# Patient Record
Sex: FEMALE | Race: WHITE | Employment: OTHER | URBAN - NONMETROPOLITAN AREA
[De-identification: names, ages, dates, MRNs, and addresses within clinical notes are randomized per-mention and may not be internally consistent; named-entity substitution may affect disease eponyms.]

---

## 2018-04-10 ENCOUNTER — TELEPHONE (OUTPATIENT)
Dept: NEUROLOGY | Age: 43
End: 2018-04-10

## 2018-04-10 ENCOUNTER — HOSPITAL ENCOUNTER (OUTPATIENT)
Dept: PAIN MANAGEMENT | Age: 43
Discharge: HOME | End: 2018-04-10
Payer: MEDICARE

## 2018-04-10 VITALS
HEART RATE: 94 BPM | WEIGHT: 158 LBS | RESPIRATION RATE: 18 BRPM | HEIGHT: 64 IN | SYSTOLIC BLOOD PRESSURE: 151 MMHG | DIASTOLIC BLOOD PRESSURE: 82 MMHG | OXYGEN SATURATION: 100 % | BODY MASS INDEX: 26.98 KG/M2 | TEMPERATURE: 97.7 F

## 2018-04-10 DIAGNOSIS — M54.6 CHRONIC MIDLINE THORACIC BACK PAIN: Primary | ICD-10-CM

## 2018-04-10 DIAGNOSIS — G89.29 CHRONIC MIDLINE THORACIC BACK PAIN: Primary | ICD-10-CM

## 2018-04-10 DIAGNOSIS — G43.019 INTRACTABLE MIGRAINE WITHOUT AURA AND WITHOUT STATUS MIGRAINOSUS: ICD-10-CM

## 2018-04-10 DIAGNOSIS — M54.40 CHRONIC MIDLINE LOW BACK PAIN WITH SCIATICA, SCIATICA LATERALITY UNSPECIFIED: ICD-10-CM

## 2018-04-10 DIAGNOSIS — G89.29 CHRONIC MIDLINE LOW BACK PAIN WITH SCIATICA, SCIATICA LATERALITY UNSPECIFIED: ICD-10-CM

## 2018-04-10 DIAGNOSIS — G93.5 CHIARI MALFORMATION TYPE I (HCC): ICD-10-CM

## 2018-04-10 PROCEDURE — 99205 OFFICE O/P NEW HI 60 MIN: CPT

## 2018-04-10 RX ORDER — IBUPROFEN 200 MG
800 TABLET ORAL EVERY 8 HOURS PRN
COMMUNITY
End: 2018-05-09 | Stop reason: CLARIF

## 2018-04-10 RX ORDER — BUTALBITAL, ACETAMINOPHEN AND CAFFEINE 50; 325; 40 MG/1; MG/1; MG/1
1 TABLET ORAL EVERY 6 HOURS PRN
Qty: 15 TABLET | Refills: 2 | Status: SHIPPED | OUTPATIENT
Start: 2018-04-10 | End: 2018-09-20 | Stop reason: SDUPTHER

## 2018-04-10 RX ORDER — CELECOXIB 100 MG/1
100 CAPSULE ORAL 2 TIMES DAILY
Qty: 60 CAPSULE | Refills: 2 | Status: SHIPPED | OUTPATIENT
Start: 2018-04-10 | End: 2018-04-11 | Stop reason: CLARIF

## 2018-04-10 RX ORDER — DEXTROAMPHETAMINE SACCHARATE, AMPHETAMINE ASPARTATE, DEXTROAMPHETAMINE SULFATE AND AMPHETAMINE SULFATE 5; 5; 5; 5 MG/1; MG/1; MG/1; MG/1
1 TABLET ORAL 2 TIMES DAILY
Refills: 0 | COMMUNITY
Start: 2018-03-11 | End: 2019-10-28

## 2018-04-10 RX ORDER — BUPROPION HYDROCHLORIDE 300 MG/1
1 TABLET ORAL DAILY
Refills: 2 | COMMUNITY
Start: 2018-03-08 | End: 2021-07-14

## 2018-04-10 RX ORDER — BREXPIPRAZOLE 0.5 MG/1
1 TABLET ORAL DAILY
Refills: 2 | COMMUNITY
Start: 2018-03-08 | End: 2018-08-23

## 2018-04-10 RX ORDER — NORETHINDRONE ACETATE AND ETHINYL ESTRADIOL AND FERROUS FUMARATE 1MG-20(21)
1 KIT ORAL DAILY
Refills: 11 | COMMUNITY
Start: 2018-01-10 | End: 2018-08-23

## 2018-04-10 RX ORDER — LAMOTRIGINE 100 MG/1
200 TABLET ORAL NIGHTLY
Refills: 2 | COMMUNITY
Start: 2018-03-08 | End: 2022-04-28

## 2018-04-10 RX ORDER — TIZANIDINE 4 MG/1
4 TABLET ORAL 2 TIMES DAILY PRN
Qty: 45 TABLET | Refills: 2 | Status: SHIPPED | OUTPATIENT
Start: 2018-04-10 | End: 2018-08-23

## 2018-04-10 ASSESSMENT — ACTIVITIES OF DAILY LIVING (ADL): EFFECT OF PAIN ON DAILY ACTIVITIES: LIMITS ACTIVITIES

## 2018-04-10 ASSESSMENT — PAIN DESCRIPTION - ONSET: ONSET: ON-GOING

## 2018-04-10 ASSESSMENT — PAIN DESCRIPTION - PAIN TYPE: TYPE: CHRONIC PAIN

## 2018-04-10 ASSESSMENT — PAIN DESCRIPTION - DESCRIPTORS: DESCRIPTORS: ACHING;CONSTANT

## 2018-04-10 ASSESSMENT — PAIN SCALES - GENERAL: PAINLEVEL_OUTOF10: 6

## 2018-04-10 ASSESSMENT — PAIN DESCRIPTION - LOCATION: LOCATION: BACK;NECK;HEAD

## 2018-04-10 ASSESSMENT — PAIN DESCRIPTION - FREQUENCY: FREQUENCY: CONTINUOUS

## 2018-04-10 ASSESSMENT — PAIN DESCRIPTION - ORIENTATION: ORIENTATION: LOWER;MID;UPPER

## 2018-04-10 ASSESSMENT — PAIN DESCRIPTION - PROGRESSION: CLINICAL_PROGRESSION: NOT CHANGED

## 2018-04-11 RX ORDER — MELOXICAM 7.5 MG/1
7.5 TABLET ORAL 2 TIMES DAILY
Qty: 60 TABLET | Refills: 2 | Status: SHIPPED | OUTPATIENT
Start: 2018-04-11 | End: 2018-05-09 | Stop reason: CLARIF

## 2018-04-16 ENCOUNTER — OFFICE VISIT (OUTPATIENT)
Dept: NEUROSURGERY | Age: 43
End: 2018-04-16
Payer: MEDICARE

## 2018-04-16 VITALS
WEIGHT: 164 LBS | SYSTOLIC BLOOD PRESSURE: 129 MMHG | BODY MASS INDEX: 28 KG/M2 | DIASTOLIC BLOOD PRESSURE: 88 MMHG | HEART RATE: 88 BPM | HEIGHT: 64 IN

## 2018-04-16 DIAGNOSIS — G43.019 INTRACTABLE MIGRAINE WITHOUT AURA AND WITHOUT STATUS MIGRAINOSUS: Primary | ICD-10-CM

## 2018-04-16 DIAGNOSIS — Z86.69 HISTORY OF CHIARI MALFORMATION: ICD-10-CM

## 2018-04-16 PROCEDURE — 99214 OFFICE O/P EST MOD 30 MIN: CPT | Performed by: NURSE PRACTITIONER

## 2018-04-16 RX ORDER — ZOLPIDEM TARTRATE 10 MG/1
1 TABLET ORAL NIGHTLY
Refills: 2 | COMMUNITY
Start: 2018-04-10

## 2018-04-16 RX ORDER — CLONAZEPAM 0.5 MG/1
1 TABLET ORAL 3 TIMES DAILY PRN
Refills: 1 | COMMUNITY
Start: 2018-04-10 | End: 2018-06-18 | Stop reason: ALTCHOICE

## 2018-05-03 ENCOUNTER — HOSPITAL ENCOUNTER (OUTPATIENT)
Dept: GENERAL RADIOLOGY | Age: 43
Discharge: HOME | End: 2018-05-03
Payer: MEDICARE

## 2018-05-03 ENCOUNTER — HOSPITAL ENCOUNTER (OUTPATIENT)
Dept: MRI IMAGING | Age: 43
Discharge: HOME | End: 2018-05-03
Payer: MEDICARE

## 2018-05-03 DIAGNOSIS — M54.40 CHRONIC MIDLINE LOW BACK PAIN WITH SCIATICA, SCIATICA LATERALITY UNSPECIFIED: ICD-10-CM

## 2018-05-03 DIAGNOSIS — G89.29 CHRONIC MIDLINE LOW BACK PAIN WITH SCIATICA, SCIATICA LATERALITY UNSPECIFIED: ICD-10-CM

## 2018-05-03 DIAGNOSIS — G89.29 CHRONIC MIDLINE THORACIC BACK PAIN: ICD-10-CM

## 2018-05-03 DIAGNOSIS — G43.019 INTRACTABLE MIGRAINE WITHOUT AURA AND WITHOUT STATUS MIGRAINOSUS: ICD-10-CM

## 2018-05-03 DIAGNOSIS — M54.6 CHRONIC MIDLINE THORACIC BACK PAIN: ICD-10-CM

## 2018-05-03 DIAGNOSIS — G93.5 CHIARI MALFORMATION TYPE I (HCC): ICD-10-CM

## 2018-05-03 PROCEDURE — 72040 X-RAY EXAM NECK SPINE 2-3 VW: CPT

## 2018-05-03 PROCEDURE — A9577 INJ MULTIHANCE: HCPCS | Performed by: NURSE PRACTITIONER

## 2018-05-03 PROCEDURE — 70553 MRI BRAIN STEM W/O & W/DYE: CPT

## 2018-05-03 PROCEDURE — 72072 X-RAY EXAM THORAC SPINE 3VWS: CPT

## 2018-05-03 PROCEDURE — 72120 X-RAY BEND ONLY L-S SPINE: CPT

## 2018-05-03 PROCEDURE — 6360000004 HC RX CONTRAST MEDICATION: Performed by: NURSE PRACTITIONER

## 2018-05-03 RX ADMIN — GADOBENATE DIMEGLUMINE 15 ML: 529 INJECTION, SOLUTION INTRAVENOUS at 08:54

## 2018-05-09 ENCOUNTER — HOSPITAL ENCOUNTER (OUTPATIENT)
Dept: PAIN MANAGEMENT | Age: 43
Discharge: HOME | End: 2018-05-09
Payer: MEDICARE

## 2018-05-09 VITALS
RESPIRATION RATE: 18 BRPM | TEMPERATURE: 98.5 F | OXYGEN SATURATION: 100 % | BODY MASS INDEX: 28 KG/M2 | WEIGHT: 164 LBS | DIASTOLIC BLOOD PRESSURE: 82 MMHG | SYSTOLIC BLOOD PRESSURE: 116 MMHG | HEART RATE: 85 BPM | HEIGHT: 64 IN

## 2018-05-09 DIAGNOSIS — M79.18 MYOFASCIAL PAIN: Chronic | ICD-10-CM

## 2018-05-09 PROCEDURE — 20553 NJX 1/MLT TRIGGER POINTS 3/>: CPT

## 2018-05-09 PROCEDURE — 80307 DRUG TEST PRSMV CHEM ANLYZR: CPT

## 2018-05-09 PROCEDURE — 6360000002 HC RX W HCPCS

## 2018-05-09 PROCEDURE — 2500000003 HC RX 250 WO HCPCS

## 2018-05-09 RX ORDER — TRIAMCINOLONE ACETONIDE 40 MG/ML
INJECTION, SUSPENSION INTRA-ARTICULAR; INTRAMUSCULAR
Status: COMPLETED | OUTPATIENT
Start: 2018-05-09 | End: 2018-05-09

## 2018-05-09 RX ORDER — DICLOFENAC SODIUM 75 MG/1
75 TABLET, DELAYED RELEASE ORAL 2 TIMES DAILY
Qty: 60 TABLET | Refills: 3 | Status: SHIPPED | OUTPATIENT
Start: 2018-05-09 | End: 2018-07-26 | Stop reason: ALTCHOICE

## 2018-05-09 RX ORDER — BUPIVACAINE HYDROCHLORIDE 5 MG/ML
INJECTION, SOLUTION EPIDURAL; INTRACAUDAL
Status: COMPLETED | OUTPATIENT
Start: 2018-05-09 | End: 2018-05-09

## 2018-05-09 RX ADMIN — TRIAMCINOLONE ACETONIDE 20 MG: 40 INJECTION, SUSPENSION INTRA-ARTICULAR; INTRAMUSCULAR at 16:26

## 2018-05-09 RX ADMIN — BUPIVACAINE HYDROCHLORIDE 19 ML: 5 INJECTION, SOLUTION EPIDURAL; INTRACAUDAL at 16:25

## 2018-05-09 ASSESSMENT — PAIN DESCRIPTION - LOCATION: LOCATION: BACK;HEAD;NECK

## 2018-05-09 ASSESSMENT — PAIN DESCRIPTION - ORIENTATION: ORIENTATION: LOWER;MID;UPPER

## 2018-05-09 ASSESSMENT — ACTIVITIES OF DAILY LIVING (ADL): EFFECT OF PAIN ON DAILY ACTIVITIES: DAILY CHORES AND ACTIVITIES

## 2018-05-09 ASSESSMENT — PAIN DESCRIPTION - ONSET: ONSET: ON-GOING

## 2018-05-09 ASSESSMENT — PAIN DESCRIPTION - DESCRIPTORS: DESCRIPTORS: ACHING;CONSTANT

## 2018-05-09 ASSESSMENT — PAIN DESCRIPTION - PROGRESSION: CLINICAL_PROGRESSION: NOT CHANGED

## 2018-05-09 ASSESSMENT — PAIN DESCRIPTION - PAIN TYPE: TYPE: CHRONIC PAIN

## 2018-05-09 ASSESSMENT — PAIN DESCRIPTION - FREQUENCY: FREQUENCY: CONTINUOUS

## 2018-05-14 LAB
AMOBARBITAL, URINE: NEGATIVE
AMPHETAMINES, UR GC/MS CONF: >4000 NG/ML
AMPHETAMINES, URINE: ABNORMAL NG/ML
AMPHETAMINES, URINE: POSITIVE
AMPHETAMINES, URINE: POSITIVE
BARBITURATES, URINE: ABNORMAL NG/ML
BARBITURATES, URINE: POSITIVE
BENZODIAZEPINES, URINE: NEGATIVE NG/ML
BUTALBITAL, GC/MS URINE: 705 NG/ML
BUTALBITAL, URINE: POSITIVE
CANNABINOIDS, URINE: NEGATIVE NG/ML
COCAINE METABOLITE, URINE: NEGATIVE NG/ML
CREATININE, URINE: 238.9 MG/DL (ref 20–300)
ETHANOL U, QUAN: NEGATIVE %
FENTANYL URINE: NEGATIVE PG/ML
MEPERIDINE, UR: NEGATIVE NG/ML
METHADONE SCREEN, URINE: NEGATIVE NG/ML
METHAMPHETAMINE, URINE: NEGATIVE
OPIATES, URINE: NEGATIVE NG/ML
OXYCODONE/OXYMORPHONE, UR: NEGATIVE NG/ML
PENTOBARBITAL, UR: NEGATIVE
PH, URINE: 5.3 (ref 4.5–8.9)
PHENCYCLIDINE, URINE: NEGATIVE NG/ML
PHENOBARBITAL, UR: NEGATIVE
PROPOXYPHENE, URINE: NEGATIVE NG/ML
SECOBARBITAL, UR: NEGATIVE

## 2018-06-18 ENCOUNTER — OFFICE VISIT (OUTPATIENT)
Dept: NEUROSURGERY | Age: 43
End: 2018-06-18
Payer: MEDICARE

## 2018-06-18 VITALS
SYSTOLIC BLOOD PRESSURE: 124 MMHG | DIASTOLIC BLOOD PRESSURE: 83 MMHG | HEIGHT: 64 IN | BODY MASS INDEX: 27.49 KG/M2 | HEART RATE: 88 BPM | WEIGHT: 161 LBS

## 2018-06-18 DIAGNOSIS — R51.9 NONINTRACTABLE HEADACHE, UNSPECIFIED CHRONICITY PATTERN, UNSPECIFIED HEADACHE TYPE: Primary | ICD-10-CM

## 2018-06-18 PROCEDURE — 99213 OFFICE O/P EST LOW 20 MIN: CPT | Performed by: NURSE PRACTITIONER

## 2018-06-18 RX ORDER — ALPRAZOLAM 0.5 MG/1
1 TABLET ORAL 2 TIMES DAILY PRN
Refills: 2 | COMMUNITY
Start: 2018-06-09 | End: 2021-03-01

## 2018-07-16 ENCOUNTER — HOSPITAL ENCOUNTER (OUTPATIENT)
Dept: PAIN MANAGEMENT | Age: 43
Discharge: HOME | End: 2018-07-16
Payer: MEDICARE

## 2018-07-16 VITALS
WEIGHT: 161 LBS | HEIGHT: 64 IN | SYSTOLIC BLOOD PRESSURE: 138 MMHG | OXYGEN SATURATION: 100 % | BODY MASS INDEX: 27.49 KG/M2 | HEART RATE: 90 BPM | TEMPERATURE: 97.1 F | RESPIRATION RATE: 18 BRPM | DIASTOLIC BLOOD PRESSURE: 85 MMHG

## 2018-07-16 DIAGNOSIS — G43.019 INTRACTABLE MIGRAINE WITHOUT AURA AND WITHOUT STATUS MIGRAINOSUS: Primary | ICD-10-CM

## 2018-07-16 DIAGNOSIS — M79.18 MYOFASCIAL PAIN: ICD-10-CM

## 2018-07-16 PROCEDURE — 99213 OFFICE O/P EST LOW 20 MIN: CPT | Performed by: NURSE PRACTITIONER

## 2018-07-16 PROCEDURE — 99213 OFFICE O/P EST LOW 20 MIN: CPT

## 2018-07-16 RX ORDER — BUTALBITAL, ACETAMINOPHEN AND CAFFEINE 50; 325; 40 MG/1; MG/1; MG/1
1 TABLET ORAL 2 TIMES DAILY PRN
Qty: 30 TABLET | Refills: 0 | Status: SHIPPED | OUTPATIENT
Start: 2018-07-16 | End: 2018-08-28 | Stop reason: SDUPTHER

## 2018-07-16 RX ORDER — IBUPROFEN AND FAMOTIDINE 26.6; 8 MG/1; MG/1
1 TABLET, FILM COATED ORAL 3 TIMES DAILY
Qty: 90 TABLET | Refills: 1 | Status: SHIPPED | OUTPATIENT
Start: 2018-07-16 | End: 2018-07-26 | Stop reason: ALTCHOICE

## 2018-07-16 ASSESSMENT — PAIN DESCRIPTION - LOCATION: LOCATION: BACK;NECK;HEAD

## 2018-07-16 ASSESSMENT — PAIN DESCRIPTION - DESCRIPTORS: DESCRIPTORS: CONSTANT;PRESSURE

## 2018-07-16 ASSESSMENT — PAIN SCALES - GENERAL: PAINLEVEL_OUTOF10: 5

## 2018-07-16 ASSESSMENT — PAIN DESCRIPTION - ONSET: ONSET: ON-GOING

## 2018-07-16 ASSESSMENT — PAIN DESCRIPTION - PAIN TYPE: TYPE: CHRONIC PAIN

## 2018-07-16 ASSESSMENT — PAIN DESCRIPTION - PROGRESSION: CLINICAL_PROGRESSION: NOT CHANGED

## 2018-07-16 ASSESSMENT — PAIN DESCRIPTION - FREQUENCY: FREQUENCY: CONTINUOUS

## 2018-07-16 ASSESSMENT — ACTIVITIES OF DAILY LIVING (ADL): EFFECT OF PAIN ON DAILY ACTIVITIES: LIMITS ACTIVITIES

## 2018-07-16 ASSESSMENT — PAIN DESCRIPTION - ORIENTATION: ORIENTATION: LOWER;MID;UPPER

## 2018-07-16 ASSESSMENT — ENCOUNTER SYMPTOMS
BACK PAIN: 1
CONSTIPATION: 0

## 2018-07-26 ENCOUNTER — HOSPITAL ENCOUNTER (OUTPATIENT)
Dept: PAIN MANAGEMENT | Age: 43
Discharge: HOME | End: 2018-07-26
Payer: MEDICARE

## 2018-07-26 DIAGNOSIS — M79.18 MYOFACIAL MUSCLE PAIN: Primary | ICD-10-CM

## 2018-07-26 DIAGNOSIS — G43.709 CHRONIC MIGRAINE WITHOUT AURA WITHOUT STATUS MIGRAINOSUS, NOT INTRACTABLE: ICD-10-CM

## 2018-07-26 PROCEDURE — 20553 NJX 1/MLT TRIGGER POINTS 3/>: CPT | Performed by: NURSE PRACTITIONER

## 2018-07-26 PROCEDURE — 6360000002 HC RX W HCPCS

## 2018-07-26 PROCEDURE — 2500000003 HC RX 250 WO HCPCS

## 2018-07-26 PROCEDURE — 20553 NJX 1/MLT TRIGGER POINTS 3/>: CPT

## 2018-07-26 RX ORDER — KETOROLAC TROMETHAMINE 30 MG/ML
22.5 INJECTION, SOLUTION INTRAMUSCULAR; INTRAVENOUS ONCE
Status: COMPLETED | OUTPATIENT
Start: 2018-07-26 | End: 2018-07-26

## 2018-07-26 RX ORDER — BUPIVACAINE HYDROCHLORIDE 5 MG/ML
INJECTION, SOLUTION EPIDURAL; INTRACAUDAL
Status: COMPLETED | OUTPATIENT
Start: 2018-07-26 | End: 2018-07-26

## 2018-07-26 RX ORDER — LIDOCAINE HYDROCHLORIDE 10 MG/ML
INJECTION, SOLUTION EPIDURAL; INFILTRATION; INTRACAUDAL; PERINEURAL
Status: COMPLETED | OUTPATIENT
Start: 2018-07-26 | End: 2018-07-26

## 2018-07-26 RX ADMIN — BUPIVACAINE HYDROCHLORIDE 15 ML: 5 INJECTION, SOLUTION EPIDURAL; INTRACAUDAL at 16:15

## 2018-07-26 RX ADMIN — KETOROLAC TROMETHAMINE 22.5 MG: 30 INJECTION, SOLUTION INTRAMUSCULAR; INTRAVENOUS at 16:44

## 2018-07-26 RX ADMIN — LIDOCAINE HYDROCHLORIDE 15 ML: 10 INJECTION, SOLUTION EPIDURAL; INFILTRATION; INTRACAUDAL; PERINEURAL at 16:15

## 2018-07-26 ASSESSMENT — PAIN SCALES - GENERAL: PAINLEVEL_OUTOF10: 5

## 2018-07-26 NOTE — PROGRESS NOTES
Nursing Admission Record    Current Issues / Falls / ER Visits:  No    Percentage of Pain Relief after Last Procedure:  75 %    How long lasted: still helping    Radiology exams received during the last 12 months: No    MRI exams received in the past 2 years:  No  Physical therapy during the last 6 months: no    Labs during the last 12 months: No    Education Provided:  [] Review of [place]  [] Agreement Review  [] Compliance Issues Discussed    [] Cognitive Behavior Needs [x] Exercise [] Review of Test [] Financial Issues  [] Tobacco/Alcohol Use [x] Teaching [] New Patient [] Picture Obtained    Physician Plan:  [] Outgoing Referral  [] Pharmacy Consult  [] Test Ordered   [] Obtained Test Results / Consult Notes  [] UDS due at next visit, verified per EPIC      [] Suspected Physical Abuse or Suicide Risk assessed - IF YES COMPLETE QUESTIONS BELOW    If any of the following questions are answered yes - contact attending physician for referral:    Has been considering harming self to escape stress, pain problems? [] YES  [x] NO  Has a suicide plan? [] YES  [x] NO  Has attempted suicide in the past?   [] YES  [x] NO  Has a close friend or family member who committed suicide? [] YES  [x] NO    Patient Referred To :      Additional Notes:    Assessment Completed by:  Electronically signed by Sveta Gabriel RN on 6/43/9264 at 3:56 PM

## 2018-07-26 NOTE — PROCEDURES
[facility] Physical & Pain Medicine    Patient Name: Theo Burris    : 1975                    Age: 39 y.o. Sex: female    Date: 2018    Pre-op Diagnosis: Myofascial Pain/ Muscle Spasms/ Cervicalgia    Post-op Diagnosis: Myofascial Pain/ Muscle Spasms/ Cervicalgia    Procedure: Cervical Trigger Point Injections    Performing Procedure: SANDRO Nick - BC, VA-BC    Previously Had Procedure:  [x] Yes   [] No  What Percentage Did It Help: > 80%  How Long Was Relief Obtained: several weeks Weeks    No data found. Description of Procedure:    After a brief physical assessment and failure to improve with conservative measures the patient presented for Cervical Trigger Point Injections The indications, limitations and possible complications were discussed with the patient and the patient elected to proceed with the procedure. After voluntary, informed and signed consent obtained the patient was placed in a seated position. Appropriate time out was obtained per policy. The area of maximal tenderness was palpated over the  [] Right  [] Left  [x] Bilateral Cervical  [x] Splenius  [x] Trapezius  [x] Rhomboid. The skin overlying these areas was marked with a skin marker. The skin overlying the proposed injection sites were then sprayed with Gebauer's Solution while protecting patient eyes. The areas were then prepped in a sterile fashion with Chloro-Prep. Each trigger point of the  [] Right   [] Left  [x] Bilateral Cervical  [x] Splenius  [] Trapezius  [] Rhomboid was injected with approximately 1 - 2 ml of a solution of 5 ml of 1% Lidocaine Plain and 5 ml of 0.5% Marcaine Plain    [] with Decadron 0.5 ml (10mg/ml)   [x] with Toradol 0.5 ml (15mg/ml)  (approximately 20 ml total) using a 25 gauge 1  inch needle. Sterile dressings applied. Discharge: The patient tolerated the procedure well.  There were no complications during the procedure and the

## 2018-08-23 ENCOUNTER — HOSPITAL ENCOUNTER (OUTPATIENT)
Dept: PAIN MANAGEMENT | Age: 43
Discharge: HOME | End: 2018-08-23
Payer: MEDICARE

## 2018-08-23 VITALS
OXYGEN SATURATION: 98 % | RESPIRATION RATE: 18 BRPM | HEIGHT: 63 IN | TEMPERATURE: 98.9 F | SYSTOLIC BLOOD PRESSURE: 127 MMHG | WEIGHT: 165 LBS | BODY MASS INDEX: 29.23 KG/M2 | HEART RATE: 86 BPM | DIASTOLIC BLOOD PRESSURE: 87 MMHG

## 2018-08-23 DIAGNOSIS — M79.18 MYOFASCIAL PAIN: ICD-10-CM

## 2018-08-23 PROCEDURE — 64405 NJX AA&/STRD GR OCPL NRV: CPT

## 2018-08-23 PROCEDURE — 64405 NJX AA&/STRD GR OCPL NRV: CPT | Performed by: NURSE PRACTITIONER

## 2018-08-23 PROCEDURE — 2500000003 HC RX 250 WO HCPCS

## 2018-08-23 PROCEDURE — 20553 NJX 1/MLT TRIGGER POINTS 3/>: CPT

## 2018-08-23 PROCEDURE — 20553 NJX 1/MLT TRIGGER POINTS 3/>: CPT | Performed by: NURSE PRACTITIONER

## 2018-08-23 PROCEDURE — 6360000002 HC RX W HCPCS

## 2018-08-23 RX ORDER — LIDOCAINE HYDROCHLORIDE 10 MG/ML
INJECTION, SOLUTION EPIDURAL; INFILTRATION; INTRACAUDAL; PERINEURAL
Status: COMPLETED | OUTPATIENT
Start: 2018-08-23 | End: 2018-08-23

## 2018-08-23 RX ORDER — IBUPROFEN AND FAMOTIDINE 26.6; 8 MG/1; MG/1
1 TABLET, FILM COATED ORAL 3 TIMES DAILY PRN
Qty: 90 TABLET | Refills: 5 | Status: SHIPPED | OUTPATIENT
Start: 2018-08-23 | End: 2018-08-28 | Stop reason: SDUPTHER

## 2018-08-23 RX ORDER — TIZANIDINE 4 MG/1
TABLET ORAL
Qty: 60 TABLET | Refills: 2 | Status: SHIPPED | OUTPATIENT
Start: 2018-08-23 | End: 2018-12-13 | Stop reason: SDUPTHER

## 2018-08-23 RX ORDER — DEXAMETHASONE SODIUM PHOSPHATE 10 MG/ML
10 INJECTION INTRAMUSCULAR; INTRAVENOUS ONCE
Status: COMPLETED | OUTPATIENT
Start: 2018-08-23 | End: 2018-08-23

## 2018-08-23 RX ORDER — BUPIVACAINE HYDROCHLORIDE 5 MG/ML
INJECTION, SOLUTION EPIDURAL; INTRACAUDAL
Status: COMPLETED | OUTPATIENT
Start: 2018-08-23 | End: 2018-08-23

## 2018-08-23 RX ORDER — KETOROLAC TROMETHAMINE 30 MG/ML
30 INJECTION, SOLUTION INTRAMUSCULAR; INTRAVENOUS ONCE
Status: COMPLETED | OUTPATIENT
Start: 2018-08-23 | End: 2018-08-23

## 2018-08-23 RX ADMIN — LIDOCAINE HYDROCHLORIDE 16 ML: 10 INJECTION, SOLUTION EPIDURAL; INFILTRATION; INTRACAUDAL; PERINEURAL at 15:44

## 2018-08-23 RX ADMIN — BUPIVACAINE HYDROCHLORIDE 10 ML: 5 INJECTION, SOLUTION EPIDURAL; INTRACAUDAL at 15:44

## 2018-08-23 RX ADMIN — KETOROLAC TROMETHAMINE 30 MG: 30 INJECTION, SOLUTION INTRAMUSCULAR; INTRAVENOUS at 15:49

## 2018-08-23 RX ADMIN — DEXAMETHASONE SODIUM PHOSPHATE 10 MG: 10 INJECTION INTRAMUSCULAR; INTRAVENOUS at 15:48

## 2018-08-23 ASSESSMENT — PAIN - FUNCTIONAL ASSESSMENT: PAIN_FUNCTIONAL_ASSESSMENT: 0-10

## 2018-08-23 ASSESSMENT — PAIN SCALES - GENERAL: PAINLEVEL_OUTOF10: 6

## 2018-08-23 NOTE — PROGRESS NOTES
Procedure:  Level of Consciousness: [x]Alert [x]Oriented []Disoriented []Lethargic  Anxiety Level: [x]Calm []Anxious []Depressed []Other  Skin: [x]Warm [x]Dry []Cool []Moist []Intact []Other  Cardiovascular: []Palpitations: [x]Never []Occasionally []Frequently  Chest Pain: [x]No []Yes  Respiratory:  [x]Unlabored []Labored []Cough ([] Productive []Unproductive)  HCG Required: [x]No []Yes   Results: []Negative []Positive  Knowledge Level:        [x]Patient/Other verbalized understanding of pre-procedure instructions. [x]Assessment of post-op care needs (transportation, responsible caregiver)        [x]Able to discuss health care problems and how to deal with it.   Factors that Affect Teaching:        Language Barrier: [x]No []Yes - why:        Hearing Loss:        [x]No []Yes            Corrective Device:  []Yes []No        Vision Loss:           [x]No []Yes            Corrective Device:  []Yes []No        Memory Loss:       [x]No []Yes            []Short Term []Long Term  Motivational Level:  [x]Asks Questions                  []Extremely Anxious       [x]Seems Interested               []Seems Uninterested                  []Denies need for Education  Risk for Injury:  [x]Patient oriented to person, place and time  []History of frequent falls/loss of balance  Nutritional:  []Change in appetite   []Weight Gain   []Weight Loss  Functional:  []Requires assistance with ADL's

## 2018-08-23 NOTE — PROCEDURES
procedure well. There were no complications during the procedure and the patient was discharged home with discharge instructions. The patient has been instructed to contact the office should there be any complications or questions to arise between today and their next appointment. AMNA Jane CNP, 2018 at 4:04 PM      [facility] Physical & Pain Medicine    Patient Name: Lashawn Mcelroy    : 1975                    Age: 39 y.o. Sex: female    Date: 2018    Pre-op Diagnosis: Myofascial Pain/ Muscle Spasms    Post-op Diagnosis: Myofascial Pain/ Muscle Spasms    Procedure: Lumbar Trigger Point Injections    Performing Procedure: SANDRO Sow; KOTA    Previously Had Procedure:  [x] Yes   [] No      Patient Vitals for the past 24 hrs:   BP Temp Temp src Pulse Resp SpO2 Height Weight   18 1524 127/87 98.9 °F (37.2 °C) Oral 86 18 98 % 5' 3\" (1.6 m) 165 lb (74.8 kg)       Description of Procedure:    After a brief physical assessment and failure to improve with conservative measures the patient presented for Lumbar Trigger Point Injections The indications, limitations and possible complications were discussed with the patient and the patient elected to proceed with the procedure. After voluntary, informed and signed consent obtained the patient was placed in a  [] Sitting  [x] Prone position     Appropriate time out was obtained per policy. The area of maximal tenderness was palpated over the  [] Right  [] Left  [x] Bilateral Lower  [x] Latissimus  [x] Erector Spinae  [x] Lumbar Paraspinous. The skin overlying these areas was marked with a skin marker. The skin overlying the proposed injection sites were then sprayed with Gebauer's Solution and prepped in a sterile fashion with Chloro-Prep.  Each trigger point of the  [] Right  [] Left  [x] Bilateral Lower  [x] Latissimus   [x] Erector Spinae  [x] Lumbar Paraspinous was then injected after negative aspiration was injected with approximately 2 ml of a solution of 5 ml of 1% Lidocaine Plain and 5 ml of 0.5% Marcaine Plain    [] with Decadron 0.5 ml (10mg/ml)  [] with Toradol 0.5 ml (15mg/ml)  (approximately 20 ml total) using a 25 gauge 1 1/2 inch needle. Sterile dressings applied. Discharge: The patient tolerated the procedure well. There were no complications during the procedure and the patient was discharged home with discharge instructions. The patient has been instructed to contact the office should there be any complications or questions to arise between today and their next appointment. Patient was given samples of Duexis and this medication helped with myofacial pain and pain caused by chiari malformation. Patient tried to take plan Ibuprofen and she started having GI upset and she is not getting the same effect from [facility]. Duexis has pepcid and ibuprofen. The composition of the medication decrease GI side effects and increasing the uptake of the Ibuprofen. Clinical studies have shown that taking pepcid separate from the Ibuprofen is not effective and same results do not occur.      Plan:  [x] Will return to the office in   [] 1 month  [x] 4 - 6 weeks   [] 2 months   [] 3 months for:  [] Planned Procedure  [x] Procedure Follow-up   [] Office Visit      AMNA Salcedo CNP, 8/23/2018 at 4:05 PM

## 2018-08-24 ENCOUNTER — TELEPHONE (OUTPATIENT)
Dept: PAIN MANAGEMENT | Age: 43
End: 2018-08-24

## 2018-08-28 DIAGNOSIS — G43.019 INTRACTABLE MIGRAINE WITHOUT AURA AND WITHOUT STATUS MIGRAINOSUS: ICD-10-CM

## 2018-08-28 RX ORDER — IBUPROFEN AND FAMOTIDINE 26.6; 8 MG/1; MG/1
1 TABLET, FILM COATED ORAL 3 TIMES DAILY PRN
Qty: 90 TABLET | Refills: 5 | Status: SHIPPED | OUTPATIENT
Start: 2018-08-28 | End: 2018-08-30 | Stop reason: CLARIF

## 2018-08-28 RX ORDER — BUTALBITAL, ACETAMINOPHEN AND CAFFEINE 50; 325; 40 MG/1; MG/1; MG/1
1 TABLET ORAL 2 TIMES DAILY PRN
Qty: 30 TABLET | Refills: 0 | Status: SHIPPED | OUTPATIENT
Start: 2018-08-28 | End: 2018-11-14 | Stop reason: SDUPTHER

## 2018-08-30 RX ORDER — IBUPROFEN 800 MG/1
800 TABLET ORAL 3 TIMES DAILY PRN
Qty: 90 TABLET | Refills: 5 | Status: SHIPPED | OUTPATIENT
Start: 2018-08-30 | End: 2019-05-02 | Stop reason: SDUPTHER

## 2018-09-20 ENCOUNTER — OFFICE VISIT (OUTPATIENT)
Dept: NEUROSURGERY | Age: 43
End: 2018-09-20
Payer: MEDICARE

## 2018-09-20 VITALS
WEIGHT: 165 LBS | DIASTOLIC BLOOD PRESSURE: 84 MMHG | SYSTOLIC BLOOD PRESSURE: 127 MMHG | HEART RATE: 88 BPM | BODY MASS INDEX: 28.17 KG/M2 | HEIGHT: 64 IN

## 2018-09-20 DIAGNOSIS — G43.019 INTRACTABLE MIGRAINE WITHOUT AURA AND WITHOUT STATUS MIGRAINOSUS: Primary | ICD-10-CM

## 2018-09-20 PROCEDURE — G8427 DOCREV CUR MEDS BY ELIG CLIN: HCPCS | Performed by: NURSE PRACTITIONER

## 2018-09-20 PROCEDURE — G8419 CALC BMI OUT NRM PARAM NOF/U: HCPCS | Performed by: NURSE PRACTITIONER

## 2018-09-20 PROCEDURE — 1036F TOBACCO NON-USER: CPT | Performed by: NURSE PRACTITIONER

## 2018-09-20 PROCEDURE — 99213 OFFICE O/P EST LOW 20 MIN: CPT | Performed by: NURSE PRACTITIONER

## 2018-09-20 NOTE — PROGRESS NOTES
REVIEW OF SYSTEMS    Constitutional: []Fever [x]Sweat []Chills [] Recent Injury [x] Denies all unless marked  HEENT:[x]Headache  [] Head Injury/Hearing Loss  [] Sore Throat  [] Ear Ache/Dizziness  [x] Denies all unless marked  Spine:  [x] Neck pain  [x] Back pain  [x] Sciaticia  [x] Denies all unless marked  Cardiovascular:[]Heart Disease []Chest Pain [x] Palpitations  [x] Denies all unless marked  Pulmonary: [x]Shortness of Breath []Cough   [x] Denies all unless marke  Gastrointestinal: []Nausea  []Vomiting  []Abdominal Pain  [x]Constipation  []Diarrhea  []Dark Bloody Stools  [x] Denies all unless marked  Psychiatric/Behavioral:[] Depression [x] Anxiety [x] Denies all unless marked  Genitourinary:   [] Frequency  [] Urgency  [] Incontinence [] Pain with Urination  [x] Denies all unless marked  Extremities: [x]Pain  []Swelling  [x] Denies all unless marked  Musculoskeletal: [x] Muscle Pain  [x] Joint Pain  [] Arthritis [] Muscle Cramps [] Muscle Twitches  [x] Denies all unless marked  Sleep: [x] Insomnia [] Snoring [] Restless Legs [] Sleep Apnea  [x] Daytime Sleepiness  [x] Denies all unless marked  Skin:[] Rash [] Skin Discoloration [x] Denies all unless marked   Neurological: [x]Visual Disturbance/Memory Loss [x] Loss of Balance [] Slurred Speech/Weakness [] Seizures  [] Vertigo/Dizziness [x] Denies all unless marked
[x]Affect attention and concentration appear appropriate  [x]Recent and remote memory appears unremarkable  [x]Speech normal without dysarthria or aphasia, comprehension and repetition intact. COMMENTS:    Cranial Nerves [x]No VF deficit to confrontation,  no papilledema on fundoscopic exam.  [x]PERRLA, EOMI, no nystagmus, conjugate eye movements, no ptosis  [x]Face symmetric  [x]Facial sensation intact  [x]Tongue midline no atrophy or fasciculations present  [x]Palate midline, hearing to finger rub normal bilaterally  [x]Shoulder shrug and SCM testing normal bilaterally  COMMENTS:   Motor   [x]5/5 strength x 4 extremities  [x]Normal bulk and tone  [x]No tremor present  [x]No rigidity or bradykinesia noted  COMMENTS:   Sensory  [x]Sensation intact to light touch, pin prick, vibration, and proprioception BLE  [x]Sensation intact to light touch, pin prick, vibration, and proprioception BUE  COMMENTS:   Coordination [x]FTN normal bilaterally   [x]HTS normal bilaterally  [x]HAN normal bilaterally.    COMMENTS:   Reflexes  [x]Symmetric and non-pathological  [x]Toes down going bilaterally  [x]No clonus present  COMMENTS:   Gait                  [x]Normal steady gait    []Ataxic    []Spastic     []Magnetic     []Shuffling  COMMENTS:       LABS RECORD AND IMAGING REVIEW (As below and per HPI)    No results found for: [id number]  Lab Results   Component Value Date    WBC 6.87 08/27/2011    HGB 12.6 08/27/2011    HCT 38.0 08/27/2011    MCV 91.1 08/27/2011     08/27/2011     Lab Results   Component Value Date     08/27/2011    K 4.0 08/27/2011     08/27/2011    CO2 32 (H) 08/27/2011    BUN 10 08/27/2011    CREATININE 0.8 08/27/2011    GLUCOSE 94 08/27/2011    CALCIUM 9.3 08/27/2011    PROT 7.4 08/27/2011    LABALBU 4.6 08/27/2011    ALKPHOS 68 08/27/2011    AST 30 08/27/2011    ALT 35 08/27/2011    LABGLOM 86 08/27/2011     MRI brain (9/2016)- mild susceptibility artifact associated with the postoperative

## 2018-10-03 ENCOUNTER — HOSPITAL ENCOUNTER (OUTPATIENT)
Dept: PAIN MANAGEMENT | Age: 43
Discharge: HOME | End: 2018-10-03
Payer: MEDICARE

## 2018-10-03 VITALS
WEIGHT: 165 LBS | DIASTOLIC BLOOD PRESSURE: 79 MMHG | HEART RATE: 90 BPM | SYSTOLIC BLOOD PRESSURE: 121 MMHG | TEMPERATURE: 98.3 F | OXYGEN SATURATION: 100 % | RESPIRATION RATE: 18 BRPM | BODY MASS INDEX: 28.17 KG/M2 | HEIGHT: 64 IN

## 2018-10-03 DIAGNOSIS — M79.18 MYOFASCIAL PAIN: ICD-10-CM

## 2018-10-03 DIAGNOSIS — G44.229 CHRONIC TENSION-TYPE HEADACHE, NOT INTRACTABLE: Primary | ICD-10-CM

## 2018-10-03 PROCEDURE — 99213 OFFICE O/P EST LOW 20 MIN: CPT | Performed by: NURSE PRACTITIONER

## 2018-10-03 PROCEDURE — 99214 OFFICE O/P EST MOD 30 MIN: CPT

## 2018-10-03 RX ORDER — BUTALBITAL, ACETAMINOPHEN AND CAFFEINE 50; 325; 40 MG/1; MG/1; MG/1
1 TABLET ORAL 2 TIMES DAILY PRN
Qty: 30 TABLET | Refills: 0 | Status: SHIPPED | OUTPATIENT
Start: 2018-10-03 | End: 2018-11-14 | Stop reason: SDUPTHER

## 2018-10-03 ASSESSMENT — PAIN DESCRIPTION - DESCRIPTORS: DESCRIPTORS: ACHING;CONSTANT;TIGHTNESS

## 2018-10-03 ASSESSMENT — PAIN DESCRIPTION - DURATION: DURATION_2: CONTINUOUS

## 2018-10-03 ASSESSMENT — PAIN DESCRIPTION - PAIN TYPE
TYPE_2: CHRONIC PAIN
TYPE: CHRONIC PAIN

## 2018-10-03 ASSESSMENT — PAIN SCALES - GENERAL: PAINLEVEL_OUTOF10: 5

## 2018-10-03 ASSESSMENT — ENCOUNTER SYMPTOMS
BACK PAIN: 1
CONSTIPATION: 0

## 2018-10-03 ASSESSMENT — PAIN DESCRIPTION - INTENSITY: RATING_2: 7

## 2018-10-03 ASSESSMENT — PAIN DESCRIPTION - ONSET
ONSET_2: ON-GOING
ONSET: ON-GOING

## 2018-10-03 ASSESSMENT — PAIN DESCRIPTION - DIRECTION
RADIATING_TOWARDS_2: CHRONIC HEADACHES
RADIATING_TOWARDS: LOWER BACK PAIN

## 2018-10-03 ASSESSMENT — PAIN DESCRIPTION - LOCATION
LOCATION: BACK
LOCATION_2: HEAD

## 2018-10-03 ASSESSMENT — PAIN DESCRIPTION - PROGRESSION
CLINICAL_PROGRESSION_2: NOT CHANGED
CLINICAL_PROGRESSION: NOT CHANGED

## 2018-10-03 ASSESSMENT — PAIN DESCRIPTION - ORIENTATION
ORIENTATION: LOWER;MID
ORIENTATION_2: UPPER

## 2018-10-03 ASSESSMENT — ACTIVITIES OF DAILY LIVING (ADL): EFFECT OF PAIN ON DAILY ACTIVITIES: LIMITS ACTIVITIES

## 2018-10-03 ASSESSMENT — PAIN DESCRIPTION - FREQUENCY: FREQUENCY: CONTINUOUS

## 2018-10-03 NOTE — PROGRESS NOTES
Nursing Admission Record    Current Issues / Falls / ER Visits:  No    Percentage of Pain Relief after Last Procedure Trigger points:  70 %    How long lasted:  1.5 months  Percentage of Pain Relief after Last Procedure ONB:  50%    How long lasted:  1 month    Radiology exams received during the last 12 months: Yes Lumbar, Thoracic and Cervical xrays       When May 2018                                              Where [facility]       Imaging on chart: Yes         Imaging records requested: No  MRI exams received in the past 2 years:  Yes [facility]  Physical therapy during the last 6 months: No       When: na                                             Where  na  Labs during the last 12 months: Yes    Education Provided:  [x] Review of [facility]  [x] Agreement Review  [] Compliance Issues Discussed    [] Cognitive Behavior Needs [x] Exercise [] Review of Test [] Financial Issues  [x] Tobacco/Alcohol Use [x] Teaching [] New Patient [] Picture Obtained    Physician Plan:  [] Outgoing Referral  [] Pharmacy Consult  [] Test Ordered   [] Obtained Test Results / Consult Notes  [] UDS due at next visit, verified per EPIC      [] Suspected Physical Abuse or Suicide Risk assessed - IF YES COMPLETE QUESTIONS BELOW    If any of the following questions are answered yes - contact attending physician for referral:    Has been considering harming self to escape stress, pain problems? [] YES  [] NO  Has a suicide plan? [] YES  [] NO  Has attempted suicide in the past?   [] YES  [] NO  Has a close friend or family member who committed suicide? [] YES  [] NO    Patient Referred To : Additional Notes:                                                                       PEG Score    1. What number best describes you pain on average in the past week?         0           1          2         3         4        5        6         7       8         9     10   No Pain

## 2018-10-03 NOTE — PROGRESS NOTES
(being in public). She has tried acetaminophen, lying down and relaxation (staying home, injections) for the symptoms. The treatment provided moderate relief. PEG Score 8.6    Current Pain Assement  Pain Assessment  Pain Assessment: 0-10  Pain Level: 5  Pain Type: Chronic pain  Pain Location: Back  Pain Orientation: Lower, Mid  Pain Radiating Towards: lower back pain  Pain Descriptors: Aching, Constant, Tightness  Pain Frequency: Continuous  Pain Onset: On-going  Clinical Progression: Not changed  Effect of Pain on Daily Activities: limits activities  Patient's Stated Pain Goal: No pain  Pain Intervention(s): Medication (see eMar), Repositioned, Rest  Response to Pain Intervention: Patient Satisfied  Multiple Pain Sites: Yes      Previous Physical Therapy In the last 6 months? Yes [x]  No [x]  Did Physical Therapy make the pain better or worse? Worse  []   Better []     MRI in the two last year? Yes [x]  No  [] OF: MRI of Brain 2018     Injections in the past? Yes [x]  No []  Did the injections help relieve the pain? Yes [x]  No []    Past Medical Histoy  Past Medical History:   Diagnosis Date    ADHD (attention deficit hyperactivity disorder)     Anemia     Anxiety     Cervicalgia     Chiari malformation type I ([facility] [address]     s/p decompression     Chronic back pain     Depression     Headache(784.0)     Hiatal hernia     Hyperlipidemia     Indigestion     Obstructive sleep apnea     Mild with an AHI:  7.5 and  RDI:  14.7       Surgery History  Past Surgical History:   Procedure Laterality Date    BREAST BIOPSY  may 2014    Left     SECTION      x2    CHOLECYSTECTOMY      OTHER SURGICAL HISTORY      Posterior Fossa Decompression in [address] [place] [address] History  family history includes Cancer in her maternal aunt and another family member; High Blood Pressure in her father and sister.     Social History    Social History   Substance Use Topics    Smoking status: Never Smoker    Smokeless tobacco: Never Used    Alcohol use No       Allergies  Ultram [tramadol]     Current Medications  Current Outpatient Prescriptions   Medication Sig Dispense Refill    butalbital-acetaminophen-caffeine (FIORICET, ESGIC) -40 MG per tablet Take 1 tablet by mouth 2 times daily as needed for Headaches 30 tablet 0    ibuprofen (ADVIL;MOTRIN) 800 MG tablet Take 1 tablet by mouth 3 times daily as needed for Pain 90 tablet 5    butalbital-acetaminophen-caffeine (FIORICET, ESGIC) -40 MG per tablet Take 1 tablet by mouth 2 times daily as needed for Headaches or Migraine 30 tablet 0    tiZANidine (ZANAFLEX) 4 MG tablet Take 1/4 tab at breakfast, take 1/4 tab at lunch, take 1/4 tab at supper, take 1/2-1 tablet at bedtime 60 tablet 2    ALPRAZolam (XANAX) 0.5 MG tablet Take 1 tablet by mouth 2 times daily as needed. .  2    zolpidem (AMBIEN) 10 MG tablet Take 1 tablet by mouth nightly. .  2    buPROPion (WELLBUTRIN XL) 300 MG extended release tablet Take 1 tablet by mouth daily  2    amphetamine-dextroamphetamine (ADDERALL) 20 MG tablet Take 1 tablet by mouth 2 times daily. 0    lamoTRIgine (LAMICTAL) 100 MG tablet Take 2.5 tablets by mouth nightly  2     No current facility-administered medications for this encounter. Review of Systems:  Review of Systems   Gastrointestinal: Negative for constipation. Musculoskeletal: Positive for back pain and neck pain. Negative for joint pain. Neurological: Negative for tingling, sensory change, weakness and headaches. 14 point ROS negative besides that noted in HPI    Physical Exam:    Vitals:    10/03/18 1603   BP: 121/79   Pulse: 90   Resp: 18   Temp: 98.3 °F (36.8 °C)   TempSrc: Oral   SpO2: 100%   Weight: 165 lb (74.8 kg)   Height: 5' 4\" (1.626 m)       Body mass index is 28.32 kg/m². Physical Exam   Constitutional: She is oriented to person, place, and time. She appears well-developed and well-nourished. No distress.    HENT:

## 2018-10-04 DIAGNOSIS — G43.711 INTRACTABLE CHRONIC MIGRAINE WITHOUT AURA AND WITH STATUS MIGRAINOSUS: Primary | ICD-10-CM

## 2018-10-25 ENCOUNTER — HOSPITAL ENCOUNTER (OUTPATIENT)
Dept: PAIN MANAGEMENT | Age: 43
Discharge: HOME | End: 2018-10-25
Payer: MEDICARE

## 2018-10-25 VITALS
RESPIRATION RATE: 16 BRPM | TEMPERATURE: 97 F | SYSTOLIC BLOOD PRESSURE: 124 MMHG | OXYGEN SATURATION: 100 % | HEART RATE: 68 BPM | DIASTOLIC BLOOD PRESSURE: 81 MMHG

## 2018-10-25 PROCEDURE — 64405 NJX AA&/STRD GR OCPL NRV: CPT

## 2018-10-25 PROCEDURE — 6360000002 HC RX W HCPCS

## 2018-10-25 PROCEDURE — 2500000003 HC RX 250 WO HCPCS

## 2018-10-25 PROCEDURE — 64405 NJX AA&/STRD GR OCPL NRV: CPT | Performed by: NURSE PRACTITIONER

## 2018-10-25 RX ORDER — DEXAMETHASONE SODIUM PHOSPHATE 10 MG/ML
INJECTION INTRAMUSCULAR; INTRAVENOUS
Status: COMPLETED | OUTPATIENT
Start: 2018-10-25 | End: 2018-10-25

## 2018-10-25 RX ORDER — LIDOCAINE HYDROCHLORIDE 10 MG/ML
INJECTION, SOLUTION EPIDURAL; INFILTRATION; INTRACAUDAL; PERINEURAL
Status: COMPLETED | OUTPATIENT
Start: 2018-10-25 | End: 2018-10-25

## 2018-10-25 RX ADMIN — LIDOCAINE HYDROCHLORIDE 6 ML: 10 INJECTION, SOLUTION EPIDURAL; INFILTRATION; INTRACAUDAL; PERINEURAL at 09:58

## 2018-10-25 RX ADMIN — DEXAMETHASONE SODIUM PHOSPHATE 20 MG: 10 INJECTION INTRAMUSCULAR; INTRAVENOUS at 09:59

## 2018-11-07 ENCOUNTER — HOSPITAL ENCOUNTER (OUTPATIENT)
Dept: PAIN MANAGEMENT | Age: 43
Discharge: HOME | End: 2018-11-07
Payer: MEDICARE

## 2018-11-07 VITALS
SYSTOLIC BLOOD PRESSURE: 121 MMHG | OXYGEN SATURATION: 96 % | DIASTOLIC BLOOD PRESSURE: 77 MMHG | HEART RATE: 89 BPM | RESPIRATION RATE: 20 BRPM | TEMPERATURE: 96.9 F

## 2018-11-07 DIAGNOSIS — M79.18 MYOFASCIAL PAIN: ICD-10-CM

## 2018-11-07 PROCEDURE — 20553 NJX 1/MLT TRIGGER POINTS 3/>: CPT | Performed by: NURSE PRACTITIONER

## 2018-11-07 PROCEDURE — 6360000002 HC RX W HCPCS

## 2018-11-07 PROCEDURE — 20553 NJX 1/MLT TRIGGER POINTS 3/>: CPT

## 2018-11-07 PROCEDURE — 2500000003 HC RX 250 WO HCPCS

## 2018-11-07 RX ORDER — LIDOCAINE HYDROCHLORIDE 10 MG/ML
INJECTION, SOLUTION EPIDURAL; INFILTRATION; INTRACAUDAL; PERINEURAL
Status: COMPLETED | OUTPATIENT
Start: 2018-11-07 | End: 2018-11-07

## 2018-11-07 RX ORDER — KETOROLAC TROMETHAMINE 30 MG/ML
45 INJECTION, SOLUTION INTRAMUSCULAR; INTRAVENOUS ONCE
Status: DISCONTINUED | OUTPATIENT
Start: 2018-11-07 | End: 2018-11-09 | Stop reason: HOSPADM

## 2018-11-07 RX ORDER — BUPIVACAINE HYDROCHLORIDE 5 MG/ML
INJECTION, SOLUTION EPIDURAL; INTRACAUDAL
Status: COMPLETED | OUTPATIENT
Start: 2018-11-07 | End: 2018-11-07

## 2018-11-07 RX ADMIN — BUPIVACAINE HYDROCHLORIDE 15 ML: 5 INJECTION, SOLUTION EPIDURAL; INTRACAUDAL at 15:22

## 2018-11-07 RX ADMIN — LIDOCAINE HYDROCHLORIDE 15 ML: 10 INJECTION, SOLUTION EPIDURAL; INFILTRATION; INTRACAUDAL; PERINEURAL at 15:23

## 2018-11-07 ASSESSMENT — PAIN - FUNCTIONAL ASSESSMENT: PAIN_FUNCTIONAL_ASSESSMENT: 0-10

## 2018-11-07 NOTE — PROCEDURES
Lidocaine Plain and 5 ml of 0.5% Marcaine Plain    [] with Decadron 0.5 ml (10mg/ml)  [x] with Toradol 0.5 ml (15mg/ml)  (approximately 20 ml total) using a 25 gauge 1 1/2 inch needle. Sterile dressings applied. Discharge: The patient tolerated the procedure well. There were no complications during the procedure and the patient was discharged home with discharge instructions. The patient has been instructed to contact the office should there be any complications or questions to arise between today and their next appointment.     Plan:  [x] Will return to the office in   [x] 1 month  [] 4 - 6 weeks   [] 2 months   [] 3 months for:  [x] Planned Procedure  [] Procedure Follow-up   [] Office Visit      AMNA Caraballo CNP, 11/7/2018 at 3:33 PM

## 2018-11-14 ENCOUNTER — HOSPITAL ENCOUNTER (OUTPATIENT)
Dept: PAIN MANAGEMENT | Age: 43
Discharge: HOME | End: 2018-11-14
Payer: MEDICARE

## 2018-11-14 VITALS
DIASTOLIC BLOOD PRESSURE: 84 MMHG | TEMPERATURE: 97.4 F | HEART RATE: 89 BPM | RESPIRATION RATE: 16 BRPM | SYSTOLIC BLOOD PRESSURE: 122 MMHG | OXYGEN SATURATION: 100 %

## 2018-11-14 DIAGNOSIS — G44.229 CHRONIC TENSION-TYPE HEADACHE, NOT INTRACTABLE: ICD-10-CM

## 2018-11-14 PROCEDURE — 20553 NJX 1/MLT TRIGGER POINTS 3/>: CPT

## 2018-11-14 PROCEDURE — 20553 NJX 1/MLT TRIGGER POINTS 3/>: CPT | Performed by: NURSE PRACTITIONER

## 2018-11-14 PROCEDURE — 6360000002 HC RX W HCPCS

## 2018-11-14 PROCEDURE — 2500000003 HC RX 250 WO HCPCS

## 2018-11-14 RX ORDER — BUPIVACAINE HYDROCHLORIDE 5 MG/ML
INJECTION, SOLUTION EPIDURAL; INTRACAUDAL
Status: COMPLETED | OUTPATIENT
Start: 2018-11-14 | End: 2018-11-14

## 2018-11-14 RX ORDER — KETOROLAC TROMETHAMINE 30 MG/ML
30 INJECTION, SOLUTION INTRAMUSCULAR; INTRAVENOUS ONCE
Status: COMPLETED | OUTPATIENT
Start: 2018-11-14 | End: 2018-11-14

## 2018-11-14 RX ORDER — LIDOCAINE HYDROCHLORIDE 10 MG/ML
INJECTION, SOLUTION EPIDURAL; INFILTRATION; INTRACAUDAL; PERINEURAL
Status: COMPLETED | OUTPATIENT
Start: 2018-11-14 | End: 2018-11-14

## 2018-11-14 RX ADMIN — LIDOCAINE HYDROCHLORIDE 10 ML: 10 INJECTION, SOLUTION EPIDURAL; INFILTRATION; INTRACAUDAL; PERINEURAL at 15:04

## 2018-11-14 RX ADMIN — BUPIVACAINE HYDROCHLORIDE 9 ML: 5 INJECTION, SOLUTION EPIDURAL; INTRACAUDAL at 15:03

## 2018-11-14 RX ADMIN — KETOROLAC TROMETHAMINE 30 MG: 30 INJECTION, SOLUTION INTRAMUSCULAR; INTRAVENOUS at 15:05

## 2018-11-14 ASSESSMENT — PAIN SCALES - GENERAL: PAINLEVEL_OUTOF10: 4

## 2018-11-15 RX ORDER — BUTALBITAL, ACETAMINOPHEN AND CAFFEINE 50; 325; 40 MG/1; MG/1; MG/1
1 TABLET ORAL 2 TIMES DAILY PRN
Qty: 30 TABLET | Refills: 0 | Status: SHIPPED | OUTPATIENT
Start: 2018-11-15 | End: 2018-12-20

## 2018-12-13 ENCOUNTER — HOSPITAL ENCOUNTER (OUTPATIENT)
Dept: PAIN MANAGEMENT | Age: 43
Discharge: HOME | End: 2018-12-13
Payer: MEDICARE

## 2018-12-13 VITALS
OXYGEN SATURATION: 100 % | BODY MASS INDEX: 28.68 KG/M2 | WEIGHT: 168 LBS | DIASTOLIC BLOOD PRESSURE: 75 MMHG | RESPIRATION RATE: 18 BRPM | SYSTOLIC BLOOD PRESSURE: 122 MMHG | HEIGHT: 64 IN | TEMPERATURE: 97.9 F | HEART RATE: 95 BPM

## 2018-12-13 DIAGNOSIS — M79.18 MYOFASCIAL PAIN: ICD-10-CM

## 2018-12-13 PROCEDURE — 99213 OFFICE O/P EST LOW 20 MIN: CPT

## 2018-12-13 PROCEDURE — 99213 OFFICE O/P EST LOW 20 MIN: CPT | Performed by: NURSE PRACTITIONER

## 2018-12-13 RX ORDER — BUTALBITAL, ACETAMINOPHEN AND CAFFEINE 50; 325; 40 MG/1; MG/1; MG/1
1 TABLET ORAL 2 TIMES DAILY PRN
Qty: 30 TABLET | Refills: 2 | Status: SHIPPED | OUTPATIENT
Start: 2018-12-13 | End: 2019-05-02 | Stop reason: SDUPTHER

## 2018-12-13 RX ORDER — SENNOSIDES 8.6 MG
650 CAPSULE ORAL EVERY 8 HOURS PRN
Qty: 60 TABLET | Refills: 3 | Status: SHIPPED | OUTPATIENT
Start: 2018-12-13 | End: 2020-03-04

## 2018-12-13 RX ORDER — TIZANIDINE 4 MG/1
4 TABLET ORAL 3 TIMES DAILY
Qty: 90 TABLET | Refills: 2 | Status: SHIPPED | OUTPATIENT
Start: 2018-12-13 | End: 2019-05-02 | Stop reason: SDUPTHER

## 2018-12-13 RX ORDER — BUTALBITAL, ACETAMINOPHEN AND CAFFEINE 50; 325; 40 MG/1; MG/1; MG/1
1 TABLET ORAL 2 TIMES DAILY PRN
Qty: 30 TABLET | Refills: 2 | Status: SHIPPED | OUTPATIENT
Start: 2018-12-13 | End: 2018-12-13 | Stop reason: SDUPTHER

## 2018-12-13 ASSESSMENT — PAIN DESCRIPTION - ONSET: ONSET: ON-GOING

## 2018-12-13 ASSESSMENT — PAIN DESCRIPTION - FREQUENCY: FREQUENCY: CONTINUOUS

## 2018-12-13 ASSESSMENT — ENCOUNTER SYMPTOMS
CONSTIPATION: 0
BACK PAIN: 1

## 2018-12-13 ASSESSMENT — PAIN SCALES - GENERAL: PAINLEVEL_OUTOF10: 5

## 2018-12-13 ASSESSMENT — ACTIVITIES OF DAILY LIVING (ADL): EFFECT OF PAIN ON DAILY ACTIVITIES: LIMITS ACTIVITY

## 2018-12-13 ASSESSMENT — PAIN DESCRIPTION - DIRECTION: RADIATING_TOWARDS: CHRONIC HEADACHES

## 2018-12-13 ASSESSMENT — PAIN DESCRIPTION - ORIENTATION: ORIENTATION: UPPER;MID;LOWER

## 2018-12-13 ASSESSMENT — PAIN DESCRIPTION - PAIN TYPE: TYPE: CHRONIC PAIN

## 2018-12-13 ASSESSMENT — PAIN DESCRIPTION - PROGRESSION: CLINICAL_PROGRESSION: NOT CHANGED

## 2018-12-13 ASSESSMENT — PAIN DESCRIPTION - LOCATION: LOCATION: BACK;NECK;HEAD

## 2018-12-13 NOTE — PROGRESS NOTES
[facility] Physical & Pain Medicine  Office Note    Patient Name: Bob Beckett    MR #: [id number]    Account #: [de-identified]    : 1975    Age: 39 y.o. Sex: female    Date: 2018    PCP: Merilynn Boast, PA    Chief Complaint:   Chief Complaint   Patient presents with    Neck Pain    Headache    Back Pain       History of Present Illness:   Bob Beckett is a 39 y.o. female who presents to the office for a follow up procedure. Patient had bilateral occipital nerve block injections on 10/25/2018. Patient states she received 100% relief for 4 weeks. Patient had lumbar and thoracic trigger point injections with Toradol on 2018. Patient states she received 90% relief for 3 weeks from injections. Patient had cervical trigger point injections with toradol on 2018. Patient states she received 90% relief for 3 weeks from injections. Patient went to outpatient [facility]opedic institute for left foot pain. Patient states they did an xray which showed she had previously had a fracture but it had already started to heal. Patient states she was given a boot to where. Patient states the Dr told her she may have some tendonitis in her foot still causing her pain. Patient states that she notices a return of her migraines about 3 weeks after her injection of medication she is getting through neurology. She states that she does not have to take her Fioricet until that time. 10/3/2018  Bob Beckett is a 39 y.o. female who presents to the office For follow-up of cervical, thoracic, lumbar trigger points with Toradol on 2018 she has had greater than 70% relief for greater than 1-1/2 months. She is still having relief from injections. Patient had greater than 50% relief from bilateral occipital nerve blocks on 2018. Patient stated that as long as she stays at home that her pain has significantly improved since treatment.  However she has realized that is when she is in the pain better or worse? Worse  []   Better []     MRI in the two last year? Yes [x]  No  [] OF: MRI of Brain 2018     Injections in the past? Yes [x]  No []  Did the injections help relieve the pain? Yes [x]  No []    Past Medical Histoy  Past Medical History:   Diagnosis Date    ADHD (attention deficit hyperactivity disorder)     Anemia     Anxiety     Cervicalgia     Chiari malformation type I ([facility] [address]     s/p decompression     Chronic back pain     Depression     Headache(784.0)     Hiatal hernia     Hyperlipidemia     Indigestion     Obstructive sleep apnea     Mild with an AHI:  7.5 and  RDI:  14.7       Surgery History  Past Surgical History:   Procedure Laterality Date    BREAST BIOPSY  may 2014    Left     SECTION      x2    CHOLECYSTECTOMY      OTHER SURGICAL HISTORY      Posterior Fossa Decompression in  [place] History  family history includes Cancer in her maternal aunt and another family member; High Blood Pressure in her father and sister.     Social History    Social History   Substance Use Topics    Smoking status: Never Smoker    Smokeless tobacco: Never Used    Alcohol use No       Allergies  Ultram [tramadol]     Current Medications  Current Outpatient Prescriptions   Medication Sig Dispense Refill    acetaminophen (TYLENOL) 650 MG extended release tablet Take 1 tablet by mouth every 8 hours as needed for Pain 60 tablet 3    tiZANidine (ZANAFLEX) 4 MG tablet Take 1 tablet by mouth 3 times daily 90 tablet 2    butalbital-acetaminophen-caffeine (FIORICET, ESGIC) -40 MG per tablet Take 1 tablet by mouth 2 times daily as needed for Headaches or Migraine 30 tablet 2    butalbital-acetaminophen-caffeine (FIORICET, ESGIC) -40 MG per tablet Take 1 tablet by mouth 2 times daily as needed for Headaches 30 tablet 0    Erenumab-aooe (AIMOVIG) 70 MG/ML SOAJ Inject 70 mg into the skin every 30 days 1 pen 11    ibuprofen (ADVIL;MOTRIN)

## 2018-12-20 ENCOUNTER — OFFICE VISIT (OUTPATIENT)
Dept: NEUROSURGERY | Age: 43
End: 2018-12-20
Payer: MEDICARE

## 2018-12-20 VITALS
DIASTOLIC BLOOD PRESSURE: 79 MMHG | HEIGHT: 64 IN | SYSTOLIC BLOOD PRESSURE: 126 MMHG | BODY MASS INDEX: 28.68 KG/M2 | WEIGHT: 168 LBS | HEART RATE: 92 BPM

## 2018-12-20 DIAGNOSIS — G43.009 MIGRAINE WITHOUT AURA AND WITHOUT STATUS MIGRAINOSUS, NOT INTRACTABLE: Primary | ICD-10-CM

## 2018-12-20 DIAGNOSIS — G93.5 CHIARI MALFORMATION TYPE I (HCC): ICD-10-CM

## 2018-12-20 PROCEDURE — 99213 OFFICE O/P EST LOW 20 MIN: CPT | Performed by: NURSE PRACTITIONER

## 2018-12-20 PROCEDURE — G8427 DOCREV CUR MEDS BY ELIG CLIN: HCPCS | Performed by: NURSE PRACTITIONER

## 2018-12-20 PROCEDURE — 1036F TOBACCO NON-USER: CPT | Performed by: NURSE PRACTITIONER

## 2018-12-20 PROCEDURE — G8419 CALC BMI OUT NRM PARAM NOF/U: HCPCS | Performed by: NURSE PRACTITIONER

## 2018-12-20 PROCEDURE — G8484 FLU IMMUNIZE NO ADMIN: HCPCS | Performed by: NURSE PRACTITIONER

## 2018-12-20 NOTE — PROGRESS NOTES
[facility] Neurology Office Note      Patient:   Petra Barreto  MR#:    [id number]  Account Number:                         YOB: 1975  Date of Evaluation:  2018  Time of Note:                          3:31 PM  Primary/Referring Physician:  CALI Rossi   Consulting Physician:  AMNA De La Torre    FOLLOW UP    Chief Complaint   Patient presents with    Follow-up     3 month    Headache     Pt reports improvements, has noted that she starts having headaches more the week prior to next injection but still states it has improved. HISTORY OF PRESENT ILLNESS    Petra Barreto is a 39y.o. year old female here for follow up of headaches. Has noted improvement with headaches with Aimovig, noting breakthrough headaches near the time that next Aimovig dose is due. No change in characteristics of headaches. Headache pain starts in the base her neck bilaterally with occasional radiation to bilateral frontal areas. She does not note light or sound sensitivity or nausea and vomiting. Headaches can last for several hours to all day. She has previously tried and failed Topamax, Amitriptyline, Propranolol, Imitrex and Botox. She has tried supplementing with magnesium, feverfew and butterbur as well. She sees pain management for trigger point injections. She has a history of chiari malformation decompression in . Headaches did initially improve after surgery. No other complaints today.      Past Medical History:   Diagnosis Date    ADHD (attention deficit hyperactivity disorder)     Anemia     Anxiety     Cervicalgia     Chiari malformation type I ([facility] [address]     s/p decompression     Chronic back pain     Depression     Headache(784.0)     Hiatal hernia     Hyperlipidemia     Indigestion     Obstructive sleep apnea     Mild with an AHI:  7.5 and  RDI:  14.7       Past Surgical History:   Procedure Laterality Date    BREAST BIOPSY  may 2014    Left     SECTION      x2

## 2018-12-20 NOTE — PROGRESS NOTES
REVIEW OF SYSTEMS    Constitutional: []Fever []Sweat []Chills [] Recent Injury [x] Denies all unless marked  HEENT:[x]Headache  [] Head Injury/Hearing Loss  [] Sore Throat  [] Ear Ache/Dizziness  [x] Denies all unless marked  Spine:  [x] Neck pain  [x] Back pain  [] Sciaticia  [x] Denies all unless marked  Cardiovascular:[]Heart Disease []Chest Pain [] Palpitations  [x] Denies all unless marked  Pulmonary: []Shortness of Breath []Cough   [x] Denies all unless marke  Gastrointestinal: []Nausea  []Vomiting  []Abdominal Pain  [x]Constipation  []Diarrhea  []Dark Bloody Stools  [x] Denies all unless marked  Psychiatric/Behavioral:[] Depression [] Anxiety [x] Denies all unless marked  Genitourinary:   [] Frequency  [] Urgency  [] Incontinence [] Pain with Urination  [x] Denies all unless marked  Extremities: []Pain  []Swelling  [x] Denies all unless marked  Musculoskeletal: [] Muscle Pain  [x] Joint Pain  [x] Arthritis [] Muscle Cramps [] Muscle Twitches  [x] Denies all unless marked  Sleep: [] Insomnia [] Snoring [] Restless Legs [] Sleep Apnea  [] Daytime Sleepiness  [x] Denies all unless marked  Skin:[] Rash [] Skin Discoloration [x] Denies all unless marked   Neurological: []Visual Disturbance/Memory Loss [] Loss of Balance [] Slurred Speech/Weakness [] Seizures  [] Vertigo/Dizziness [x] Denies all unless marked

## 2019-02-20 ENCOUNTER — HOSPITAL ENCOUNTER (OUTPATIENT)
Dept: PAIN MANAGEMENT | Age: 44
Discharge: HOME | End: 2019-02-20
Payer: MEDICARE

## 2019-02-20 VITALS
HEART RATE: 92 BPM | RESPIRATION RATE: 16 BRPM | TEMPERATURE: 98.1 F | OXYGEN SATURATION: 99 % | SYSTOLIC BLOOD PRESSURE: 121 MMHG | DIASTOLIC BLOOD PRESSURE: 75 MMHG

## 2019-02-20 DIAGNOSIS — M54.81 BILATERAL OCCIPITAL NEURALGIA: ICD-10-CM

## 2019-02-20 PROCEDURE — 64405 NJX AA&/STRD GR OCPL NRV: CPT | Performed by: NURSE PRACTITIONER

## 2019-02-20 PROCEDURE — 6360000002 HC RX W HCPCS: Performed by: NURSE PRACTITIONER

## 2019-02-20 PROCEDURE — 64405 NJX AA&/STRD GR OCPL NRV: CPT

## 2019-02-20 PROCEDURE — 20553 NJX 1/MLT TRIGGER POINTS 3/>: CPT

## 2019-02-20 PROCEDURE — 20553 NJX 1/MLT TRIGGER POINTS 3/>: CPT | Performed by: NURSE PRACTITIONER

## 2019-02-20 PROCEDURE — 6360000002 HC RX W HCPCS

## 2019-02-20 RX ORDER — LIDOCAINE HYDROCHLORIDE 10 MG/ML
INJECTION, SOLUTION EPIDURAL; INFILTRATION; INTRACAUDAL; PERINEURAL
Status: COMPLETED | OUTPATIENT
Start: 2019-02-20 | End: 2019-02-20

## 2019-02-20 RX ORDER — BUPIVACAINE HYDROCHLORIDE 5 MG/ML
INJECTION, SOLUTION EPIDURAL; INTRACAUDAL
Status: COMPLETED | OUTPATIENT
Start: 2019-02-20 | End: 2019-02-20

## 2019-02-20 RX ORDER — KETOROLAC TROMETHAMINE 30 MG/ML
60 INJECTION, SOLUTION INTRAMUSCULAR; INTRAVENOUS ONCE
Status: COMPLETED | OUTPATIENT
Start: 2019-02-20 | End: 2019-02-20

## 2019-02-20 RX ORDER — DEXAMETHASONE SODIUM PHOSPHATE 10 MG/ML
20 INJECTION INTRAMUSCULAR; INTRAVENOUS ONCE
Status: COMPLETED | OUTPATIENT
Start: 2019-02-20 | End: 2019-02-20

## 2019-02-20 RX ADMIN — KETOROLAC TROMETHAMINE 60 MG: 30 INJECTION, SOLUTION INTRAMUSCULAR; INTRAVENOUS at 13:18

## 2019-02-20 RX ADMIN — DEXAMETHASONE SODIUM PHOSPHATE 20 MG: 10 INJECTION INTRAMUSCULAR; INTRAVENOUS at 13:18

## 2019-02-20 RX ADMIN — BUPIVACAINE HYDROCHLORIDE 12 ML: 5 INJECTION, SOLUTION EPIDURAL; INTRACAUDAL at 13:16

## 2019-02-20 RX ADMIN — LIDOCAINE HYDROCHLORIDE 12 ML: 10 INJECTION, SOLUTION EPIDURAL; INFILTRATION; INTRACAUDAL; PERINEURAL at 13:16

## 2019-02-20 ASSESSMENT — PAIN SCALES - GENERAL: PAINLEVEL_OUTOF10: 7

## 2019-02-20 ASSESSMENT — PAIN - FUNCTIONAL ASSESSMENT: PAIN_FUNCTIONAL_ASSESSMENT: 0-10

## 2019-02-25 ENCOUNTER — TELEPHONE (OUTPATIENT)
Dept: PAIN MANAGEMENT | Age: 44
End: 2019-02-25

## 2019-02-25 NOTE — TELEPHONE ENCOUNTER
I called Yesenia as a follow up from her bilateral ONB's and cervical tp's with toradol that she had on 2/20 with Justine Valladares. Yesenia was not home so we LVM.

## 2019-03-06 ENCOUNTER — HOSPITAL ENCOUNTER (OUTPATIENT)
Dept: PAIN MANAGEMENT | Age: 44
Discharge: HOME | End: 2019-03-06
Payer: MEDICARE

## 2019-03-06 VITALS
SYSTOLIC BLOOD PRESSURE: 133 MMHG | OXYGEN SATURATION: 100 % | TEMPERATURE: 97.9 F | HEART RATE: 84 BPM | DIASTOLIC BLOOD PRESSURE: 88 MMHG | RESPIRATION RATE: 18 BRPM

## 2019-03-06 PROCEDURE — 6360000002 HC RX W HCPCS

## 2019-03-06 PROCEDURE — 20553 NJX 1/MLT TRIGGER POINTS 3/>: CPT

## 2019-03-06 PROCEDURE — 20553 NJX 1/MLT TRIGGER POINTS 3/>: CPT | Performed by: NURSE PRACTITIONER

## 2019-03-06 PROCEDURE — 2500000003 HC RX 250 WO HCPCS

## 2019-03-06 RX ORDER — KETOROLAC TROMETHAMINE 30 MG/ML
INJECTION, SOLUTION INTRAMUSCULAR; INTRAVENOUS
Status: COMPLETED | OUTPATIENT
Start: 2019-03-06 | End: 2019-03-06

## 2019-03-06 RX ORDER — LIDOCAINE HYDROCHLORIDE 10 MG/ML
INJECTION, SOLUTION EPIDURAL; INFILTRATION; INTRACAUDAL; PERINEURAL
Status: COMPLETED | OUTPATIENT
Start: 2019-03-06 | End: 2019-03-06

## 2019-03-06 RX ORDER — BUPIVACAINE HYDROCHLORIDE 5 MG/ML
INJECTION, SOLUTION EPIDURAL; INTRACAUDAL
Status: COMPLETED | OUTPATIENT
Start: 2019-03-06 | End: 2019-03-06

## 2019-03-06 RX ADMIN — BUPIVACAINE HYDROCHLORIDE 15 ML: 5 INJECTION, SOLUTION EPIDURAL; INTRACAUDAL at 13:20

## 2019-03-06 RX ADMIN — KETOROLAC TROMETHAMINE 60 MG: 30 INJECTION, SOLUTION INTRAMUSCULAR; INTRAVENOUS at 13:21

## 2019-03-06 RX ADMIN — LIDOCAINE HYDROCHLORIDE 15 ML: 10 INJECTION, SOLUTION EPIDURAL; INFILTRATION; INTRACAUDAL; PERINEURAL at 13:20

## 2019-03-06 ASSESSMENT — PAIN DESCRIPTION - PAIN TYPE: TYPE: CHRONIC PAIN

## 2019-03-08 ENCOUNTER — TELEPHONE (OUTPATIENT)
Dept: PAIN MANAGEMENT | Age: 44
End: 2019-03-08

## 2019-03-08 DIAGNOSIS — M79.18 MYOFASCIAL PAIN: ICD-10-CM

## 2019-03-25 ENCOUNTER — OFFICE VISIT (OUTPATIENT)
Dept: NEUROSURGERY | Age: 44
End: 2019-03-25
Payer: MEDICARE

## 2019-03-25 VITALS
OXYGEN SATURATION: 100 % | SYSTOLIC BLOOD PRESSURE: 120 MMHG | WEIGHT: 159 LBS | BODY MASS INDEX: 28.17 KG/M2 | HEIGHT: 63 IN | DIASTOLIC BLOOD PRESSURE: 84 MMHG | HEART RATE: 89 BPM

## 2019-03-25 DIAGNOSIS — G43.009 MIGRAINE WITHOUT AURA AND WITHOUT STATUS MIGRAINOSUS, NOT INTRACTABLE: Primary | ICD-10-CM

## 2019-03-25 DIAGNOSIS — Z86.69 HISTORY OF CHIARI MALFORMATION: ICD-10-CM

## 2019-03-25 PROCEDURE — G8427 DOCREV CUR MEDS BY ELIG CLIN: HCPCS | Performed by: NURSE PRACTITIONER

## 2019-03-25 PROCEDURE — G8419 CALC BMI OUT NRM PARAM NOF/U: HCPCS | Performed by: NURSE PRACTITIONER

## 2019-03-25 PROCEDURE — 1036F TOBACCO NON-USER: CPT | Performed by: NURSE PRACTITIONER

## 2019-03-25 PROCEDURE — 99213 OFFICE O/P EST LOW 20 MIN: CPT | Performed by: NURSE PRACTITIONER

## 2019-03-25 PROCEDURE — G8484 FLU IMMUNIZE NO ADMIN: HCPCS | Performed by: NURSE PRACTITIONER

## 2019-05-02 ENCOUNTER — HOSPITAL ENCOUNTER (OUTPATIENT)
Dept: PAIN MANAGEMENT | Age: 44
Discharge: HOME | End: 2019-05-02
Payer: MEDICARE

## 2019-05-02 VITALS
BODY MASS INDEX: 27.84 KG/M2 | HEIGHT: 63 IN | WEIGHT: 157.13 LBS | HEART RATE: 96 BPM | RESPIRATION RATE: 18 BRPM | DIASTOLIC BLOOD PRESSURE: 81 MMHG | OXYGEN SATURATION: 100 % | TEMPERATURE: 98.8 F | SYSTOLIC BLOOD PRESSURE: 131 MMHG

## 2019-05-02 DIAGNOSIS — M79.18 MYOFASCIAL PAIN: ICD-10-CM

## 2019-05-02 PROCEDURE — 99214 OFFICE O/P EST MOD 30 MIN: CPT | Performed by: NURSE PRACTITIONER

## 2019-05-02 PROCEDURE — 99214 OFFICE O/P EST MOD 30 MIN: CPT

## 2019-05-02 RX ORDER — BUTALBITAL, ACETAMINOPHEN AND CAFFEINE 50; 325; 40 MG/1; MG/1; MG/1
1 TABLET ORAL 2 TIMES DAILY PRN
Qty: 30 TABLET | Refills: 2 | Status: SHIPPED | OUTPATIENT
Start: 2019-05-02 | End: 2019-08-08 | Stop reason: SDUPTHER

## 2019-05-02 RX ORDER — IBUPROFEN 800 MG/1
800 TABLET ORAL 3 TIMES DAILY PRN
Qty: 90 TABLET | Refills: 5 | Status: SHIPPED | OUTPATIENT
Start: 2019-05-02 | End: 2019-08-08 | Stop reason: SDUPTHER

## 2019-05-02 RX ORDER — TIZANIDINE 4 MG/1
4 TABLET ORAL 3 TIMES DAILY
Qty: 90 TABLET | Refills: 2 | Status: SHIPPED | OUTPATIENT
Start: 2019-05-02 | End: 2019-08-08 | Stop reason: ALTCHOICE

## 2019-05-02 ASSESSMENT — PAIN - FUNCTIONAL ASSESSMENT: PAIN_FUNCTIONAL_ASSESSMENT: PREVENTS OR INTERFERES SOME ACTIVE ACTIVITIES AND ADLS

## 2019-05-02 ASSESSMENT — PAIN DESCRIPTION - DIRECTION: RADIATING_TOWARDS: CHRONIC HEADACHES

## 2019-05-02 ASSESSMENT — PAIN DESCRIPTION - ONSET: ONSET: ON-GOING

## 2019-05-02 ASSESSMENT — PAIN DESCRIPTION - PAIN TYPE: TYPE: CHRONIC PAIN

## 2019-05-02 ASSESSMENT — PAIN SCALES - GENERAL: PAINLEVEL_OUTOF10: 8

## 2019-05-02 ASSESSMENT — PAIN DESCRIPTION - DESCRIPTORS: DESCRIPTORS: ACHING;CONSTANT;CRAMPING

## 2019-05-02 ASSESSMENT — PAIN DESCRIPTION - FREQUENCY: FREQUENCY: CONTINUOUS

## 2019-05-02 ASSESSMENT — PAIN DESCRIPTION - LOCATION: LOCATION: BACK;NECK;HEAD

## 2019-05-02 ASSESSMENT — ACTIVITIES OF DAILY LIVING (ADL): EFFECT OF PAIN ON DAILY ACTIVITIES: LIMITS ACTIVITY

## 2019-05-02 ASSESSMENT — PAIN DESCRIPTION - PROGRESSION: CLINICAL_PROGRESSION: NOT CHANGED

## 2019-05-02 ASSESSMENT — PAIN DESCRIPTION - ORIENTATION: ORIENTATION: LOWER;MID;UPPER

## 2019-05-02 NOTE — H&P
[facility] Physical & Pain Medicine    Annual History and Physical    Patient Name: Maria C Ridley    MR #: [id number]    Account [de-identified]    : 1975    Age: 38 y.o. Sex: female    Date: May 2, 2019    PCP: CALI Smith    Chief Complaint:   Chief Complaint   Patient presents with    Neck Pain    Back Pain    Migraine       History of Present Illness: The patient is a 38 y.o. female who presents to the office for annual exam with primary complaints of neck pain, chronic headaches, back pain. Patient following up after having the procedures listed below  Bilateral ONB and Cervical trigger points 19: 75% relief for almost 3 months  Thoracic and Lumbar trigger points 3/6/19: 75% relief for almost 2 months    This patient was referred to this office last April. Patient has a history of chiari malformation. Patient had a posterior fossa decompression and her pain had pretty much resolved for approximately 3 years. However during the year prior to coming back to this office patient started developing pain again. She started having chronic headaches. Along with myofascial pain in the shoulders and thoracic spine. Through the years patient has been on numerous medications to treat headaches including the following but not limited to Topamax, amitriptyline, propranolol, Imitrex, gabapentin, and Lyrica, Fioricet does help with the intense headaches but he does not make headaches tolerable. She does follow with neurology and she has been started on Aimovig. And this is helping. Patient has been getting bilateral occipital nerve blocks along with trigger point injections in her cervical, thoracic, and lumbar spine and this is helped with her headaches as well along with her back pain.       Opioids Prescribed: No    Last Filled: N/A    Was medication brought the appointment today:  N/A    Current PE    Past PE.3    Annual Screens:    ORT Score: 7    PHQ-9 Score: 18    Current Pain Assessment  Pain Assessment  Pain Assessment: 0-10  Pain Level: 8  Patient's Stated Pain Goal: No pain  Pain Type: Chronic pain  Pain Location: Back, Neck, Head  Pain Orientation: Lower, Mid, Upper  Pain Radiating Towards: chronic headaches  Pain Descriptors: Aching, Constant, Cramping  Pain Frequency: Continuous  Pain Onset: On-going  Clinical Progression: Not changed  Effect of Pain on Daily Activities: limits activity  Functional Pain Assessment: Prevents or interferes some active activities and ADLs  Non-Pharmaceutical Pain Intervention(s): Repositioned, Rest  Response to Pain Intervention: None  Multiple Pain Sites: No  POSS Score (Patient Ctrl Analgesia): 1    Employment: Disabled    Previous Injury: No    Previous Physical Therapy In the last 6 months? No    Did Physical Therapy make the pain better? N/a     MRI in the two last year? Yes Brain 2018  OF: Cynthia see images below    CT scan in last 12 months? No    X-ray last 12 months? Yes May 2018 Cynthia    Nerve Conduction Study / EMG? No    Injections in the past? Yes    Did the injections help relieve the pain?  Yes    Past Medical History  Past Medical History:   Diagnosis Date    ADHD (attention deficit hyperactivity disorder)     Anemia     Anxiety     Cervicalgia     [facility]     s/p decompression 2014    Chronic back pain     Depression     Headache(784.0)     Hiatal hernia     Hyperlipidemia     Indigestion     Obstructive sleep apnea     Mild with an AHI:  7.5 and  RDI:  14.7       Medications  Current Outpatient Medications   Medication Sig Dispense Refill    Erenumab-aooe (AIMOVIG 140 DOSE) 70 MG/ML SOAJ Inject 140 mg into the skin every 30 days 2 pen 5    acetaminophen (TYLENOL) 650 MG extended release tablet Take 1 tablet by mouth every 8 hours as needed for Pain 60 tablet 3    tiZANidine (ZANAFLEX) 4 MG tablet Take 1 tablet by mouth 3 times daily 90 tablet 2    butalbital-acetaminophen-caffeine (FIORICET, ESGIC) -40 MG per tablet Take 1 tablet by mouth 2 times daily as needed for Headaches or Migraine 30 tablet 2    ibuprofen (ADVIL;MOTRIN) 800 MG tablet Take 1 tablet by mouth 3 times daily as needed for Pain 90 tablet 5    ALPRAZolam (XANAX) 0.5 MG tablet Take 1 tablet by mouth 2 times daily as needed. .  2    zolpidem (AMBIEN) 10 MG tablet Take 1 tablet by mouth nightly. .  2    buPROPion (WELLBUTRIN XL) 300 MG extended release tablet Take 1 tablet by mouth daily  2    amphetamine-dextroamphetamine (ADDERALL) 20 MG tablet Take 1 tablet by mouth 2 times daily. 0    lamoTRIgine (LAMICTAL) 100 MG tablet Take 2.5 tablets by mouth nightly  2     No current facility-administered medications for this encounter. Allergies  Ultram [tramadol]    Current Medications  Current Outpatient Medications   Medication Sig Dispense Refill    Erenumab-aooe (AIMOVIG 140 DOSE) 70 MG/ML SOAJ Inject 140 mg into the skin every 30 days 2 pen 5    acetaminophen (TYLENOL) 650 MG extended release tablet Take 1 tablet by mouth every 8 hours as needed for Pain 60 tablet 3    tiZANidine (ZANAFLEX) 4 MG tablet Take 1 tablet by mouth 3 times daily 90 tablet 2    butalbital-acetaminophen-caffeine (FIORICET, ESGIC) -40 MG per tablet Take 1 tablet by mouth 2 times daily as needed for Headaches or Migraine 30 tablet 2    ibuprofen (ADVIL;MOTRIN) 800 MG tablet Take 1 tablet by mouth 3 times daily as needed for Pain 90 tablet 5    ALPRAZolam (XANAX) 0.5 MG tablet Take 1 tablet by mouth 2 times daily as needed. .  2    zolpidem (AMBIEN) 10 MG tablet Take 1 tablet by mouth nightly. .  2    buPROPion (WELLBUTRIN XL) 300 MG extended release tablet Take 1 tablet by mouth daily  2    amphetamine-dextroamphetamine (ADDERALL) 20 MG tablet Take 1 tablet by mouth 2 times daily.   0    lamoTRIgine (LAMICTAL) 100 MG tablet Take 2.5 tablets by mouth nightly  2     No current facility-administered medications for this encounter. Social History    Social History     Socioeconomic History    Marital status:      Spouse name: None    Number of children: None    Years of education: None    Highest education level: None   Occupational History    None   Social Needs    Financial resource strain: None    Food insecurity:     Worry: None     Inability: None    Transportation needs:     Medical: None     Non-medical: None   Tobacco Use    Smoking status: Never Smoker    Smokeless tobacco: Never Used   Substance and Sexual Activity    Alcohol use: No    Drug use: No    Sexual activity: Yes     Partners: Male   Lifestyle    Physical activity:     Days per week: None     Minutes per session: None    Stress: None   Relationships    Social connections:     Talks on phone: None     Gets together: None     Attends Buddhism service: None     Active member of club or organization: None     Attends meetings of clubs or organizations: None     Relationship status: None    Intimate partner violence:     Fear of current or ex partner: None     Emotionally abused: None     Physically abused: None     Forced sexual activity: None   Other Topics Concern    None   Social History Narrative    None         Family History  family history includes Cancer in her maternal aunt and another family member; High Blood Pressure in her father and sister. Review of Systems:  Review of Systems   Constitutional: Negative for activity change. Gastrointestinal: Negative for constipation. Musculoskeletal: Positive for myalgias and neck pain. Negative for arthralgias and back pain. Neurological: Positive for headaches. Negative for weakness and numbness. Psychiatric/Behavioral: Negative for agitation, self-injury and suicidal ideas. The patient is not nervous/anxious.          14 point ROS negative besides that noted in HPI    Physical exam:     Patient Vitals for the past 24 hrs:   BP Temp Temp src Pulse Resp SpO2 Height Weight   05/02/19 1426 131/81 98.8 °F (37.1 °C) Oral 96 18 100 % 5' 3\" (1.6 m) 157 lb 2 oz (71.3 kg)       Body mass index is 27.83 kg/m². Physical Exam   Constitutional: She is oriented to person, place, and time. She appears well-developed and well-nourished. No distress. HENT:   Head: Normocephalic. Right Ear: External ear normal.   Left Ear: External ear normal.   Nose: Nose normal.   Eyes: Pupils are equal, round, and reactive to light. Conjunctivae and EOM are normal.   Neck: Normal range of motion. Neck supple. Cardiovascular: Normal rate and regular rhythm. Pulmonary/Chest: Effort normal and breath sounds normal.   Abdominal: Soft. Bowel sounds are normal. There is no hepatosplenomegaly. Musculoskeletal:        Cervical back: She exhibits tenderness, pain and spasm. Thoracic back: She exhibits tenderness, pain and spasm. Lumbar back: She exhibits tenderness, pain and spasm. Back:    Musculature is very taut with palpable knots that are tender to touch   Neurological: She is alert and oriented to person, place, and time. She has normal strength and normal reflexes. No cranial nerve deficit or sensory deficit. Skin: Skin is warm and dry. Psychiatric: She has a normal mood and affect. Her behavior is normal. Judgment and thought content normal.   Nursing note and vitals reviewed.       LAST UDS: 5/9/18 Compliant - UDS Today    LABS:     Lab Results   Component Value Date     08/27/2011    K 4.0 08/27/2011     08/27/2011    CO2 32 08/27/2011    BUN 10 08/27/2011    CREATININE 0.8 08/27/2011    GLUCOSE 94 08/27/2011    CALCIUM 9.3 08/27/2011        Lab Results   Component Value Date    WBC 6.87 08/27/2011    HGB 12.6 08/27/2011    HCT 38.0 08/27/2011    MCV 91.1 08/27/2011     08/27/2011       Recent NVC/EMG: N/A    Recent Imaging:  XR LUMBAR SPINE FLEXION AND EXTENSION ONLY  History: Chronic midline low back pain.  The lateral views of the lumbar spine are obtained in flexion and  extension. There is no previous study for comparison. There is normal curve and alignment. The vertebral body heights are  normal. A transitional vertebra S1 is noted. The heights of the  intervertebral disks are normal. No significant osteophytes. There is a very restricted translation of the lumbar spine during  flexion and extension. There is no change in alignment.     Impression  No acute bony abnormality. A very restricted translation of the lumbar spine during flexion and  extension. No instability. Signed by Dr Enrike Dong on 5/3/2018 10:12 AM    XR THORACIC SPINE (3 VIEWS)  History: Chronic midline mid back pain. Frontal and lateral views of the thoracic spine including the  cervicothoracic junction are obtained. There is no previous study for  comparison. There is no evidence of a fracture or compression or displacement. The curve and alignment are normal. The vertebral body heights are  normal. The posterior processes/pedicles are intact.     Impression  A negative study. Signed by Dr Enrike Dong on 5/3/2018 10:08 AM    XR CERVICAL SPINE FLEXION AND EXTENSION  History: The patient complains of severe migraine headache. She has a  history of Chiari type I malformation and had a previous surgery. The lateral views of the cervical spine are obtained in neutral,  flexion and extension position. There is no previous study for comparison. There is loss of cervical lordosis. The alignment is normal. The  vertebral body heights are normal.  There is a defect involving the occipital bone representing a previous  surgery/craniotomy. There is a satisfactory translation of the cervical spine during  flexion and extension. No change in alignment.     Impression  No acute bony abnormality. A satisfactory translation of the cervical spine during flexion and  extension. No instability.   Postsurgical changes/occipital craniotomy. Signed by Dr Brigida Grijalva on 5/3/2018 10:07 AM       Assessment:                                                                                                                                        Active Problems:    Chiari malformation type I (HCC)    Myofascial pain    Bilateral occipital neuralgia  Resolved Problems:    * No resolved hospital problems. *      PLAN:  1. Reorder Zanaflex, Fioricet, and ibuprofen. Medications and to pharmacy at the time of the appointment  2. Schedule patient for repeat cervical trigger points, thoracic trigger points, lumbar trigger points, and occipital nerve blocks as these have been very effective in treating patient's headaches and back pain. 3. Patient to follow-up post procedure or sooner if needed      Discussion: Discussed exam findings and plan of care with patient. Patient agreed with POC and questions were asked and answered. Activity: discussed exercise as beneficial to pain reduction, encouraged stretching exercise with a focus ontorso strengthening. Education Provided by provider:   Review of Fabiola Tawnya / Agreement Review / ORT Calculated / PHQ-9 Reviewed / Compliance Issues Discussed / Cognitive Behavior Needs / Exercise / Review of Test / Financial Issues / Teaching / Smoking Cessation / Tobacco/Alcohol Use     Controlled Substance Monitoring:  Discussed possible narcotic pain medication side effects, risk of tolerance and/or dependence, and alternative treatments. Discussed the effects of long term narcotic use. Patient encouraged to set daily goals of exercising and if on narcotics to decrease daily narcotic intake. CC:  CALI RAMIREZ Brain, APRN - CNP, 5/2/2019 at2:51 PM    EMR dragon/transcription disclaimer: Much of this encounter note is electronic transcription/translation of spoken language to printed tach.  Electronic translation of spoken language may be erroneous, or at times, nonsensical words or phrases may be inadvertently transcribed.  Although, I have reviewed the note for such errors, some may still exist.    Education Provided by Nurse  Review of Robert Starr / Agreement Review / ORT Calculated / PHQ-9 Reviewed / PEG calculated / Compliance Issues Discussed / Cognitive Behavior Needs / Exercise / Review of Test / Financial Issues / Teaching / Smoking Cessation / Tobacco/Alcohol Use Reviewed  Electronically signed by Dannielle Esteevz RN on 5/2/19 at 3:16 PM    Providers Plan   Outgoing Referral / Pharmacy Consult / Test ordered / Obtained Test Results / Consults    New/HP Patient Picture Obtained / Prescriptions ordered-3

## 2019-05-07 ASSESSMENT — ENCOUNTER SYMPTOMS
CONSTIPATION: 0
BACK PAIN: 0

## 2019-05-09 ENCOUNTER — TELEPHONE (OUTPATIENT)
Dept: PAIN MANAGEMENT | Age: 44
End: 2019-05-09

## 2019-05-22 ENCOUNTER — HOSPITAL ENCOUNTER (OUTPATIENT)
Dept: PAIN MANAGEMENT | Age: 44
Discharge: HOME | End: 2019-05-22
Payer: MEDICARE

## 2019-05-22 VITALS
OXYGEN SATURATION: 99 % | RESPIRATION RATE: 16 BRPM | DIASTOLIC BLOOD PRESSURE: 83 MMHG | SYSTOLIC BLOOD PRESSURE: 131 MMHG | HEART RATE: 77 BPM | TEMPERATURE: 97.4 F

## 2019-05-22 DIAGNOSIS — M79.18 MYOFASCIAL PAIN: ICD-10-CM

## 2019-05-22 PROCEDURE — 64405 NJX AA&/STRD GR OCPL NRV: CPT

## 2019-05-22 PROCEDURE — 20553 NJX 1/MLT TRIGGER POINTS 3/>: CPT

## 2019-05-22 PROCEDURE — 2500000003 HC RX 250 WO HCPCS

## 2019-05-22 PROCEDURE — 64405 NJX AA&/STRD GR OCPL NRV: CPT | Performed by: NURSE PRACTITIONER

## 2019-05-22 PROCEDURE — 6360000002 HC RX W HCPCS

## 2019-05-22 PROCEDURE — 20553 NJX 1/MLT TRIGGER POINTS 3/>: CPT | Performed by: NURSE PRACTITIONER

## 2019-05-22 RX ORDER — BUPIVACAINE HYDROCHLORIDE 5 MG/ML
10 INJECTION, SOLUTION EPIDURAL; INTRACAUDAL ONCE
Status: DISCONTINUED | OUTPATIENT
Start: 2019-05-22 | End: 2019-05-24 | Stop reason: HOSPADM

## 2019-05-22 RX ORDER — KETOROLAC TROMETHAMINE 30 MG/ML
30 INJECTION, SOLUTION INTRAMUSCULAR; INTRAVENOUS ONCE
Status: DISCONTINUED | OUTPATIENT
Start: 2019-05-22 | End: 2019-05-24 | Stop reason: HOSPADM

## 2019-05-22 RX ORDER — LIDOCAINE HYDROCHLORIDE 10 MG/ML
6 INJECTION, SOLUTION EPIDURAL; INFILTRATION; INTRACAUDAL; PERINEURAL ONCE
Status: DISCONTINUED | OUTPATIENT
Start: 2019-05-22 | End: 2019-05-24 | Stop reason: HOSPADM

## 2019-05-22 RX ORDER — DEXAMETHASONE SODIUM PHOSPHATE 10 MG/ML
20 INJECTION INTRAMUSCULAR; INTRAVENOUS ONCE
Status: DISCONTINUED | OUTPATIENT
Start: 2019-05-22 | End: 2019-05-24 | Stop reason: HOSPADM

## 2019-05-22 RX ORDER — LIDOCAINE HYDROCHLORIDE 10 MG/ML
10 INJECTION, SOLUTION EPIDURAL; INFILTRATION; INTRACAUDAL; PERINEURAL ONCE
Status: DISCONTINUED | OUTPATIENT
Start: 2019-05-22 | End: 2019-05-24 | Stop reason: HOSPADM

## 2019-05-22 NOTE — PROCEDURES
[facility] Physical & Pain Medicine    Patient Name: Jered Hernandez    : 1975    Age: 38 y.o. Sex: female    Date: May 22, 2019    Pre-op Diagnosis: Occipital Neuralgia, Chronic Headaches    Post-op Diagnosis: Occipital Neuralgia, Chronic Headaches    Procedure: Occipital Nerve Block of the  [] Right  [] Left  [x] Bilateral Occipital Nerve    Performing Procedure: Roula Flynn, ACAGNP - BC, [facility]    Previously Had Procedure:  [x] Yes   [] No    Patient Vitals for the past 24 hrs:   BP Temp Temp src Pulse Resp SpO2   19 1244 131/83 97.4 °F (36.3 °C) Temporal 77 16 99 %       Description of Procedure:    After a brief physical assessment and failure to improve with conservative measures the patient presented for a Occipital Nerve Block injection of the  [] Right   [] Left  [x] Bilateral Occipital Nerve. The indications, limitations and possible complications were discussed with the patient and the patient elected to proceed with the procedure. After voluntary, informed and signed consent obtained the patient was placed in a sitting position. Appropriate time out was obtained per policy. The external occipital protuberance, superior nuchal line and occipital artery were identified with palpation. The area of maximal tenderness was palpated over the  [] Right   [] Left   [x] Bilateral Occipital Nerve region and the skin overlying this area marked. The skin overlying the proposed injection site(s) were then prepped in a sterile fashion with Chloro-Prep. Under sterile technique a 25 gauge 1 1/2 inch needle was introduced into the [] Right   [] Left   [] Bilateral Occipital Nerve region. After a negative aspiration a solution of 2 ml of 1% Lidocaine Plain and 1 ml of Dexamethasone (10mg/ml) was injected and fanned out over the [] Right  [] Left  [] Bilateral Occipital Nerve distribution. Discharge: The patient tolerated the procedure well.  There were no complications during the procedure and the patient was discharged home with discharge instructions. The patient has been instructed to contact the office should there be any complications or questions to arise between today and their next appointment. Quintingiacomo AMNA Martin CNP, 5/22/2019 at 2:16 PM  Date: May 22, 2019    Pre-op Diagnosis: Myofascial Pain/ Muscle Spasms/ Cervicalgia    Post-op Diagnosis: Myofascial Pain/ Muscle Spasms/ Cervicalgia    Procedure: Cervical Trigger Point Injections    Performing Procedure: SANDRO Sharma, KOTA    Previously Had Procedure:  [x] Yes   [] No    Patient Vitals for the past 24 hrs:   BP Temp Temp src Pulse Resp SpO2   05/22/19 1244 131/83 97.4 °F (36.3 °C) Temporal 77 16 99 %       Description of Procedure:    After a brief physical assessment and failure to improve with conservative measures the patient presented for Cervical Trigger Point Injections The indications, limitations and possible complications were discussed with the patient and the patient elected to proceed with the procedure. After voluntary, informed and signed consent obtained the patient was placed in a seated position. Appropriate time out was obtained per policy. The area of maximal tenderness was palpated over the  [] Right  [] Left  [x] Bilateral Cervical  [x] Splenius  [x] Trapezius  [x] Rhomboid. The skin overlying these areas was marked with a skin marker. The skin overlying the proposed injection sites were then sprayed with Gebauer's Solution while protecting patient eyes. [] yes  [x] no    The areas were then prepped in a sterile fashion with Chloro-Prep.  Each trigger point of the  [] Right   [] Left  [x] Bilateral Cervical  [x] Splenius  [x] Trapezius  [x] Rhomboid was injected with approximately 2 ml of a solution of 5 ml of 1% Lidocaine Plain and 5 ml of 0.5% Marcaine Plain    [] with Decadron 0.5 ml (10mg/ml)   [x] with Toradol 0.5 ml (15mg/ml)  (approximately 20 ml total) using a 25 gauge 1 1/2 inch needle. Sterile dressings applied. Discharge: The patient tolerated the procedure well. There were no complications during the procedure and the patient was discharged home with discharge instructions. The patient has been instructed to contact the office should there be any complications or questions to arise between today and their next appointment.     Plan:  [x] Will return to the office in  4 - 6 weeks for Office Visit      AMNA Mota CNP, 5/22/2019 at 2:16 PM

## 2019-05-22 NOTE — PROGRESS NOTES
Procedure:  Level of Consciousness: [x]Alert [x]Oriented []Disoriented []Lethargic  Anxiety Level: [x]Calm []Anxious []Depressed []Other  Skin: [x]Warm []Dry []Cool []Moist []Intact []Other  Cardiovascular: []Palpitations: [x]Never []Occasionally []Frequently  Chest Pain: [x]No []Yes  Respiratory:  [x]Unlabored []Labored []Cough ([] Productive []Unproductive)  HCG Required: [x]No []Yes   Results: []Negative []Positive  Knowledge Level:        [x]Patient/Other verbalized understanding of pre-procedure instructions. [x]Assessment of post-op care needs (transportation, responsible caregiver)        [x]Able to discuss health care problems and how to deal with it.   Factors that Affect Teaching:        Language Barrier: [x]No []Yes - why:        Hearing Loss:        [x]No []Yes            Corrective Device:  []Yes []No        Vision Loss:           [x]No []Yes            Corrective Device:  []Yes []No        Memory Loss:       [x]No []Yes            []Short Term []Long Term  Motivational Level:  [x]Asks Questions                  []Extremely Anxious       [x]Seems Interested               []Seems Uninterested                  [x]Denies need for Education  Risk for Injury:  [x]Patient oriented to person, place and time  []History of frequent falls/loss of balance  Nutritional:  []Change in appetite   []Weight Gain   []Weight Loss  Functional:  []Requires assistance with ADL's

## 2019-05-24 ENCOUNTER — TELEPHONE (OUTPATIENT)
Dept: PAIN MANAGEMENT | Age: 44
End: 2019-05-24

## 2019-06-26 ENCOUNTER — HOSPITAL ENCOUNTER (OUTPATIENT)
Dept: PAIN MANAGEMENT | Age: 44
Discharge: HOME | End: 2019-06-26
Payer: MEDICARE

## 2019-06-26 VITALS
DIASTOLIC BLOOD PRESSURE: 68 MMHG | SYSTOLIC BLOOD PRESSURE: 107 MMHG | TEMPERATURE: 97.9 F | RESPIRATION RATE: 16 BRPM | OXYGEN SATURATION: 99 % | HEART RATE: 74 BPM

## 2019-06-26 DIAGNOSIS — M79.18 MYOFASCIAL PAIN: ICD-10-CM

## 2019-06-26 PROCEDURE — 2500000003 HC RX 250 WO HCPCS

## 2019-06-26 PROCEDURE — 20553 NJX 1/MLT TRIGGER POINTS 3/>: CPT

## 2019-06-26 PROCEDURE — 20553 NJX 1/MLT TRIGGER POINTS 3/>: CPT | Performed by: NURSE PRACTITIONER

## 2019-06-26 PROCEDURE — 6360000002 HC RX W HCPCS: Performed by: NURSE PRACTITIONER

## 2019-06-26 PROCEDURE — 6360000002 HC RX W HCPCS

## 2019-06-26 RX ORDER — KETOROLAC TROMETHAMINE 30 MG/ML
45 INJECTION, SOLUTION INTRAMUSCULAR; INTRAVENOUS ONCE
Status: DISCONTINUED | OUTPATIENT
Start: 2019-06-26 | End: 2019-06-28 | Stop reason: HOSPADM

## 2019-06-26 RX ORDER — BUPIVACAINE HYDROCHLORIDE 5 MG/ML
15 INJECTION, SOLUTION EPIDURAL; INTRACAUDAL ONCE
Status: DISCONTINUED | OUTPATIENT
Start: 2019-06-26 | End: 2019-06-28 | Stop reason: HOSPADM

## 2019-06-26 RX ORDER — LIDOCAINE HYDROCHLORIDE 10 MG/ML
15 INJECTION, SOLUTION EPIDURAL; INFILTRATION; INTRACAUDAL; PERINEURAL ONCE
Status: DISCONTINUED | OUTPATIENT
Start: 2019-06-26 | End: 2019-06-28 | Stop reason: HOSPADM

## 2019-06-26 NOTE — PROCEDURES
[facility]    Patient Name: Lindsay Schwarz    : 1975                    Age: 38 y.o. Sex: female    Date: 2019    Pre-op Diagnosis: Myofascial Pain/ Muscle Spasms    Post-op Diagnosis: Myofascial Pain/ Muscle Spasms    Procedure: Thoracic Trigger Point Injections    Performing Procedure: McClave Serum, ACAGNP - BC, VA-BC    Patient Vitals for the past 24 hrs:   BP Temp Temp src Pulse Resp SpO2   19 1317 107/68 97.9 °F (36.6 °C) Temporal 74 16 99 %       Description of Procedure:  After a brief physical assessment and failure to improve with conservative measures the patient presented for Thoracic Trigger Point Injections The indications, limitations and possible complications were discussed with the patient and the patient elected to proceed with the procedure. After voluntary, informed and signed consent obtained the patient was placed in a seated position. Appropriate time out was obtained per policy. The areas were then prepped in a sterile fashion with Chloro-Prep. The area of maximal tenderness was palpated over the bilaterally Thoracic Muscles - Erector Spinae, Upper/Mid Latissimus, Rhomboid Minor, Rhomboid Major. The skin overlying these areas was marked with a skin marker. The areas were prepped using aseptic technique with CHG prep. The skin overlying the proposed injection sites were then sprayed with Gebauer's Solution while protecting patient eyes.  Each trigger point of the Thoracic Muscles - Erector Spinae, Upper/Mid Latissimus, Rhomboid Minor, Rhomboid Major was injected after negative aspiration was injected with approximately 1-2 ml of a solution of 5 ml of 1% Lidocaine Plain and 5 ml of 0.5% Marcaine Plain after negative aspiration    Pre-op Diagnosis: Myofascial Pain/ Muscle Spasms    Post-op Diagnosis: Myofascial Pain/ Muscle Spasms    Procedure: Lumbar Trigger Point Injections    Performing Procedure: McClave Serum, ACAGNP - BC; VA-BC    Patient Vitals for the past 24 hrs:   BP Temp Temp src Pulse Resp SpO2   06/26/19 1317 107/68 97.9 °F (36.6 °C) Temporal 74 16 99 %       Description of Procedure:    After a brief physical assessment and failure to improve with conservative measures the patient presented for Lumbar Trigger Point Injections The indications, limitations and possible complications were discussed with the patient and the patient elected to proceed with the procedure. After voluntary, informed and signed consent obtained the patient was placed in a seated position. Appropriate time out was obtained per policy. The area of maximal tenderness was palpated over the bilaterally Lumbar Muscles - Lower Latissimus,  Erector Spinae, Lumbar Paraspinous. The skin overlying these areas was marked with a skin marker. The areas were prepped using aseptic technique with CHG prep. The skin overlying the proposed injection sites were then sprayed with Gebauer's Solution while protecting patient eyes. Each trigger point of the Lumbar Muscles - Lower Latissimus,  Erector Spinae, Lumbar Paraspinous was injected after negative aspiration was injected with approximately 1-2 ml of a solution of 5 ml of 1% Lidocaine Plain and 5 ml of 0.5% Marcaine Plain after negative aspiration    Toradol 0.5 ml (30mg/ml) added to trigger point injection. Discharge: The patient tolerated the procedure well. There were no complications during the procedure and the patient was discharged home with discharge instructions. The patient has been instructed to contact the office should there be any complications or questions to arise between today and their next appointment. Plan:     Will return to the office in 6 weeks for follow up    AMNA Gipson CNP, 6/26/2019 at 1:42 PM

## 2019-06-28 ENCOUNTER — TELEPHONE (OUTPATIENT)
Dept: PAIN MANAGEMENT | Age: 44
End: 2019-06-28

## 2019-06-28 DIAGNOSIS — M79.18 MYOFASCIAL PAIN: ICD-10-CM

## 2019-08-02 ENCOUNTER — OFFICE VISIT (OUTPATIENT)
Dept: NEUROSURGERY | Age: 44
End: 2019-08-02
Payer: MEDICARE

## 2019-08-02 VITALS
BODY MASS INDEX: 27.14 KG/M2 | DIASTOLIC BLOOD PRESSURE: 82 MMHG | HEART RATE: 92 BPM | WEIGHT: 159 LBS | SYSTOLIC BLOOD PRESSURE: 125 MMHG | HEIGHT: 64 IN

## 2019-08-02 DIAGNOSIS — Z86.69 HISTORY OF CHIARI MALFORMATION: ICD-10-CM

## 2019-08-02 DIAGNOSIS — G43.009 MIGRAINE WITHOUT AURA AND WITHOUT STATUS MIGRAINOSUS, NOT INTRACTABLE: Primary | ICD-10-CM

## 2019-08-02 PROCEDURE — 99213 OFFICE O/P EST LOW 20 MIN: CPT | Performed by: NURSE PRACTITIONER

## 2019-08-02 PROCEDURE — G8427 DOCREV CUR MEDS BY ELIG CLIN: HCPCS | Performed by: NURSE PRACTITIONER

## 2019-08-02 PROCEDURE — G8419 CALC BMI OUT NRM PARAM NOF/U: HCPCS | Performed by: NURSE PRACTITIONER

## 2019-08-02 PROCEDURE — 1036F TOBACCO NON-USER: CPT | Performed by: NURSE PRACTITIONER

## 2019-08-02 RX ORDER — METHYLPHENIDATE HYDROCHLORIDE 20 MG/1
TABLET ORAL
Refills: 0 | COMMUNITY
Start: 2019-07-26 | End: 2019-10-28

## 2019-08-02 NOTE — PROGRESS NOTES
Relation Age of Onset    High Blood Pressure Father     High Blood Pressure Sister     Cancer Maternal Aunt         breast    Cancer Other         maternal great aunt-breast       Social History     Socioeconomic History    Marital status:      Spouse name: Not on file    Number of children: Not on file    Years of education: Not on file    Highest education level: Not on file   Occupational History    Not on file   Social Needs    Financial resource strain: Not on file    Food insecurity:     Worry: Not on file     Inability: Not on file    Transportation needs:     Medical: Not on file     Non-medical: Not on file   Tobacco Use    Smoking status: Never Smoker    Smokeless tobacco: Never Used   Substance and Sexual Activity    Alcohol use: No    Drug use: No    Sexual activity: Yes     Partners: Male   Lifestyle    Physical activity:     Days per week: Not on file     Minutes per session: Not on file    Stress: Not on file   Relationships    Social connections:     Talks on phone: Not on file     Gets together: Not on file     Attends [employer] service: Not on file     Active member of club or organization: Not on file     Attends meetings of clubs or organizations: Not on file     Relationship status: Not on file    Intimate partner violence:     Fear of current or ex partner: Not on file     Emotionally abused: Not on file     Physically abused: Not on file     Forced sexual activity: Not on file   Other Topics Concern    Not on file   Social History Narrative    Not on file       Current Outpatient Medications   Medication Sig Dispense Refill    methylphenidate (RITALIN) 20 MG tablet TK 1 T PO BID  0    tiZANidine (ZANAFLEX) 4 MG tablet Take 1 tablet by mouth 3 times daily 90 tablet 2    butalbital-acetaminophen-caffeine (FIORICET, ESGIC) -40 MG per tablet Take 1 tablet by mouth 2 times daily as needed for Headaches or Migraine 30 tablet 2    ibuprofen (ADVIL;MOTRIN) 800 [] Sleep Apnea  [] Daytime Sleepiness  [x] Denies all unless marked  Skin:[] Rash [] Skin Discoloration [x] Denies all unless marked   Neurological: []Visual Disturbance/Memory Loss [] Loss of Balance [] Slurred Speech/Weakness [] Seizures  [] Vertigo/Dizziness [x] Denies all unless marked    The MA has completed the ROS with the patient. I have reviewed it in its' entirety with the patient and agree with the documentation. PHYSICAL EXAM  /82   Pulse 92   Ht 5' 4\" (1.626 m)   Wt 159 lb (72.1 kg)   BMI 27.29 kg/m²      Constitutional - No acute distress    HEENT- Conjunctiva normal.  No scars, masses, or lesions over external nose or ears, no neck masses noted, no jugular vein distension, no bruit  Musculoskeletal - No significant wasting of muscles noted, no bony deformities  Extremities - No clubbing, cyanosis or edema  Skin - Warm, dry, and intact. No rash, erythema, or pallor  Psychiatric - Mood, affect, and behavior appear normal      NEUROLOGICAL EXAM    Mental status   [x]Awake, alert, oriented   [x]Affect attention and concentration appear appropriate  [x]Recent and remote memory appears unremarkable  [x]Speech normal without dysarthria or aphasia, comprehension and repetition intact.    COMMENTS:    Cranial Nerves [x]No VF deficit to confrontation,  no papilledema on fundoscopic exam.  [x]PERRLA, EOMI, no nystagmus, conjugate eye movements, no ptosis  [x]Face symmetric  [x]Facial sensation intact  [x]Tongue midline no atrophy or fasciculations present  [x]Palate midline, hearing to finger rub normal bilaterally  [x]Shoulder shrug and SCM testing normal bilaterally  COMMENTS:   Motor   [x]5/5 strength x 4 extremities  [x]Normal bulk and tone  [x]No tremor present  [x]No rigidity or bradykinesia noted  COMMENTS:   Sensory  [x]Sensation intact to light touch, pin prick, vibration, and proprioception BLE  [x]Sensation intact to light touch, pin prick, vibration, and proprioception BUE  COMMENTS:

## 2019-08-08 ENCOUNTER — HOSPITAL ENCOUNTER (OUTPATIENT)
Dept: PAIN MANAGEMENT | Age: 44
Discharge: HOME | End: 2019-08-08
Payer: MEDICARE

## 2019-08-08 VITALS
HEART RATE: 83 BPM | WEIGHT: 166 LBS | RESPIRATION RATE: 16 BRPM | TEMPERATURE: 98.3 F | SYSTOLIC BLOOD PRESSURE: 112 MMHG | BODY MASS INDEX: 28.34 KG/M2 | DIASTOLIC BLOOD PRESSURE: 75 MMHG | HEIGHT: 64 IN

## 2019-08-08 DIAGNOSIS — Z86.19 HISTORY OF SHINGLES: Primary | ICD-10-CM

## 2019-08-08 DIAGNOSIS — M79.18 MYOFASCIAL PAIN: ICD-10-CM

## 2019-08-08 DIAGNOSIS — G43.119 INTRACTABLE MIGRAINE WITH AURA WITHOUT STATUS MIGRAINOSUS: ICD-10-CM

## 2019-08-08 PROCEDURE — 99214 OFFICE O/P EST MOD 30 MIN: CPT | Performed by: NURSE PRACTITIONER

## 2019-08-08 PROCEDURE — 99213 OFFICE O/P EST LOW 20 MIN: CPT

## 2019-08-08 RX ORDER — LIDOCAINE 50 MG/G
3 PATCH TOPICAL DAILY
Qty: 30 PATCH | Refills: 2 | Status: SHIPPED | OUTPATIENT
Start: 2019-08-08 | End: 2021-12-28 | Stop reason: SDUPTHER

## 2019-08-08 RX ORDER — BUTALBITAL, ACETAMINOPHEN AND CAFFEINE 50; 325; 40 MG/1; MG/1; MG/1
1 TABLET ORAL 2 TIMES DAILY PRN
Qty: 30 TABLET | Refills: 2 | Status: SHIPPED | OUTPATIENT
Start: 2019-08-08 | End: 2019-10-28 | Stop reason: SDUPTHER

## 2019-08-08 RX ORDER — HYDROCODONE BITARTRATE AND ACETAMINOPHEN 5; 325 MG/1; MG/1
1 TABLET ORAL EVERY 8 HOURS PRN
Qty: 60 TABLET | Refills: 0 | Status: SHIPPED | OUTPATIENT
Start: 2019-08-08 | End: 2019-09-16 | Stop reason: SDUPTHER

## 2019-08-08 RX ORDER — IBUPROFEN 800 MG/1
800 TABLET ORAL 3 TIMES DAILY PRN
Qty: 90 TABLET | Refills: 5 | Status: SHIPPED | OUTPATIENT
Start: 2019-08-08 | End: 2019-10-28 | Stop reason: SDUPTHER

## 2019-08-08 ASSESSMENT — PAIN DESCRIPTION - PAIN TYPE: TYPE: CHRONIC PAIN

## 2019-08-08 ASSESSMENT — PAIN DESCRIPTION - LOCATION: LOCATION: BACK

## 2019-08-08 ASSESSMENT — PAIN SCALES - GENERAL: PAINLEVEL_OUTOF10: 4

## 2019-08-15 ENCOUNTER — TELEPHONE (OUTPATIENT)
Dept: PAIN MANAGEMENT | Age: 44
End: 2019-08-15

## 2019-08-20 ASSESSMENT — ENCOUNTER SYMPTOMS
BACK PAIN: 1
NAUSEA: 1
PHOTOPHOBIA: 1
CONSTIPATION: 0

## 2019-08-28 ENCOUNTER — HOSPITAL ENCOUNTER (OUTPATIENT)
Dept: WOMENS IMAGING | Age: 44
Discharge: HOME | End: 2019-08-28
Payer: MEDICARE

## 2019-08-28 ENCOUNTER — HOSPITAL ENCOUNTER (OUTPATIENT)
Dept: PAIN MANAGEMENT | Age: 44
Discharge: HOME | End: 2019-08-28
Payer: MEDICARE

## 2019-08-28 VITALS
TEMPERATURE: 97.8 F | RESPIRATION RATE: 16 BRPM | DIASTOLIC BLOOD PRESSURE: 75 MMHG | SYSTOLIC BLOOD PRESSURE: 118 MMHG | HEART RATE: 71 BPM | OXYGEN SATURATION: 100 %

## 2019-08-28 DIAGNOSIS — N63.21 LUMP IN UPPER OUTER QUADRANT OF LEFT BREAST: ICD-10-CM

## 2019-08-28 DIAGNOSIS — N63.0 BREAST LUMP: ICD-10-CM

## 2019-08-28 DIAGNOSIS — M79.18 MYOFASCIAL PAIN: ICD-10-CM

## 2019-08-28 PROCEDURE — 64615 CHEMODENERV MUSC MIGRAINE: CPT

## 2019-08-28 PROCEDURE — 64615 CHEMODENERV MUSC MIGRAINE: CPT | Performed by: NURSE PRACTITIONER

## 2019-08-28 PROCEDURE — 6360000002 HC RX W HCPCS

## 2019-08-28 PROCEDURE — G0279 TOMOSYNTHESIS, MAMMO: HCPCS

## 2019-08-28 PROCEDURE — 76642 ULTRASOUND BREAST LIMITED: CPT

## 2019-08-28 NOTE — PROGRESS NOTES
Procedure:  Level of Consciousness: [x]Alert [x]Oriented []Disoriented []Lethargic  Anxiety Level: [x]Calm []Anxious []Depressed []Other  Skin: [x]Warm [x]Dry []Cool []Moist []Intact []Other  Cardiovascular: []Palpitations: [x]Never []Occasionally []Frequently  Chest Pain: [x]No []Yes  Respiratory:  [x]Unlabored []Labored []Cough ([] Productive []Unproductive)  HCG Required: [x]No []Yes   Results: []Negative []Positive  Knowledge Level:        [x]Patient/Other verbalized understanding of pre-procedure instructions. [x]Assessment of post-op care needs (transportation, responsible caregiver)        [x]Able to discuss health care problems and how to deal with it.   Factors that Affect Teaching:        Language Barrier: [x]No []Yes - why:        Hearing Loss:        [x]No []Yes            Corrective Device:  []Yes []No        Vision Loss:           [x]No []Yes            Corrective Device:  []Yes []No        Memory Loss:       [x]No []Yes            []Short Term []Long Term  Motivational Level:  [x]Asks Questions                  []Extremely Anxious       [x]Seems Interested               []Seems Uninterested                  [x]Denies need for Education  Risk for Injury:  []Patient oriented to person, place and time  []History of frequent falls/loss of balance  Nutritional:  []Change in appetite   []Weight Gain   []Weight Loss  Functional:  []Requires assistance with ADL's

## 2019-08-30 ENCOUNTER — TELEPHONE (OUTPATIENT)
Dept: PAIN MANAGEMENT | Age: 44
End: 2019-08-30

## 2019-08-30 DIAGNOSIS — M79.18 MYOFASCIAL PAIN: ICD-10-CM

## 2019-09-11 ENCOUNTER — HOSPITAL ENCOUNTER (OUTPATIENT)
Dept: PAIN MANAGEMENT | Age: 44
Discharge: HOME | End: 2019-09-11
Payer: MEDICARE

## 2019-09-11 VITALS
SYSTOLIC BLOOD PRESSURE: 138 MMHG | OXYGEN SATURATION: 100 % | RESPIRATION RATE: 16 BRPM | DIASTOLIC BLOOD PRESSURE: 84 MMHG | HEART RATE: 98 BPM | TEMPERATURE: 97.6 F

## 2019-09-11 DIAGNOSIS — M79.18 MYOFASCIAL PAIN: ICD-10-CM

## 2019-09-11 PROCEDURE — 2500000003 HC RX 250 WO HCPCS

## 2019-09-11 PROCEDURE — 20553 NJX 1/MLT TRIGGER POINTS 3/>: CPT | Performed by: NURSE PRACTITIONER

## 2019-09-11 PROCEDURE — 20553 NJX 1/MLT TRIGGER POINTS 3/>: CPT

## 2019-09-11 RX ORDER — BUPIVACAINE HYDROCHLORIDE 5 MG/ML
15 INJECTION, SOLUTION EPIDURAL; INTRACAUDAL ONCE
Status: DISCONTINUED | OUTPATIENT
Start: 2019-09-11 | End: 2019-09-13 | Stop reason: HOSPADM

## 2019-09-11 RX ORDER — LIDOCAINE HYDROCHLORIDE 10 MG/ML
15 INJECTION, SOLUTION EPIDURAL; INFILTRATION; INTRACAUDAL; PERINEURAL ONCE
Status: DISCONTINUED | OUTPATIENT
Start: 2019-09-11 | End: 2019-09-13 | Stop reason: HOSPADM

## 2019-09-12 RX ORDER — ERENUMAB-AOOE 140 MG/ML
INJECTION, SOLUTION SUBCUTANEOUS
Qty: 1 PEN | Refills: 5 | Status: SHIPPED | OUTPATIENT
Start: 2019-09-12 | End: 2019-11-07 | Stop reason: SDUPTHER

## 2019-09-13 ENCOUNTER — TELEPHONE (OUTPATIENT)
Dept: PAIN MANAGEMENT | Age: 44
End: 2019-09-13

## 2019-09-13 DIAGNOSIS — M79.18 MYOFASCIAL PAIN: ICD-10-CM

## 2019-09-13 NOTE — TELEPHONE ENCOUNTER
Spoke with pt concerning her injections she had on 09/11. Pt states she is more sore than usual and is having more trouble under her right shoulder blade. Reminded pt to use ice for soreness and achy feelings. Pt states she is doing the ice, but may need pain medication for relief. Pt has more injections scheduled for 09/25.

## 2019-09-16 DIAGNOSIS — G43.119 INTRACTABLE MIGRAINE WITH AURA WITHOUT STATUS MIGRAINOSUS: ICD-10-CM

## 2019-09-19 RX ORDER — HYDROCODONE BITARTRATE AND ACETAMINOPHEN 5; 325 MG/1; MG/1
1 TABLET ORAL EVERY 8 HOURS PRN
Qty: 60 TABLET | Refills: 0 | Status: SHIPPED | OUTPATIENT
Start: 2019-09-19 | End: 2019-10-28 | Stop reason: SDUPTHER

## 2019-09-25 ENCOUNTER — HOSPITAL ENCOUNTER (OUTPATIENT)
Dept: PAIN MANAGEMENT | Age: 44
Discharge: HOME | End: 2019-09-25
Payer: MEDICARE

## 2019-09-25 VITALS
OXYGEN SATURATION: 100 % | SYSTOLIC BLOOD PRESSURE: 122 MMHG | DIASTOLIC BLOOD PRESSURE: 71 MMHG | HEART RATE: 85 BPM | RESPIRATION RATE: 16 BRPM | TEMPERATURE: 98.4 F

## 2019-09-25 DIAGNOSIS — M79.18 MYOFASCIAL PAIN: ICD-10-CM

## 2019-09-25 PROCEDURE — 2500000003 HC RX 250 WO HCPCS

## 2019-09-25 PROCEDURE — 20553 NJX 1/MLT TRIGGER POINTS 3/>: CPT

## 2019-09-25 PROCEDURE — 20553 NJX 1/MLT TRIGGER POINTS 3/>: CPT | Performed by: NURSE PRACTITIONER

## 2019-09-25 RX ORDER — LIDOCAINE HYDROCHLORIDE 10 MG/ML
10 INJECTION, SOLUTION EPIDURAL; INFILTRATION; INTRACAUDAL; PERINEURAL ONCE
Status: DISCONTINUED | OUTPATIENT
Start: 2019-09-25 | End: 2019-09-27 | Stop reason: HOSPADM

## 2019-09-25 RX ORDER — BUPIVACAINE HYDROCHLORIDE 5 MG/ML
10 INJECTION, SOLUTION EPIDURAL; INTRACAUDAL ONCE
Status: DISCONTINUED | OUTPATIENT
Start: 2019-09-25 | End: 2019-09-27 | Stop reason: HOSPADM

## 2019-09-25 ASSESSMENT — PAIN SCALES - GENERAL: PAINLEVEL_OUTOF10: 6

## 2019-09-25 NOTE — PROGRESS NOTES
Procedure:  Level of Consciousness: [x]Alert []Oriented []Disoriented []Lethargic  Anxiety Level: [x]Calm []Anxious []Depressed []Other  Skin: [x]Warm [x]Dry []Cool []Moist []Intact []Other  Cardiovascular: []Palpitations: [x]Never []Occasionally []Frequently  Chest Pain: [x]No []Yes  Respiratory:  [x]Unlabored []Labored []Cough ([] Productive []Unproductive)  HCG Required: [x]No []Yes   Results: []Negative []Positive  Knowledge Level:        [x]Patient/Other verbalized understanding of pre-procedure instructions. [x]Assessment of post-op care needs (transportation, responsible caregiver)        [x]Able to discuss health care problems and how to deal with it.   Factors that Affect Teaching:        Language Barrier: [x]No []Yes - why:        Hearing Loss:        [x]No []Yes            Corrective Device:  []Yes []No        Vision Loss:           [x]No []Yes            Corrective Device:  []Yes []No        Memory Loss:       [x]No []Yes            []Short Term []Long Term  Motivational Level:  []Asks Questions                  []Extremely Anxious       []Seems Interested               []Seems Uninterested                  []Denies need for Education  Risk for Injury:  []Patient oriented to person, place and time  []History of frequent falls/loss of balance  Nutritional:  []Change in appetite   []Weight Gain   []Weight Loss  Functional:  []Requires assistance with ADL's      [facility]  Post Procedure Discharge Instructions        YOU HAVE HAD THE FOLLOWING PROCEDURE:                                  [] Occipital Nerve Blocks  [] CTS wrist injection(s)  [] Knee Injection(s)         [] Shoulder Injection(s)   [] Elbow Injection(s)     [] Botox Injection  [] Cervical Trigger Point Injections    [] Thoracic Trigger Point Injections    [x] Lumbar Trigger Point Injections  [] Piriformis Trigger Point Injections  [] SI Joint Injection(s)     [] Trochanteric Bursa Injection(s)

## 2019-09-27 ENCOUNTER — TELEPHONE (OUTPATIENT)
Dept: PAIN MANAGEMENT | Age: 44
End: 2019-09-27

## 2019-09-27 DIAGNOSIS — M79.18 MYOFASCIAL PAIN: ICD-10-CM

## 2019-10-28 ENCOUNTER — HOSPITAL ENCOUNTER (OUTPATIENT)
Dept: PAIN MANAGEMENT | Age: 44
Discharge: HOME | End: 2019-10-28
Payer: MEDICARE

## 2019-10-28 ENCOUNTER — TELEPHONE (OUTPATIENT)
Dept: NEUROSURGERY | Age: 44
End: 2019-10-28

## 2019-10-28 VITALS
WEIGHT: 159.5 LBS | OXYGEN SATURATION: 100 % | DIASTOLIC BLOOD PRESSURE: 78 MMHG | HEIGHT: 64 IN | SYSTOLIC BLOOD PRESSURE: 114 MMHG | BODY MASS INDEX: 27.23 KG/M2 | HEART RATE: 87 BPM | TEMPERATURE: 98.3 F | RESPIRATION RATE: 18 BRPM

## 2019-10-28 DIAGNOSIS — M79.18 MYOFASCIAL PAIN: ICD-10-CM

## 2019-10-28 DIAGNOSIS — G43.009 MIGRAINE WITHOUT AURA AND WITHOUT STATUS MIGRAINOSUS, NOT INTRACTABLE: ICD-10-CM

## 2019-10-28 DIAGNOSIS — G43.119 INTRACTABLE MIGRAINE WITH AURA WITHOUT STATUS MIGRAINOSUS: ICD-10-CM

## 2019-10-28 DIAGNOSIS — G93.5 CHIARI MALFORMATION TYPE I (HCC): Primary | ICD-10-CM

## 2019-10-28 DIAGNOSIS — G43.019 INTRACTABLE MIGRAINE WITHOUT AURA AND WITHOUT STATUS MIGRAINOSUS: ICD-10-CM

## 2019-10-28 PROCEDURE — 99214 OFFICE O/P EST MOD 30 MIN: CPT | Performed by: NURSE PRACTITIONER

## 2019-10-28 PROCEDURE — 99213 OFFICE O/P EST LOW 20 MIN: CPT

## 2019-10-28 RX ORDER — BUTALBITAL, ACETAMINOPHEN AND CAFFEINE 50; 325; 40 MG/1; MG/1; MG/1
1 TABLET ORAL 2 TIMES DAILY PRN
Qty: 30 TABLET | Refills: 2 | Status: SHIPPED | OUTPATIENT
Start: 2019-10-28 | End: 2020-01-08 | Stop reason: SDUPTHER

## 2019-10-28 RX ORDER — LISDEXAMFETAMINE DIMESYLATE 30 MG/1
1 CAPSULE ORAL DAILY
COMMUNITY
Start: 2019-09-19 | End: 2022-04-28

## 2019-10-28 RX ORDER — HYDROCODONE BITARTRATE AND ACETAMINOPHEN 5; 325 MG/1; MG/1
1 TABLET ORAL EVERY 8 HOURS PRN
Qty: 60 TABLET | Refills: 0 | Status: SHIPPED | OUTPATIENT
Start: 2019-10-28 | End: 2019-12-04 | Stop reason: SDUPTHER

## 2019-10-28 RX ORDER — IBUPROFEN 800 MG/1
800 TABLET ORAL 3 TIMES DAILY PRN
Qty: 90 TABLET | Refills: 5 | Status: SHIPPED | OUTPATIENT
Start: 2019-10-28 | End: 2020-10-27 | Stop reason: SDUPTHER

## 2019-10-28 ASSESSMENT — ACTIVITIES OF DAILY LIVING (ADL): EFFECT OF PAIN ON DAILY ACTIVITIES: LIMITS ACTIVITY

## 2019-10-28 ASSESSMENT — ENCOUNTER SYMPTOMS
BACK PAIN: 1
CONSTIPATION: 0
NAUSEA: 1
PHOTOPHOBIA: 1

## 2019-10-28 ASSESSMENT — PAIN DESCRIPTION - ONSET: ONSET: ON-GOING

## 2019-10-28 ASSESSMENT — PAIN DESCRIPTION - DIRECTION: RADIATING_TOWARDS: HURTS ALL OVER

## 2019-10-28 ASSESSMENT — PAIN DESCRIPTION - FREQUENCY: FREQUENCY: CONTINUOUS

## 2019-10-28 ASSESSMENT — PAIN SCALES - GENERAL: PAINLEVEL_OUTOF10: 8

## 2019-10-28 ASSESSMENT — PAIN DESCRIPTION - LOCATION: LOCATION: BACK;NECK;HEAD

## 2019-10-28 ASSESSMENT — PAIN DESCRIPTION - PAIN TYPE: TYPE: CHRONIC PAIN

## 2019-10-28 ASSESSMENT — PAIN DESCRIPTION - PROGRESSION: CLINICAL_PROGRESSION: NOT CHANGED

## 2019-10-28 ASSESSMENT — PAIN - FUNCTIONAL ASSESSMENT: PAIN_FUNCTIONAL_ASSESSMENT: PREVENTS OR INTERFERES SOME ACTIVE ACTIVITIES AND ADLS

## 2019-10-28 ASSESSMENT — PAIN DESCRIPTION - DESCRIPTORS: DESCRIPTORS: ACHING;CONSTANT;THROBBING

## 2019-10-28 ASSESSMENT — PAIN DESCRIPTION - ORIENTATION: ORIENTATION: LOWER;UPPER;MID

## 2019-11-06 ENCOUNTER — HOSPITAL ENCOUNTER (OUTPATIENT)
Dept: MRI IMAGING | Age: 44
Discharge: HOME | End: 2019-11-06
Payer: MEDICARE

## 2019-11-06 DIAGNOSIS — G43.019 INTRACTABLE MIGRAINE WITHOUT AURA AND WITHOUT STATUS MIGRAINOSUS: ICD-10-CM

## 2019-11-06 DIAGNOSIS — G93.5 CHIARI MALFORMATION TYPE I (HCC): ICD-10-CM

## 2019-11-06 PROCEDURE — 6360000004 HC RX CONTRAST MEDICATION: Performed by: NURSE PRACTITIONER

## 2019-11-06 PROCEDURE — A9577 INJ MULTIHANCE: HCPCS | Performed by: NURSE PRACTITIONER

## 2019-11-06 PROCEDURE — 70553 MRI BRAIN STEM W/O & W/DYE: CPT

## 2019-11-06 RX ADMIN — GADOBENATE DIMEGLUMINE 15 ML: 529 INJECTION, SOLUTION INTRAVENOUS at 15:59

## 2019-11-07 ENCOUNTER — OFFICE VISIT (OUTPATIENT)
Dept: NEUROSURGERY | Age: 44
End: 2019-11-07
Payer: MEDICARE

## 2019-11-07 VITALS
HEART RATE: 75 BPM | WEIGHT: 159.8 LBS | BODY MASS INDEX: 27.28 KG/M2 | DIASTOLIC BLOOD PRESSURE: 73 MMHG | OXYGEN SATURATION: 99 % | SYSTOLIC BLOOD PRESSURE: 112 MMHG | HEIGHT: 64 IN

## 2019-11-07 DIAGNOSIS — G43.009 MIGRAINE WITHOUT AURA AND WITHOUT STATUS MIGRAINOSUS, NOT INTRACTABLE: Primary | ICD-10-CM

## 2019-11-07 DIAGNOSIS — M54.2 NECK PAIN: ICD-10-CM

## 2019-11-07 DIAGNOSIS — Z86.69 HISTORY OF CHIARI MALFORMATION: ICD-10-CM

## 2019-11-07 PROCEDURE — 1036F TOBACCO NON-USER: CPT | Performed by: NURSE PRACTITIONER

## 2019-11-07 PROCEDURE — G8484 FLU IMMUNIZE NO ADMIN: HCPCS | Performed by: NURSE PRACTITIONER

## 2019-11-07 PROCEDURE — G8419 CALC BMI OUT NRM PARAM NOF/U: HCPCS | Performed by: NURSE PRACTITIONER

## 2019-11-07 PROCEDURE — G8427 DOCREV CUR MEDS BY ELIG CLIN: HCPCS | Performed by: NURSE PRACTITIONER

## 2019-11-07 PROCEDURE — 99213 OFFICE O/P EST LOW 20 MIN: CPT | Performed by: NURSE PRACTITIONER

## 2019-11-07 RX ORDER — KETOROLAC TROMETHAMINE 15.75 MG/1
1 SPRAY, METERED NASAL
Qty: 1 EACH | Refills: 3 | Status: SHIPPED | OUTPATIENT
Start: 2019-11-07 | End: 2020-03-04

## 2019-11-15 ENCOUNTER — HOSPITAL ENCOUNTER (OUTPATIENT)
Dept: MRI IMAGING | Age: 44
Discharge: HOME | End: 2019-11-15
Payer: MEDICARE

## 2019-11-15 DIAGNOSIS — M54.2 NECK PAIN: ICD-10-CM

## 2019-11-15 DIAGNOSIS — G43.009 MIGRAINE WITHOUT AURA AND WITHOUT STATUS MIGRAINOSUS, NOT INTRACTABLE: ICD-10-CM

## 2019-11-15 PROCEDURE — 6360000004 HC RX CONTRAST MEDICATION: Performed by: NURSE PRACTITIONER

## 2019-11-15 PROCEDURE — 72156 MRI NECK SPINE W/O & W/DYE: CPT

## 2019-11-15 PROCEDURE — A9577 INJ MULTIHANCE: HCPCS | Performed by: NURSE PRACTITIONER

## 2019-11-15 RX ADMIN — GADOBENATE DIMEGLUMINE 14 ML: 529 INJECTION, SOLUTION INTRAVENOUS at 16:31

## 2019-11-20 ENCOUNTER — HOSPITAL ENCOUNTER (OUTPATIENT)
Dept: PAIN MANAGEMENT | Age: 44
Discharge: HOME | End: 2019-11-20
Payer: MEDICARE

## 2019-11-20 VITALS
OXYGEN SATURATION: 100 % | SYSTOLIC BLOOD PRESSURE: 124 MMHG | TEMPERATURE: 98.1 F | DIASTOLIC BLOOD PRESSURE: 65 MMHG | RESPIRATION RATE: 18 BRPM | HEART RATE: 77 BPM

## 2019-11-20 PROCEDURE — 20553 NJX 1/MLT TRIGGER POINTS 3/>: CPT | Performed by: NURSE PRACTITIONER

## 2019-11-20 PROCEDURE — 20553 NJX 1/MLT TRIGGER POINTS 3/>: CPT

## 2019-11-20 PROCEDURE — 64405 NJX AA&/STRD GR OCPL NRV: CPT

## 2019-11-20 PROCEDURE — 2500000003 HC RX 250 WO HCPCS

## 2019-11-20 PROCEDURE — 6360000002 HC RX W HCPCS

## 2019-11-20 PROCEDURE — 64405 NJX AA&/STRD GR OCPL NRV: CPT | Performed by: NURSE PRACTITIONER

## 2019-11-20 RX ORDER — BUPIVACAINE HYDROCHLORIDE 5 MG/ML
10 INJECTION, SOLUTION EPIDURAL; INTRACAUDAL ONCE
Status: DISCONTINUED | OUTPATIENT
Start: 2019-11-20 | End: 2019-11-22 | Stop reason: HOSPADM

## 2019-11-20 RX ORDER — LIDOCAINE HYDROCHLORIDE 10 MG/ML
10 INJECTION, SOLUTION EPIDURAL; INFILTRATION; INTRACAUDAL; PERINEURAL ONCE
Status: DISCONTINUED | OUTPATIENT
Start: 2019-11-20 | End: 2019-11-22 | Stop reason: HOSPADM

## 2019-11-20 RX ORDER — DEXAMETHASONE SODIUM PHOSPHATE 10 MG/ML
20 INJECTION, SOLUTION INTRAMUSCULAR; INTRAVENOUS ONCE
Status: DISCONTINUED | OUTPATIENT
Start: 2019-11-20 | End: 2019-11-22 | Stop reason: HOSPADM

## 2019-11-20 RX ORDER — LIDOCAINE HYDROCHLORIDE 10 MG/ML
6 INJECTION, SOLUTION EPIDURAL; INFILTRATION; INTRACAUDAL; PERINEURAL ONCE
Status: DISCONTINUED | OUTPATIENT
Start: 2019-11-20 | End: 2019-11-22 | Stop reason: HOSPADM

## 2019-11-22 ENCOUNTER — TELEPHONE (OUTPATIENT)
Dept: PAIN MANAGEMENT | Age: 44
End: 2019-11-22

## 2019-11-22 DIAGNOSIS — M79.18 MYOFASCIAL PAIN: ICD-10-CM

## 2019-12-04 ENCOUNTER — HOSPITAL ENCOUNTER (OUTPATIENT)
Dept: PAIN MANAGEMENT | Age: 44
Discharge: HOME | End: 2019-12-04
Payer: MEDICARE

## 2019-12-04 VITALS
DIASTOLIC BLOOD PRESSURE: 68 MMHG | SYSTOLIC BLOOD PRESSURE: 125 MMHG | OXYGEN SATURATION: 100 % | TEMPERATURE: 98 F | HEART RATE: 79 BPM | RESPIRATION RATE: 16 BRPM

## 2019-12-04 DIAGNOSIS — M79.18 MYOFASCIAL PAIN: ICD-10-CM

## 2019-12-04 DIAGNOSIS — G43.119 INTRACTABLE MIGRAINE WITH AURA WITHOUT STATUS MIGRAINOSUS: ICD-10-CM

## 2019-12-04 PROCEDURE — 6360000002 HC RX W HCPCS

## 2019-12-04 PROCEDURE — 64615 CHEMODENERV MUSC MIGRAINE: CPT

## 2019-12-04 PROCEDURE — 64615 CHEMODENERV MUSC MIGRAINE: CPT | Performed by: NURSE PRACTITIONER

## 2019-12-04 RX ORDER — HYDROCODONE BITARTRATE AND ACETAMINOPHEN 5; 325 MG/1; MG/1
1 TABLET ORAL EVERY 8 HOURS PRN
Qty: 60 TABLET | Refills: 0 | Status: SHIPPED | OUTPATIENT
Start: 2019-12-04 | End: 2020-01-07 | Stop reason: SDUPTHER

## 2019-12-10 ENCOUNTER — TELEPHONE (OUTPATIENT)
Dept: PAIN MANAGEMENT | Age: 44
End: 2019-12-10

## 2020-01-08 ENCOUNTER — TELEPHONE (OUTPATIENT)
Dept: NEUROSURGERY | Age: 45
End: 2020-01-08

## 2020-01-08 RX ORDER — HYDROCODONE BITARTRATE AND ACETAMINOPHEN 5; 325 MG/1; MG/1
1 TABLET ORAL EVERY 8 HOURS PRN
Qty: 60 TABLET | Refills: 0 | Status: SHIPPED | OUTPATIENT
Start: 2020-01-08 | End: 2020-02-17 | Stop reason: SDUPTHER

## 2020-01-08 RX ORDER — BUTALBITAL, ACETAMINOPHEN AND CAFFEINE 50; 325; 40 MG/1; MG/1; MG/1
1 TABLET ORAL 2 TIMES DAILY PRN
Qty: 30 TABLET | Refills: 2 | Status: SHIPPED | OUTPATIENT
Start: 2020-01-08 | End: 2020-06-22 | Stop reason: SDUPTHER

## 2020-01-08 NOTE — TELEPHONE ENCOUNTER
Karen Will (be2)   Rx #: [id number]   Aimovig 140MG/ML auto-injectors   Form  [employer] Part D Electronic Prior Authorization Form (2017 [place])   Created   12 minutes ago   Sent to Plan   9 minutes ago   Plan Response   8 minutes ago   Submit Clinical Questions   less than a minute ago   Determination   Wait for Determination  Please wait for [employer] 2017 [place]CP[employer] to return a determination.

## 2020-01-13 ENCOUNTER — HOSPITAL ENCOUNTER (OUTPATIENT)
Dept: PAIN MANAGEMENT | Age: 45
Discharge: HOME | End: 2020-01-13
Payer: MEDICARE

## 2020-01-13 VITALS
TEMPERATURE: 44.6 F | OXYGEN SATURATION: 100 % | HEIGHT: 63 IN | DIASTOLIC BLOOD PRESSURE: 75 MMHG | WEIGHT: 160.38 LBS | BODY MASS INDEX: 28.42 KG/M2 | RESPIRATION RATE: 18 BRPM | SYSTOLIC BLOOD PRESSURE: 114 MMHG | HEART RATE: 80 BPM

## 2020-01-13 PROBLEM — M53.3 SI (SACROILIAC) JOINT DYSFUNCTION: Status: ACTIVE | Noted: 2020-01-13

## 2020-01-13 PROBLEM — M54.50 CHRONIC BILATERAL LOW BACK PAIN WITHOUT SCIATICA: Status: ACTIVE | Noted: 2020-01-13

## 2020-01-13 PROBLEM — G89.29 CHRONIC BILATERAL LOW BACK PAIN WITHOUT SCIATICA: Status: ACTIVE | Noted: 2020-01-13

## 2020-01-13 PROCEDURE — 99214 OFFICE O/P EST MOD 30 MIN: CPT | Performed by: NURSE PRACTITIONER

## 2020-01-13 PROCEDURE — 99214 OFFICE O/P EST MOD 30 MIN: CPT

## 2020-01-13 ASSESSMENT — PAIN DESCRIPTION - ORIENTATION: ORIENTATION: UPPER

## 2020-01-13 ASSESSMENT — PAIN DESCRIPTION - PAIN TYPE: TYPE: CHRONIC PAIN

## 2020-01-13 ASSESSMENT — PAIN SCALES - GENERAL: PAINLEVEL_OUTOF10: 4

## 2020-01-13 ASSESSMENT — PAIN DESCRIPTION - LOCATION: LOCATION: BACK

## 2020-01-13 NOTE — PROGRESS NOTES
[facility] Physical & Pain Medicine  Office Note    Patient Name: aCssius Chacko    MR #: [id number]    Account #: [de-identified]    : 1975    Age: 38 y.o. Sex: female    Date: 2020    PCP: CALI Antonio    Chief Complaint:   Chief Complaint   Patient presents with    Neck Pain    Headache     Daily       History of Present Illness:     Cassius Chacko is a 38 y.o. female who presents to the office for procedure follow-up and office visit. Since last appointment patient had Botox for migraines and previously a bilateral occipital nerve block with trigger points. Patient did receive rater than 50% reduction in pain after Botox injections however after approximately week 8 patient started having a significant increase in headaches. Since Botox was not due patient received bilateral occipital nerve blocks with cervical trigger points. This is helped keep migraines until her next Botox injection. Patient states that she has a new complaint of pain. It is in the right lower back. Patient states that pain is significantly aggravated with standing or doing general housework. Patient has difficulty getting from a seated position rather than sitting down. Patient denies any numbness or tingling down lower extremities. Patient denies any issues with bowel or bladder. Last Procedure:   Was Percentage of Pain Relief after Botox Injections for Migraines on 2019:  60 %    How long lasted:  6 weeks Yes  Were you able to decrease pain medication after treatment? Yes  Were you able to increase activity after treatment? Yes  Did you have any adverse reactions to treatment? No     Was Percentage of Pain Relief after Cervical Trigger Point Injections & Bilateral Occipital Nerve Block Injections on 2019:  60 %    How long lasted:  6 weeks Yes  Were you able to decrease pain medication after treatment? Yes  Were you able to increase activity after treatment?  Yes  Did you have having chronic headaches. Along with myofascial pain in the shoulders and thoracic spine. Through the years patient has been on numerous medications to treat headaches including the following but not limited to Topamax, amitriptyline, propranolol, Imitrex, gabapentin, and Lyrica, Fioricet does help with the intense headaches but he does not make headaches tolerable. She does follow with neurology and she has been started on Aimovig. And this is helping. Patient has been getting bilateral occipital nerve blocks along with trigger point injections in her cervical, thoracic, and lumbar spine and this is helped with her headaches as well along with her back pain. Past Medical Histoy  Past Medical History:   Diagnosis Date    ADHD (attention deficit hyperactivity disorder)     Anemia     Anxiety     Cervicalgia     Chiari malformation type I ([facility] [address]     s/p decompression     Chronic back pain     Depression     Headache(784.0)     Hiatal hernia     Hyperlipidemia     Indigestion     Obstructive sleep apnea     Mild with an AHI:  7.5 and  RDI:  14.7       Surgery History  Past Surgical History:   Procedure Laterality Date    BREAST BIOPSY  may 2014    Left     SECTION      x2    CHOLECYSTECTOMY      OTHER SURGICAL HISTORY  2014    Posterior Fossa Decompression in [address] [place] [address] History  family history includes Cancer in her maternal aunt and another family member; High Blood Pressure in her father and sister. Social History    Social History     Tobacco Use    Smoking status: Never Smoker    Smokeless tobacco: Never Used   Substance Use Topics    Alcohol use: No       Allergies  Ultram [tramadol]     Current Medications  Current Outpatient Medications   Medication Sig Dispense Refill    HYDROcodone-acetaminophen (NORCO) 5-325 MG per tablet Take 1 tablet by mouth every 8 hours as needed for Pain for up to 30 days.  60 tablet 0    butalbital-acetaminophen-caffeine (FIORICET, ESGIC) -40 MG per tablet Take 1 tablet by mouth 2 times daily as needed for Headaches or Migraine 30 tablet 2    Ketorolac Tromethamine (SPRIX) 15.75 MG/SPRAY SOLN 15.75 mg by Nasal route every 6-8 hours as needed (migraines) . Use up to 4 times per day. Do not use for longer than 5 days. 1 each 3    VYVANSE 30 MG capsule Take 1 capsule by mouth daily.  ibuprofen (ADVIL;MOTRIN) 800 MG tablet Take 1 tablet by mouth 3 times daily as needed for Pain 90 tablet 5    lidocaine (LIDODERM) 5 % Place 3 patches onto the skin daily 12 hours on, 12 hours off. (Patient not taking: Reported on 11/7/2019) 30 patch 2    Erenumab-aooe (AIMOVIG 140 DOSE) 70 MG/ML SOAJ Inject 140 mg into the skin every 30 days 2 pen 5    acetaminophen (TYLENOL) 650 MG extended release tablet Take 1 tablet by mouth every 8 hours as needed for Pain 60 tablet 3    ALPRAZolam (XANAX) 0.5 MG tablet Take 1 tablet by mouth 2 times daily as needed. .  2    zolpidem (AMBIEN) 10 MG tablet Take 1 tablet by mouth nightly. .  2    buPROPion (WELLBUTRIN XL) 300 MG extended release tablet Take 1 tablet by mouth daily  2    lamoTRIgine (LAMICTAL) 100 MG tablet Take 2.5 tablets by mouth nightly  2     No current facility-administered medications for this encounter. Review of Systems:  Review of Systems   Constitutional: Positive for activity change. Eyes: Positive for photophobia and visual disturbance. Gastrointestinal: Positive for nausea. Negative for constipation. Musculoskeletal: Positive for arthralgias, back pain, myalgias and neck pain. Neurological: Positive for headaches. Negative for weakness and numbness. Psychiatric/Behavioral: Positive for sleep disturbance. Negative for agitation, self-injury and suicidal ideas. The patient is not nervous/anxious.       14 point ROS negative besides that noted in HPI    Physical Exam:    Vitals:    01/13/20 1211   BP: 114/75   Pulse: 80   Resp: 18   Temp: (!) 44.6 °F (7 °C)   TempSrc: Oral   SpO2: 100%   Weight: 160 lb 6 oz (72.7 kg)   Height: 5' 3\" (1.6 m)     Body mass index is 28.41 kg/m². Physical Exam  Vitals signs and nursing note reviewed. Constitutional:       General: She is not in acute distress. Appearance: She is well-developed. HENT:      Head: Normocephalic. Right Ear: External ear normal.      Left Ear: External ear normal.      Nose: Nose normal.   Eyes:      Conjunctiva/sclera: Conjunctivae normal.      Pupils: Pupils are equal, round, and reactive to light. Neck:      Musculoskeletal: Normal range of motion and neck supple. Cardiovascular:      Rate and Rhythm: Normal rate and regular rhythm. Pulmonary:      Effort: Pulmonary effort is normal.      Breath sounds: Normal breath sounds. Abdominal:      General: Bowel sounds are normal.      Palpations: Abdomen is soft. Musculoskeletal:      Cervical back: She exhibits tenderness, pain and spasm. Thoracic back: She exhibits tenderness, pain and spasm. Lumbar back: She exhibits tenderness, pain and spasm. Comments: Continues to have Tender palpable knots noted in muscles of the cervical, thoracic and lumbar spine (trigger points identified)    Positive right SI tenderness noted. Positive distraction, positive thigh thrust, positive Hector's on right   Skin:     General: Skin is warm and dry. Neurological:      Mental Status: She is alert and oriented to person, place, and time. Cranial Nerves: No cranial nerve deficit. Sensory: No sensory deficit. Deep Tendon Reflexes: Reflexes are normal and symmetric. Psychiatric:         Behavior: Behavior normal.         Thought Content: Thought content normal.         Judgment: Judgment normal.     MRI exams received in the past 2 years:  Yes MRI Cervical Spine & MRI Brain       When 11/2019                                              Where [facility]       Imaging on chart: Yes         Imaging records requested:  No     CT exam ibuprofen. No refills needed at this time as refills were completed pharmacy on 1/7/2020  5. Previously scheduled Botox injections. 6.  Follow-up postprocedure or sooner if needed. If pain is not resolved by injection may consider MRI. 7.  If patient continues to have increasing in migraines at weeks 8 through 12 gradual bilateral ONB's    Discussion: Discussed exam findings and plan of care with patient. Patient agreed with POC and questions were asked and answered. Activity: Discussed exercise as beneficial to pain reduction, encouraged daily stretching with a focus on torso strengthening. Goal:  Pain Management Goals of Therapy:   [] Resolution in pain  [x] Decrease in pain level  [x] Improvement in ADL's  [x] Increase in activities with less pain  [] Decrease in medication    Controlled substance monitoring:    controlled substance monitoring: possible medication side effects, risk of tolerance and/or dependence, and alternative treatments discussed, no signs of potential drug abuse or diversion identified, UDS 10/19 compliant, YANCY report reviewed today- [id number], medication is effective - see note and medication contract signed on file      CC:  Roc RAMIREZ 99, APRN - CNP, 1/31/2020 at 1:27 PM    EMR dragon/transcription disclaimer: Much of this encounter note is electronic transcription/translation of spoken language to printed tach. Electronic translation of spoken language may be erroneous, or at times, nonsensical words or phrases may be inadvertently transcribed.  Although, I have reviewed the note for such errors, some may still exist.

## 2020-01-28 ENCOUNTER — HOSPITAL ENCOUNTER (OUTPATIENT)
Dept: GENERAL RADIOLOGY | Age: 45
Discharge: HOME | End: 2020-01-28
Payer: MEDICARE

## 2020-01-28 PROCEDURE — 73521 X-RAY EXAM HIPS BI 2 VIEWS: CPT

## 2020-01-28 PROCEDURE — 72110 X-RAY EXAM L-2 SPINE 4/>VWS: CPT

## 2020-01-29 ENCOUNTER — HOSPITAL ENCOUNTER (OUTPATIENT)
Dept: PAIN MANAGEMENT | Age: 45
Discharge: HOME | End: 2020-01-29
Payer: MEDICARE

## 2020-01-29 VITALS
OXYGEN SATURATION: 100 % | DIASTOLIC BLOOD PRESSURE: 70 MMHG | HEART RATE: 79 BPM | TEMPERATURE: 98.1 F | SYSTOLIC BLOOD PRESSURE: 128 MMHG | RESPIRATION RATE: 18 BRPM

## 2020-01-29 PROCEDURE — 20553 NJX 1/MLT TRIGGER POINTS 3/>: CPT

## 2020-01-29 PROCEDURE — 76942 ECHO GUIDE FOR BIOPSY: CPT | Performed by: NURSE PRACTITIONER

## 2020-01-29 PROCEDURE — 6360000002 HC RX W HCPCS

## 2020-01-29 PROCEDURE — 20611 DRAIN/INJ JOINT/BURSA W/US: CPT

## 2020-01-29 PROCEDURE — 20553 NJX 1/MLT TRIGGER POINTS 3/>: CPT | Performed by: NURSE PRACTITIONER

## 2020-01-29 PROCEDURE — 2500000003 HC RX 250 WO HCPCS

## 2020-01-29 RX ORDER — LIDOCAINE HYDROCHLORIDE 10 MG/ML
15 INJECTION, SOLUTION EPIDURAL; INFILTRATION; INTRACAUDAL; PERINEURAL ONCE
Status: DISCONTINUED | OUTPATIENT
Start: 2020-01-29 | End: 2020-01-31 | Stop reason: HOSPADM

## 2020-01-29 RX ORDER — TRIAMCINOLONE ACETONIDE 40 MG/ML
40 INJECTION, SUSPENSION INTRA-ARTICULAR; INTRAMUSCULAR ONCE
Status: DISCONTINUED | OUTPATIENT
Start: 2020-01-29 | End: 2020-01-31 | Stop reason: HOSPADM

## 2020-01-29 RX ORDER — LIDOCAINE HYDROCHLORIDE 10 MG/ML
15 INJECTION, SOLUTION EPIDURAL; INFILTRATION; INTRACAUDAL; PERINEURAL ONCE
Status: DISCONTINUED | OUTPATIENT
Start: 2020-01-29 | End: 2020-01-29 | Stop reason: CLARIF

## 2020-01-29 RX ORDER — BUPIVACAINE HYDROCHLORIDE 5 MG/ML
15 INJECTION, SOLUTION EPIDURAL; INTRACAUDAL ONCE
Status: DISCONTINUED | OUTPATIENT
Start: 2020-01-29 | End: 2020-01-31 | Stop reason: HOSPADM

## 2020-01-29 RX ORDER — BUPIVACAINE HYDROCHLORIDE 5 MG/ML
2 INJECTION, SOLUTION PERINEURAL ONCE
Status: DISCONTINUED | OUTPATIENT
Start: 2020-01-29 | End: 2020-01-29 | Stop reason: CLARIF

## 2020-01-29 RX ORDER — TRIAMCINOLONE ACETONIDE 40 MG/ML
40 INJECTION, SUSPENSION INTRA-ARTICULAR; INTRAMUSCULAR ONCE
Status: DISCONTINUED | OUTPATIENT
Start: 2020-01-29 | End: 2020-01-29 | Stop reason: CLARIF

## 2020-01-29 RX ORDER — BUPIVACAINE HYDROCHLORIDE 5 MG/ML
15 INJECTION, SOLUTION EPIDURAL; INTRACAUDAL ONCE
Status: DISCONTINUED | OUTPATIENT
Start: 2020-01-29 | End: 2020-01-29 | Stop reason: CLARIF

## 2020-01-29 RX ORDER — LIDOCAINE HYDROCHLORIDE 10 MG/ML
2 INJECTION, SOLUTION EPIDURAL; INFILTRATION; INTRACAUDAL; PERINEURAL ONCE
Status: DISCONTINUED | OUTPATIENT
Start: 2020-01-29 | End: 2020-01-31 | Stop reason: HOSPADM

## 2020-01-29 RX ORDER — LIDOCAINE HYDROCHLORIDE 10 MG/ML
2 INJECTION, SOLUTION EPIDURAL; INFILTRATION; INTRACAUDAL; PERINEURAL ONCE
Status: DISCONTINUED | OUTPATIENT
Start: 2020-01-29 | End: 2020-01-29 | Stop reason: CLARIF

## 2020-01-29 RX ORDER — BUPIVACAINE HYDROCHLORIDE 5 MG/ML
2 INJECTION, SOLUTION EPIDURAL; INTRACAUDAL ONCE
Status: DISCONTINUED | OUTPATIENT
Start: 2020-01-29 | End: 2020-01-31 | Stop reason: HOSPADM

## 2020-01-29 NOTE — PROGRESS NOTES
Procedure:  Level of Consciousness: [x]Alert [x]Oriented []Disoriented []Lethargic  Anxiety Level: [x]Calm []Anxious []Depressed []Other  Skin: [x]Warm [x]Dry []Cool []Moist []Intact []Other  Cardiovascular: [x]Palpitations: []Never []Occasionally []Frequently  Chest Pain: [x]No []Yes  Respiratory:  [x]Unlabored []Labored []Cough ([] Productive []Unproductive)  HCG Required: [x]No []Yes   Results: []Negative []Positive  Knowledge Level:        [x]Patient/Other verbalized understanding of pre-procedure instructions. [x]Assessment of post-op care needs (transportation, responsible caregiver)        [x]Able to discuss health care problems and how to deal with it.   Factors that Affect Teaching:        Language Barrier: [x]No []Yes - why:        Hearing Loss:        [x]No []Yes            Corrective Device:  []Yes [x]No        Vision Loss:           [x]No []Yes            Corrective Device:  []Yes [x]No        Memory Loss:       [x]No []Yes            []Short Term []Long Term  Motivational Level:  [x]Asks Questions                  []Extremely Anxious       [x]Seems Interested               []Seems Uninterested                  []Denies need for Education  Risk for Injury:  [x]Patient oriented to person, place and time  []History of frequent falls/loss of balance  Nutritional:  []Change in appetite   []Weight Gain   []Weight Loss  Functional:  []Requires assistance with ADL's

## 2020-01-29 NOTE — PROCEDURES
[facility] Physical & Pain Medicine    Patient Name: Ayanna Tong    : 1975                    Age: 38 y.o. Sex: female    Date: 2020    Pre-op Diagnosis: Myofascial Pain/ Muscle Spasms/ Cervicalgia    Post-op Diagnosis: Myofascial Pain/ Muscle Spasms/ Cervicalgia    Procedure: Cervical Trigger Point Injections    Performing Procedure: SANDRO Rocha, [facility]    Patient Vitals for the past 24 hrs:   BP Temp Temp src Pulse Resp SpO2   20 1058 128/70 98.1 °F (36.7 °C) Temporal 79 18 100 %       Description of Procedure:    After a brief physical assessment and failure to improve with conservative measures the patient presented for Cervical Trigger Point Injections The indications, limitations and possible complications were discussed with the patient and the patient elected to proceed with the procedure. After voluntary, informed and signed consent obtained the patient was placed in a seated position. Appropriate time out was obtained per policy. The area of maximal tenderness was palpated over the   bilaterally Cervical muscles - Splenius  Trapezius  Rhomboid The skin overlying these areas was marked with a skin marker. The skin overlying the proposed injection sites were then sprayed with Gebauer's Solution while protecting patient eyes. The areas were prepped using aseptic technique with CHG prep.  Each trigger point of the Cervical muscles - Splenius  Trapezius  Rhomboid was injected with approximately 2 ml of a solution of 5 ml of 1% Lidocaine Plain and 5 ml of 0.5% Marcaine Plain    Pre-op Diagnosis: Myofascial Pain/ Muscle Spasms    Post-op Diagnosis: Myofascial Pain/ Muscle Spasms    Procedure: Lumbar Trigger Point Injections    Performing Procedure: SANDRO Rocha; [facility]    Patient Vitals for the past 24 hrs:   BP Temp Temp src Pulse Resp SpO2   20 1058 128/70 98.1 °F (36.7 °C) Temporal 79 18 100 %       Description of Procedure:    After a brief physical assessment and failure to improve with conservative measures the patient presented for Lumbar Trigger Point Injections The indications, limitations and possible complications were discussed with the patient and the patient elected to proceed with the procedure. After voluntary, informed and signed consent obtained the patient was placed in a seated position. Appropriate time out was obtained per policy. The area of maximal tenderness was palpated over the bilaterally Lumbar Muscles - Lower Latissimus,  Erector Spinae, Lumbar Paraspinous. The skin overlying these areas was marked with a skin marker. The areas were prepped using aseptic technique with CHG prep. The skin overlying the proposed injection sites were then sprayed with Gebauer's Solution while protecting patient eyes. Each trigger point of the Lumbar Muscles - Lower Latissimus,  Erector Spinae, Lumbar Paraspinous was injected after negative aspiration was injected with approximately 1-2 ml of a solution of 5 ml of 1% Lidocaine Plain and 5 ml of 0.5% Marcaine Plain    Discharge: The patient tolerated the procedure well. There were no complications during the procedure and the patient was discharged home with discharge instructions. The patient has been instructed to contact the office should there be any complications or questions to arise between today and their next appointment. Plan: Will return to the office in 6 weeks for follow up    AMNA Yost CNP, 2020 at 11:07 AM     [facility] Physical & Pain Medicine    Patient Name: Carmel Elliott    : 1975    Age: 38 y.o.     Sex: female    Date: 2020    Pre-op Diagnosis: right  Sacroiliac Joint(s) Dysfunction/ Sacroiliitis    Post-op Diagnosis: right  Sacroiliac Joint(s) Dysfunction/ Sacroliliits    Procedure: Ultrasound Guided Injection of  right Sacroiliac Joint(s)     Performing Procedure:  Peg Avalos

## 2020-01-31 PROBLEM — M54.50 CHRONIC BILATERAL LOW BACK PAIN WITHOUT SCIATICA: Chronic | Status: ACTIVE | Noted: 2020-01-13

## 2020-01-31 PROBLEM — M54.81 BILATERAL OCCIPITAL NEURALGIA: Chronic | Status: ACTIVE | Noted: 2019-02-20

## 2020-01-31 PROBLEM — G89.29 CHRONIC BILATERAL LOW BACK PAIN WITHOUT SCIATICA: Chronic | Status: ACTIVE | Noted: 2020-01-13

## 2020-01-31 PROBLEM — M53.3 SI (SACROILIAC) JOINT DYSFUNCTION: Chronic | Status: ACTIVE | Noted: 2020-01-13

## 2020-01-31 ASSESSMENT — ENCOUNTER SYMPTOMS
PHOTOPHOBIA: 1
CONSTIPATION: 0
BACK PAIN: 1
NAUSEA: 1

## 2020-02-06 ENCOUNTER — TELEPHONE (OUTPATIENT)
Dept: PAIN MANAGEMENT | Age: 45
End: 2020-02-06

## 2020-02-21 RX ORDER — HYDROCODONE BITARTRATE AND ACETAMINOPHEN 5; 325 MG/1; MG/1
1 TABLET ORAL EVERY 8 HOURS PRN
Qty: 60 TABLET | Refills: 0 | Status: SHIPPED | OUTPATIENT
Start: 2020-02-21 | End: 2020-03-23 | Stop reason: SDUPTHER

## 2020-03-04 ENCOUNTER — OFFICE VISIT (OUTPATIENT)
Dept: NEUROSURGERY | Age: 45
End: 2020-03-04
Payer: MEDICARE

## 2020-03-04 ENCOUNTER — TELEPHONE (OUTPATIENT)
Dept: NEUROSURGERY | Age: 45
End: 2020-03-04

## 2020-03-04 ENCOUNTER — HOSPITAL ENCOUNTER (OUTPATIENT)
Dept: PAIN MANAGEMENT | Age: 45
Discharge: HOME | End: 2020-03-04
Payer: MEDICARE

## 2020-03-04 VITALS
DIASTOLIC BLOOD PRESSURE: 83 MMHG | BODY MASS INDEX: 26.29 KG/M2 | SYSTOLIC BLOOD PRESSURE: 129 MMHG | OXYGEN SATURATION: 98 % | HEART RATE: 89 BPM | HEIGHT: 64 IN | WEIGHT: 154 LBS

## 2020-03-04 VITALS
OXYGEN SATURATION: 99 % | RESPIRATION RATE: 18 BRPM | HEART RATE: 79 BPM | TEMPERATURE: 98.9 F | SYSTOLIC BLOOD PRESSURE: 127 MMHG | DIASTOLIC BLOOD PRESSURE: 79 MMHG

## 2020-03-04 PROCEDURE — G8419 CALC BMI OUT NRM PARAM NOF/U: HCPCS | Performed by: NURSE PRACTITIONER

## 2020-03-04 PROCEDURE — 99213 OFFICE O/P EST LOW 20 MIN: CPT | Performed by: NURSE PRACTITIONER

## 2020-03-04 PROCEDURE — G8484 FLU IMMUNIZE NO ADMIN: HCPCS | Performed by: NURSE PRACTITIONER

## 2020-03-04 PROCEDURE — 2580000003 HC RX 258

## 2020-03-04 PROCEDURE — 6360000002 HC RX W HCPCS

## 2020-03-04 PROCEDURE — 64615 CHEMODENERV MUSC MIGRAINE: CPT

## 2020-03-04 PROCEDURE — G8427 DOCREV CUR MEDS BY ELIG CLIN: HCPCS | Performed by: NURSE PRACTITIONER

## 2020-03-04 PROCEDURE — 1036F TOBACCO NON-USER: CPT | Performed by: NURSE PRACTITIONER

## 2020-03-04 PROCEDURE — 64615 CHEMODENERV MUSC MIGRAINE: CPT | Performed by: NURSE PRACTITIONER

## 2020-03-04 NOTE — TELEPHONE ENCOUNTER
Lindapeeweezak Dario (Key: [id number])   Rx #: [id number]   Ubrelvy 50MG tablets   Form  [employer] Part D Electronic Prior Authorization Form (2017 [place])   Created   2 hours ago   Sent to Plan   3 minutes ago   Plan Response   3 minutes ago   Submit Clinical Questions   1 minute ago   Determination   Wait for Determination  Please wait for [employer] 2017 NCP[place] to return a determination.

## 2020-03-04 NOTE — PROGRESS NOTES
Sig Dispense Refill    Ubrogepant (UBRELVY) 50 MG TABS Take 1 tablet at the onset of migraine; may repeat once in 2 hours if no improvement. Do not exceed 2 tablets in 24 hours. 10 tablet 2    HYDROcodone-acetaminophen (NORCO) 5-325 MG per tablet Take 1 tablet by mouth every 8 hours as needed for Pain for up to 30 days. 60 tablet 0    butalbital-acetaminophen-caffeine (FIORICET, ESGIC) -40 MG per tablet Take 1 tablet by mouth 2 times daily as needed for Headaches or Migraine 30 tablet 2    VYVANSE 30 MG capsule Take 1 capsule by mouth daily.  ibuprofen (ADVIL;MOTRIN) 800 MG tablet Take 1 tablet by mouth 3 times daily as needed for Pain 90 tablet 5    lidocaine (LIDODERM) 5 % Place 3 patches onto the skin daily 12 hours on, 12 hours off. 30 patch 2    Erenumab-aooe (AIMOVIG 140 DOSE) 70 MG/ML SOAJ Inject 140 mg into the skin every 30 days 2 pen 5    ALPRAZolam (XANAX) 0.5 MG tablet Take 1 tablet by mouth 2 times daily as needed. .  2    zolpidem (AMBIEN) 10 MG tablet Take 1 tablet by mouth nightly. .  2    buPROPion (WELLBUTRIN XL) 300 MG extended release tablet Take 1 tablet by mouth daily  2    lamoTRIgine (LAMICTAL) 100 MG tablet Take 2.5 tablets by mouth nightly  2     No current facility-administered medications for this visit. Facility-Administered Medications Ordered in Other Visits   Medication Dose Route Frequency Provider Last Rate Last Dose    onabotulinumtoxin A (BOTOX) injection 155 Units  155 Units Intramuscular Once Darylene Motley, APRN - CNP           Allergies   Allergen Reactions    Ultram [Tramadol] Hives     REVIEW OF SYSTEMS  Constitutional: []? Fever []? Sweat []? Chills []? Recent Injury [x]? Denies all unless marked  HEENT:[x]? Headache  []? Head Injury/Hearing Loss  []? Sore Throat  []? Ear Ache/Dizziness  [x]? Denies all unless marked  Spine:  []? Neck pain  []? Back pain  []? Sciaticia  [x]? Denies all unless marked  Cardiovascular:[]? Heart Disease []? Chest Pain []? Palpitations  [x]? Denies all unless marked  Pulmonary: []? Shortness of Breath []? Cough   [x]? Denies all unless marke  Gastrointestinal: []? Nausea  []? Vomiting  []? Abdominal Pain  []? Constipation  []? Diarrhea  []? Dark Bloody Stools  [x]? Denies all unless marked  Psychiatric/Behavioral:[]? Depression []? Anxiety [x]? Denies all unless marked  Genitourinary:   []? Frequency  []? Urgency  []? Incontinence []? Pain with Urination  [x]? Denies all unless marked  Extremities: []? Pain  []? Swelling  [x]? Denies all unless marked  Musculoskeletal: []? Muscle Pain  []? Joint Pain  []? Arthritis []? Muscle Cramps []? Muscle Twitches  [x]? Denies all unless marked  Sleep: []? Insomnia []? Snoring []? Restless Legs []? Sleep Apnea  []? Daytime Sleepiness  [x]? Denies all unless marked  Skin:[]? Rash []? Skin Discoloration [x]? Denies all unless marked   Neurological: []? Visual Disturbance/Memory Loss []? Loss of Balance []? Slurred Speech/Weakness []? Seizures  []? Vertigo/Dizziness [x]? Denies all unless marked    The MA has completed the ROS with the patient. I have reviewed it in its' entirety with the patient and agree with the documentation. PHYSICAL EXAM  /83   Pulse 89   Ht 5' 4\" (1.626 m)   Wt 154 lb (69.9 kg)   SpO2 98%   BMI 26.43 kg/m²      Constitutional - No acute distress    HEENT- Conjunctiva normal.  No scars, masses, or lesions over external nose or ears, no neck masses noted, no jugular vein distension, no bruit  Musculoskeletal - No significant wasting of muscles noted, no bony deformities  Cardiac- regular rate and rhythm   Respiratory- normal effort; no use of accessory muscles   Extremities - No clubbing, cyanosis or edema  Skin - Warm, dry, and intact.   No rash, erythema, or pallor  Psychiatric - Mood, affect, and behavior appear normal      NEUROLOGICAL EXAM    Mental status   [x]Awake, alert, oriented   [x]Affect attention and concentration appear appropriate  [x]Recent and remote

## 2020-03-04 NOTE — PROCEDURES
[address]    Patient Name: Jim Rivas    : 1975                    Age: 54 y.o. Sex: female    Date: 2020    Pre-op Diagnosis: Chronic Migraines    Post-op Diagnosis: Chronic Migraines    Procedure: Botox injections x 155 units (45 units wasted)    Performing Procedure: AMNA Briggs    Patient Vitals for the past 24 hrs:   BP Temp Temp src Pulse Resp SpO2   20 0957 127/79 98.9 °F (37.2 °C) Temporal 79 18 99 %       Previous Injections:  Patient had 2 - 3 headaches/migraines over the past month. Indications:    Jim Rivas has been treated for problems with chronic migraine headaches. The patient was taken through a conservative course of treatment without complete resolution of symptoms. Botox injection therapy was offered and after the risks and benefits of the procedure were explained to the patient, we had agreed to proceed. The patient was taken to the procedure room and placed in the seated position. The following muscles were identified using palpation and standard landmarks. These muscles were marked and prepped with alcohol. A total of 155 units were injected after careful aspiration and the following distribution bilaterally:  10 units in 2 sites, procerus 5 units in one site, frontalis 20 units in 4 sites, temporalis 40 unit in 8 sites, occipitals 30 units in 6 sites, cervical paraspinals 20 units in 4 sites, and trapezius 30 units in 6 sites. Discharge: The patient tolerated the procedure well. There were no complications during the procedure and the patient was discharged home with discharge instructions. The patient has been instructed to contact the office should there be any complications or questions to arise between today and their next appointment. Plan: Will return to the office for office visit in 4 - 6 weeks for follow up and office visit.     AMNA Mensah - CNP, 3/4/2020 at 12:02 PM

## 2020-03-04 NOTE — PROGRESS NOTES
REVIEW OF SYSTEMS    Constitutional: []Fever []Sweat []Chills [] Recent Injury [x] Denies all unless marked  HEENT:[x]Headache  [] Head Injury/Hearing Loss  [] Sore Throat  [] Ear Ache/Dizziness  [x] Denies all unless marked  Spine:  [] Neck pain  [] Back pain  [] Sciaticia  [x] Denies all unless marked  Cardiovascular:[]Heart Disease []Chest Pain [] Palpitations  [x] Denies all unless marked  Pulmonary: []Shortness of Breath []Cough   [x] Denies all unless marke  Gastrointestinal: []Nausea  []Vomiting  []Abdominal Pain  []Constipation  []Diarrhea  []Dark Bloody Stools  [x] Denies all unless marked  Psychiatric/Behavioral:[] Depression [] Anxiety [x] Denies all unless marked  Genitourinary:   [] Frequency  [] Urgency  [] Incontinence [] Pain with Urination  [x] Denies all unless marked  Extremities: []Pain  []Swelling  [x] Denies all unless marked  Musculoskeletal: [] Muscle Pain  [] Joint Pain  [] Arthritis [] Muscle Cramps [] Muscle Twitches  [x] Denies all unless marked  Sleep: [] Insomnia [] Snoring [] Restless Legs [] Sleep Apnea  [] Daytime Sleepiness  [x] Denies all unless marked  Skin:[] Rash [] Skin Discoloration [x] Denies all unless marked   Neurological: []Visual Disturbance/Memory Loss [] Loss of Balance [] Slurred Speech/Weakness [] Seizures  [] Vertigo/Dizziness [x] Denies all unless marked

## 2020-03-06 ENCOUNTER — TELEPHONE (OUTPATIENT)
Dept: PAIN MANAGEMENT | Age: 45
End: 2020-03-06

## 2020-03-10 ENCOUNTER — TELEPHONE (OUTPATIENT)
Dept: NEUROLOGY | Age: 45
End: 2020-03-10

## 2020-03-10 RX ORDER — GALCANEZUMAB 120 MG/ML
120 INJECTION, SOLUTION SUBCUTANEOUS
Qty: 1 PEN | Refills: 5 | Status: SHIPPED | OUTPATIENT
Start: 2020-03-10 | End: 2020-09-04

## 2020-03-11 NOTE — TELEPHONE ENCOUNTER
I called and s/w the patient notifying her that the emgality was sent in by Jing Amado and [facility] will now be discontinued. It will likely require a prior auth, patient is aware of this as well. We will work on getting that started for her so that she is able to pick this up when she is due for another injection.

## 2020-03-23 ENCOUNTER — HOSPITAL ENCOUNTER (OUTPATIENT)
Dept: PAIN MANAGEMENT | Age: 45
Discharge: HOME | End: 2020-03-23
Payer: MEDICARE

## 2020-03-23 VITALS
RESPIRATION RATE: 20 BRPM | TEMPERATURE: 98.8 F | DIASTOLIC BLOOD PRESSURE: 73 MMHG | HEART RATE: 74 BPM | SYSTOLIC BLOOD PRESSURE: 113 MMHG

## 2020-03-23 PROCEDURE — 99214 OFFICE O/P EST MOD 30 MIN: CPT | Performed by: NURSE PRACTITIONER

## 2020-03-23 PROCEDURE — 99214 OFFICE O/P EST MOD 30 MIN: CPT

## 2020-03-23 RX ORDER — HYDROCODONE BITARTRATE AND ACETAMINOPHEN 5; 325 MG/1; MG/1
1 TABLET ORAL EVERY 8 HOURS PRN
Qty: 60 TABLET | Refills: 0 | Status: SHIPPED | OUTPATIENT
Start: 2020-03-23 | End: 2020-05-26 | Stop reason: SDUPTHER

## 2020-03-23 RX ORDER — UBROGEPANT 50 MG/1
50 TABLET ORAL DAILY PRN
Qty: 9 TABLET | Refills: 2 | Status: SHIPPED | OUTPATIENT
Start: 2020-03-23 | End: 2020-06-22 | Stop reason: SDUPTHER

## 2020-03-23 ASSESSMENT — PAIN SCALES - GENERAL: PAINLEVEL_OUTOF10: 6

## 2020-03-23 ASSESSMENT — PAIN - FUNCTIONAL ASSESSMENT: PAIN_FUNCTIONAL_ASSESSMENT: PREVENTS OR INTERFERES SOME ACTIVE ACTIVITIES AND ADLS

## 2020-03-23 ASSESSMENT — ENCOUNTER SYMPTOMS
VISUAL CHANGE: 1
CONSTIPATION: 0
NAUSEA: 1
BACK PAIN: 1
PHOTOPHOBIA: 1

## 2020-03-23 ASSESSMENT — PAIN DESCRIPTION - LOCATION: LOCATION: BACK

## 2020-03-23 ASSESSMENT — PAIN DESCRIPTION - FREQUENCY: FREQUENCY: CONTINUOUS

## 2020-03-23 ASSESSMENT — PAIN DESCRIPTION - PAIN TYPE: TYPE: CHRONIC PAIN

## 2020-03-23 ASSESSMENT — PAIN DESCRIPTION - ONSET: ONSET: ON-GOING

## 2020-03-23 ASSESSMENT — PAIN DESCRIPTION - PROGRESSION: CLINICAL_PROGRESSION: NOT CHANGED

## 2020-03-23 ASSESSMENT — PAIN DESCRIPTION - ORIENTATION: ORIENTATION: UPPER

## 2020-03-23 ASSESSMENT — PAIN DESCRIPTION - DESCRIPTORS: DESCRIPTORS: ACHING;CONSTANT;THROBBING

## 2020-03-23 NOTE — PROGRESS NOTES
[facility]  Office Note    Patient Name: Anna Gutierrez    MR #: [id number]    Account #: [de-identified]    : 1975    Age: 54 y.o. Sex: female    Date: 3/23/2020    PCP: CALI Huston    Chief Complaint:   Chief Complaint   Patient presents with    Migraine       History of Present Illness:     Anna Gutierrez is a 54 y.o. female who presents to the office for follow up on procedures and office visit. Patient has not been able get in contact with mental health provider and she is out of some of her mental health medication. Patient states that her headaches are worse right now due to stress and anxiety. Neurology switched patient from [address] to emgality for better preventative measures. Patient was given samples of ubrelvy which did help but she has not received a prescription. One was sent to the pharmacy but patient states that she was unsure if medication needed a PA. Patient has failed triptans, mirapex, minimal relief from fiorcet. Patient still has breakthrough headaches after week 6 with Botox. Patient states she had good relief from SI injection and she would like to have another injection when able. Migraine   This is a chronic problem. The current episode started more than 1 year ago. The problem occurs constantly. The problem has been gradually worsening. Associated symptoms include arthralgias, headaches, myalgias, nausea, neck pain, numbness and a visual change. Pertinent negatives include no weakness. Last Procedure since last office visit:     Was Percentage of Pain Relief after Botox Injections on 3/4/2020:  60 %    How long lasted:  6 weeks   Were you able to decrease pain medication after treatment? yes  Were you able to increase activity after treatment? yes  Did you have any adverse reactions to treatment?  No     Was Percentage of Pain Relief after Cervical & Lumbar Trigger Point Injections and Right Sacroiliac Joint Injections on 1/29/2020:  60 %    How long lasted:  6 weeks  Were you able to decrease pain medication after treatment? Yes  Were you able to increase activity after treatment? Yes  Did you have any adverse reactions to treatment? No    Screening Tools:    PEG Score: 2.6     Last PEG Score: 3.7     Annual ORT Score: 7     Annual PHQ Score: 18    Current Pain Assessment  Pain Assessment  Pain Assessment: 0-10  Pain Level: 6  Patient's Stated Pain Goal: No pain  Pain Type: Chronic pain  Pain Location: Back  Pain Orientation: Upper  Pain Descriptors: Aching, Constant, Throbbing  Pain Frequency: Continuous  Pain Onset: On-going  Clinical Progression: Not changed  Functional Pain Assessment: Prevents or interferes some active activities and ADLs  Non-Pharmaceutical Pain Intervention(s): Repositioned, Rest  Response to Pain Intervention: Patient Satisfied  Multiple Pain Sites: No    Past Visit HPI:  1/13/2020  presents to the office for procedure follow-up and office visit. Since last appointment patient had Botox for migraines and previously a bilateral occipital nerve block with trigger points. Patient did receive rater than 50% reduction in pain after Botox injections however after approximately week 8 patient started having a significant increase in headaches. Since Botox was not due patient received bilateral occipital nerve blocks with cervical trigger points. This is helped keep migraines until her next Botox injection.     Patient states that she has a new complaint of pain. It is in the right lower back. Patient states that pain is significantly aggravated with standing or doing general housework. Patient has difficulty getting from a seated position rather than sitting down. Patient denies any numbness or tingling down lower extremities. Patient denies any issues with bowel or bladder.     10/28/2019  presents for procedure follow-up and office visit. Juwan Arellano had Botox injections on 8/28/2019 for migraines and she sustained approximately 50% relief and is still having effectiveness at this time.  Patient had cervical and thoracic trigger points on 9/11/2019 sustained greater than 90% relief he is to have relief at this time.  Patient had lumbar trigger points on 9/25/2019 she sustained 90% relief and is still having relief at this time.     Patient states that headaches are changing.  Patient states that she has noticed nystagmus as she had prior to decompression surgery.  Patient has periods of a disorientation/dizziness.  Patient stated that at night her hands will turn very red and feel hot and to make the feeling go away she will put her hands in cold water.  Patient states that light has always been a trigger to her migraines but it is a heavy trigger and even indoor lighting is affecting her migraines. Carlo Baez states that she spends a lot of time in the house with curtains pulled and lights off due to headaches.  She did state wearing polarized sunglasses does help outdoors but she cannot see indoors with the sunglasses.  Patient is nervous that her headaches are changing but do have some similarities to symptoms she was having pre-decompression surgery.  She was doing better at the last office visit PEG score was a 3.3 which is now up to a 6.7.  Patient states that she is taking her ibuprofen and her pain medication more regularly than she has in the past this is concerning to her. Ledy Alanis has not had an MRI in greater than a year. Ethyl Sasha is not scheduled to see neurology until February 2020.     8/8/19  presents to the office for chronic migraine, chronic neck pain, and thoracic back pain.  On 5/22/2019 patient had bilateral occipital nerve blocks cervical trigger points.  On 6/26/2019 patient had thoracic trigger point injections.  Patient had 60% relief for greater than 3 weeks before relief started to diminish.  Patient states that these occipital nerve blocks were not as effective as previous been getting bilateral occipital nerve blocks along with trigger point injections in her cervical, thoracic, and lumbar spine and this is helped with her headaches as well along with her back pain. Past Medical History  Past Medical History:   Diagnosis Date    ADHD (attention deficit hyperactivity disorder)     Anemia     Anxiety     Cervicalgia     Chiari malformation type I ([facility] [address]     s/p decompression     Chronic back pain     Depression     Headache(784.0)     Hiatal hernia     Hyperlipidemia     Indigestion     Obstructive sleep apnea     Mild with an AHI:  7.5 and  RDI:  14.7     Surgery History  Past Surgical History:   Procedure Laterality Date    BREAST BIOPSY  may 2014    Left     SECTION      x2    CHOLECYSTECTOMY      OTHER SURGICAL HISTORY      [facility] Decompression in [address] [place] [address] History  family history includes Cancer in her maternal aunt and another family member; High Blood Pressure in her father and sister. Social History    Social History     Tobacco Use    Smoking status: Never Smoker    Smokeless tobacco: Never Used   Substance Use Topics    Alcohol use: No     Allergies  Ultram [tramadol]     Current Medications  Current Outpatient Medications   Medication Sig Dispense Refill    Galcanezumab-gnlm (EMGALITY) 120 MG/ML SOAJ Inject 120 mg into the skin every 30 days 1 pen 5    Ubrogepant (UBRELVY) 50 MG TABS Take 1 tablet at the onset of migraine; may repeat once in 2 hours if no improvement. Do not exceed 2 tablets in 24 hours. 10 tablet 2    HYDROcodone-acetaminophen (NORCO) 5-325 MG per tablet Take 1 tablet by mouth every 8 hours as needed for Pain for up to 30 days. 60 tablet 0    butalbital-acetaminophen-caffeine (FIORICET, ESGIC) -40 MG per tablet Take 1 tablet by mouth 2 times daily as needed for Headaches or Migraine 30 tablet 2    VYVANSE 30 MG capsule Take 1 capsule by mouth daily.       ibuprofen (ADVIL;MOTRIN) 800 MG tablet Take 1 tablet by mouth 3 times daily as needed for Pain 90 tablet 5    lidocaine (LIDODERM) 5 % Place 3 patches onto the skin daily 12 hours on, 12 hours off. 30 patch 2    ALPRAZolam (XANAX) 0.5 MG tablet Take 1 tablet by mouth 2 times daily as needed. .  2    zolpidem (AMBIEN) 10 MG tablet Take 1 tablet by mouth nightly. .  2    buPROPion (WELLBUTRIN XL) 300 MG extended release tablet Take 1 tablet by mouth daily  2    lamoTRIgine (LAMICTAL) 100 MG tablet Take 2.5 tablets by mouth nightly  2     No current facility-administered medications for this encounter. Review of Systems:  Review of Systems   Constitutional: Positive for activity change. Eyes: Positive for photophobia and visual disturbance. Gastrointestinal: Positive for nausea. Negative for constipation. Musculoskeletal: Positive for arthralgias, back pain, myalgias and neck pain. Neurological: Positive for numbness and headaches. Negative for weakness. Psychiatric/Behavioral: Positive for sleep disturbance. Negative for agitation, self-injury and suicidal ideas. The patient is not nervous/anxious. 14 point ROS negative besides that noted in HPI    Physical Exam:    Vitals:    03/23/20 1056   BP: 113/73   Pulse: 74   Resp: 20   Temp: 98.8 °F (37.1 °C)   TempSrc: Temporal       There is no height or weight on file to calculate BMI. Physical Exam  Vitals signs and nursing note reviewed. Constitutional:       General: She is not in acute distress. Appearance: She is well-developed. HENT:      Head: Normocephalic. Right Ear: External ear normal.      Left Ear: External ear normal.      Nose: Nose normal.   Eyes:      Conjunctiva/sclera: Conjunctivae normal.      Pupils: Pupils are equal, round, and reactive to light. Neck:      Musculoskeletal: Normal range of motion and neck supple. Cardiovascular:      Rate and Rhythm: Normal rate and regular rhythm.    Pulmonary:      Effort: Pulmonary effort is normal.      Breath sounds: Normal breath sounds. Abdominal:      General: Bowel sounds are normal.      Palpations: Abdomen is soft. Musculoskeletal:      Cervical back: She exhibits tenderness, pain and spasm. Thoracic back: She exhibits tenderness, pain and spasm. Lumbar back: She exhibits tenderness, pain and spasm. Comments: Improved since injections  Continues to have Tender palpable knots noted in muscles of the cervical, thoracic and lumbar spine (trigger points identified)    Improved since injections  Positive right SI tenderness noted. Positive distraction, positive thigh thrust, positive Hector's on right   Skin:     General: Skin is warm and dry. Neurological:      Mental Status: She is alert and oriented to person, place, and time. Cranial Nerves: No cranial nerve deficit. Sensory: No sensory deficit. Deep Tendon Reflexes: Reflexes are normal and symmetric. Psychiatric:         Behavior: Behavior normal.         Thought Content: Thought content normal.         Judgment: Judgment normal.     MRI exams received in the past 2 years:  Yes MRI Cervical Spine       When 2019                                              Where [facility]       Imaging on chart: Yes         Imaging records requested: No     CT exam received during the last 12 months: No       When na                                              Where na       Imaging on chart: No         Imaging records requested: No     X-ray exam received during the last 12 months: Yes XR Lumbar Spine & XR Bilateral Hips/Pelvis       When 2020                                              Where [facility]       Imaging on chart: Yes         Imaging records requested: No     Nerve Conduction Study/EMG:  No       When na                                              Where na       Imaging on chart: No         Imaging records requested:  No     Physical therapy: No       When: na                                               Where  na     Behavior Health Yes       When: currently                                                 Where Dr. Devante Cannon @ [facility]       When: na                                             Where  na    Most recent imaging, testing, and response to counseling and/or rehabilitation were reviewed with patient. [x] Yes  [] No    Labs:  Lab Results   Component Value Date     08/27/2011    K 4.0 08/27/2011     08/27/2011    CO2 32 08/27/2011    BUN 10 08/27/2011    CREATININE 0.8 08/27/2011    GLUCOSE 94 08/27/2011    CALCIUM 9.3 08/27/2011        Lab Results   Component Value Date    WBC 6.87 08/27/2011    HGB 12.6 08/27/2011    HCT 38.0 08/27/2011    MCV 91.1 08/27/2011     08/27/2011       Assessment:  Active Problems:    Restless leg syndrome    Chiari malformation type I (HCC)    Intractable migraine without aura and without status migrainosus    Myofascial pain    Bilateral occipital neuralgia    SI (sacroiliac) joint dysfunction    Chronic bilateral low back pain without sciatica  Resolved Problems:    * No resolved hospital problems. *      Plan:  1. Schedule repeat Right SI, cervical TP's and Lumbar TP's once elective procedures are allowed due to covid 19 mandates. 2. Continue botox injections on migraines to prevent relapse and prevent ER visits  3. Continue Norco as previously prescribed with refill sent today per Jm Fontenot past due. 4. Continue motrin, fioricet with no refills needed  5. Antunez Ates for break through migraines with samples given  6. Follow up in 3 months or sooner if needed. Discussion: Discussed exam findings and plan of care with patient. Patient agreed with POC and questions were asked and answered. Activity: Discussed exercise as beneficial to pain reduction, encouraged daily stretching with a focus on torso strengthening.     Goal:  Pain Management Goals of Therapy:   [] Resolution in pain  [x] Decrease in pain level  [x] Improvement in

## 2020-03-23 NOTE — PROGRESS NOTES
Nursing Admission Record    Current Issues / Falls / ER Visits: Follow up procedure and imaging    Was Percentage of Pain Relief after Botox Injections on 3/4/2020:  60 %    How long lasted:  6 weeks   Were you able to decrease pain medication after treatment? yes  Were you able to increase activity after treatment? yes  Did you have any adverse reactions to treatment? No    Was Percentage of Pain Relief after Cervical & Lumbar Trigger Point Injections and Right Sacroiliac Joint Injections on 1/29/2020:  60 %    How long lasted:  6 weeks  Were you able to decrease pain medication after treatment? Yes  Were you able to increase activity after treatment? Yes  Did you have any adverse reactions to treatment? No    Opioids Prescribed: Norco 5mg Q8HRS PRN #60    Was Medication Brought to Appt: No    Hx of any Neck/Back Surgeries? None    Is Patient currently taking a blood thinner?  None    MRI exams received in the past 2 years:  Yes MRI Cervical Spine       When 2019                                              Where [facility]       Imaging on chart: Yes         Imaging records requested: No    CT exam received during the last 12 months: No       When na                                              Where na       Imaging on chart: No         Imaging records requested: No    X-ray exam received during the last 12 months: Yes XR Lumbar Spine & XR Bilateral Hips/Pelvis       When 2020                                              Where [facility]       Imaging on chart: Yes         Imaging records requested: No    Nerve Conduction Study/EMG:  No       When na                                              Where na       Imaging on chart: No         Imaging records requested: No    Physical therapy: No       When: na                                               Where  na     Behavior Health Yes       When: currently                                                 Where Dr. Yuki Ballesteros @ [facility]  No       When: na

## 2020-05-26 ENCOUNTER — TELEPHONE (OUTPATIENT)
Dept: NEUROSURGERY | Age: 45
End: 2020-05-26

## 2020-05-27 ENCOUNTER — HOSPITAL ENCOUNTER (OUTPATIENT)
Dept: PAIN MANAGEMENT | Age: 45
Discharge: HOME | End: 2020-05-27
Payer: MEDICARE

## 2020-05-27 VITALS
TEMPERATURE: 98.7 F | OXYGEN SATURATION: 98 % | SYSTOLIC BLOOD PRESSURE: 120 MMHG | HEART RATE: 80 BPM | RESPIRATION RATE: 18 BRPM | DIASTOLIC BLOOD PRESSURE: 70 MMHG

## 2020-05-27 PROCEDURE — 64615 CHEMODENERV MUSC MIGRAINE: CPT

## 2020-05-27 PROCEDURE — 64615 CHEMODENERV MUSC MIGRAINE: CPT | Performed by: NURSE PRACTITIONER

## 2020-05-27 PROCEDURE — 6360000002 HC RX W HCPCS

## 2020-05-27 NOTE — PROCEDURES
[address]    Patient Name: Trey Melendez    : 1975                    Age: 54 y.o. Sex: female    Date: May 27, 2020    Pre-op Diagnosis: Chronic Migraines    Post-op Diagnosis: Chronic Migraines    Procedure: Botox injections x 155 units (45 units wasted)    Performing Procedure: AMNA Stubbs    Patient Vitals for the past 24 hrs:   BP Temp Temp src Pulse Resp SpO2   20 1035 120/70 98.7 °F (37.1 °C) Temporal 80 18 98 %       Previous Injections:  Patient had 30/30 headaches/migraines over the past month. Indications:    Trey Melenedz has been treated for problems with chronic migraine headaches. The patient was taken through a conservative course of treatment without complete resolution of symptoms. Botox injection therapy was offered and after the risks and benefits of the procedure were explained to the patient, we had agreed to proceed. The patient was taken to the procedure room and placed in the seated position. The following muscles were identified using palpation and standard landmarks. These muscles were marked and prepped with alcohol. A total of 155 units were injected after careful aspiration and the following distribution bilaterally:  10 units in 2 sites, procerus 5 units in one site, frontalis 20 units in 4 sites, temporalis 40 unit in 8 sites, occipitals 30 units in 6 sites, cervical paraspinals 20 units in 4 sites, and trapezius 30 units in 6 sites. Discharge: The patient tolerated the procedure well. There were no complications during the procedure and the patient was discharged home with discharge instructions. The patient has been instructed to contact the office should there be any complications or questions to arise between today and their next appointment. Plan: Will return to the office for office visit in 4 - 6 weeks for follow up and office visit.     AMNA Juárez - CNP, 2020 at

## 2020-05-28 RX ORDER — HYDROCODONE BITARTRATE AND ACETAMINOPHEN 5; 325 MG/1; MG/1
1 TABLET ORAL EVERY 8 HOURS PRN
Qty: 60 TABLET | Refills: 0 | Status: SHIPPED | OUTPATIENT
Start: 2020-05-28 | End: 2020-06-22 | Stop reason: SDUPTHER

## 2020-06-04 ENCOUNTER — TELEPHONE (OUTPATIENT)
Dept: PAIN MANAGEMENT | Age: 45
End: 2020-06-04

## 2020-06-22 ENCOUNTER — HOSPITAL ENCOUNTER (OUTPATIENT)
Dept: PAIN MANAGEMENT | Age: 45
Discharge: HOME | End: 2020-06-22
Payer: MEDICARE

## 2020-06-22 VITALS
OXYGEN SATURATION: 100 % | RESPIRATION RATE: 18 BRPM | BODY MASS INDEX: 25.71 KG/M2 | SYSTOLIC BLOOD PRESSURE: 109 MMHG | TEMPERATURE: 96.8 F | HEIGHT: 63 IN | WEIGHT: 145.13 LBS | HEART RATE: 69 BPM | DIASTOLIC BLOOD PRESSURE: 69 MMHG

## 2020-06-22 PROBLEM — Z02.89 PAIN MANAGEMENT CONTRACT AGREEMENT: Status: ACTIVE | Noted: 2020-06-22

## 2020-06-22 PROCEDURE — 99213 OFFICE O/P EST LOW 20 MIN: CPT | Performed by: NURSE PRACTITIONER

## 2020-06-22 PROCEDURE — 99215 OFFICE O/P EST HI 40 MIN: CPT

## 2020-06-22 RX ORDER — UBROGEPANT 100 MG/1
100 TABLET ORAL DAILY PRN
Qty: 10 TABLET | Refills: 2 | Status: SHIPPED | OUTPATIENT
Start: 2020-06-22 | End: 2020-09-14 | Stop reason: SDUPTHER

## 2020-06-22 RX ORDER — UBROGEPANT 50 MG/1
100 TABLET ORAL DAILY PRN
Qty: 9 TABLET | Refills: 2 | Status: SHIPPED | OUTPATIENT
Start: 2020-06-22 | End: 2020-06-22 | Stop reason: DRUGHIGH

## 2020-06-22 RX ORDER — HYDROCODONE BITARTRATE AND ACETAMINOPHEN 5; 325 MG/1; MG/1
1 TABLET ORAL EVERY 8 HOURS PRN
Qty: 60 TABLET | Refills: 0 | Status: SHIPPED | OUTPATIENT
Start: 2020-06-27 | End: 2020-08-05 | Stop reason: SDUPTHER

## 2020-06-22 RX ORDER — BUTALBITAL, ACETAMINOPHEN AND CAFFEINE 50; 325; 40 MG/1; MG/1; MG/1
1 TABLET ORAL 2 TIMES DAILY PRN
Qty: 30 TABLET | Refills: 2 | Status: SHIPPED | OUTPATIENT
Start: 2020-06-22 | End: 2021-03-01 | Stop reason: SDUPTHER

## 2020-06-22 ASSESSMENT — PAIN DESCRIPTION - LOCATION: LOCATION: BACK;HEAD

## 2020-06-22 ASSESSMENT — PAIN DESCRIPTION - PAIN TYPE: TYPE: CHRONIC PAIN

## 2020-06-22 ASSESSMENT — PAIN SCALES - GENERAL: PAINLEVEL_OUTOF10: 5

## 2020-06-22 NOTE — H&P
Neurological: Positive for numbness and headaches. Negative for weakness. Psychiatric/Behavioral: Positive for sleep disturbance. Negative for agitation, self-injury and suicidal ideas. The patient is not nervous/anxious. 14 point ROS negative besides that noted in HPI    Physical exam:     No data found. Body mass index is 25.71 kg/m². Physical Exam  Vitals signs and nursing note reviewed. Constitutional:       General: She is not in acute distress. Appearance: She is well-developed. HENT:      Head: Normocephalic. Right Ear: External ear normal.      Left Ear: External ear normal.      Nose: Nose normal.   Eyes:      Conjunctiva/sclera: Conjunctivae normal.      Pupils: Pupils are equal, round, and reactive to light. Neck:      Musculoskeletal: Normal range of motion and neck supple. Cardiovascular:      Rate and Rhythm: Normal rate and regular rhythm. Pulmonary:      Effort: Pulmonary effort is normal.      Breath sounds: Normal breath sounds. Abdominal:      General: Bowel sounds are normal.      Palpations: Abdomen is soft. Musculoskeletal:      Cervical back: She exhibits tenderness, pain and spasm. Thoracic back: She exhibits tenderness, pain and spasm. Lumbar back: She exhibits tenderness, pain and spasm. Comments: Noted to have tender palpable knots noted in muscles of the cervical, thoracic and lumbar spine (trigger points identified)      Positive Bilateral  SI tenderness noted. Positive distraction, positive thigh thrust, positive Hector's Bilaterally   Skin:     General: Skin is warm and dry. Neurological:      Mental Status: She is alert and oriented to person, place, and time. Cranial Nerves: No cranial nerve deficit. Sensory: No sensory deficit. Deep Tendon Reflexes: Reflexes are normal and symmetric. Psychiatric:         Behavior: Behavior normal.         Thought Content:  Thought content normal.         Judgment: Judgment

## 2020-07-01 ENCOUNTER — HOSPITAL ENCOUNTER (OUTPATIENT)
Dept: PAIN MANAGEMENT | Age: 45
Discharge: HOME | End: 2020-07-01
Payer: MEDICARE

## 2020-07-01 VITALS
HEART RATE: 80 BPM | SYSTOLIC BLOOD PRESSURE: 121 MMHG | DIASTOLIC BLOOD PRESSURE: 70 MMHG | OXYGEN SATURATION: 100 % | TEMPERATURE: 97.3 F | RESPIRATION RATE: 16 BRPM

## 2020-07-01 PROCEDURE — 76942 ECHO GUIDE FOR BIOPSY: CPT | Performed by: NURSE PRACTITIONER

## 2020-07-01 PROCEDURE — 20611 DRAIN/INJ JOINT/BURSA W/US: CPT

## 2020-07-01 PROCEDURE — 2500000003 HC RX 250 WO HCPCS

## 2020-07-01 PROCEDURE — 20553 NJX 1/MLT TRIGGER POINTS 3/>: CPT

## 2020-07-01 PROCEDURE — 20553 NJX 1/MLT TRIGGER POINTS 3/>: CPT | Performed by: NURSE PRACTITIONER

## 2020-07-01 PROCEDURE — 6360000002 HC RX W HCPCS

## 2020-07-01 RX ORDER — BUPIVACAINE HYDROCHLORIDE 5 MG/ML
INJECTION, SOLUTION EPIDURAL; INTRACAUDAL
Status: COMPLETED | OUTPATIENT
Start: 2020-07-01 | End: 2020-07-01

## 2020-07-01 RX ORDER — LIDOCAINE HYDROCHLORIDE 10 MG/ML
INJECTION, SOLUTION EPIDURAL; INFILTRATION; INTRACAUDAL; PERINEURAL
Status: COMPLETED | OUTPATIENT
Start: 2020-07-01 | End: 2020-07-01

## 2020-07-01 RX ORDER — TRIAMCINOLONE ACETONIDE 40 MG/ML
INJECTION, SUSPENSION INTRA-ARTICULAR; INTRAMUSCULAR
Status: COMPLETED | OUTPATIENT
Start: 2020-07-01 | End: 2020-07-01

## 2020-07-01 RX ADMIN — BUPIVACAINE HYDROCHLORIDE 14 ML: 5 INJECTION, SOLUTION EPIDURAL; INTRACAUDAL at 09:40

## 2020-07-01 RX ADMIN — LIDOCAINE HYDROCHLORIDE 14 ML: 10 INJECTION, SOLUTION EPIDURAL; INFILTRATION; INTRACAUDAL; PERINEURAL at 09:42

## 2020-07-01 RX ADMIN — TRIAMCINOLONE ACETONIDE 80 MG: 40 INJECTION, SUSPENSION INTRA-ARTICULAR; INTRAMUSCULAR at 09:42

## 2020-07-01 NOTE — PROCEDURES
[facility] Physical [facility]    Patient Name: Dolores Chávez    : 1975                    Age: 54 y.o. Sex: female    Date: 2020    Pre-op Diagnosis: Myofascial Pain/ Muscle Spasms    Post-op Diagnosis: Myofascial Pain/ Muscle Spasms    Procedure: Lumbar Trigger Point Injections    Performing Procedure: Danie Wylie ACAGNP - BC; [facility]    Patient Vitals for the past 24 hrs:   BP Temp Temp src Pulse Resp SpO2   20 0937 121/70 97.3 °F (36.3 °C) Temporal 80 16 100 %       Description of Procedure:    After a brief physical assessment and failure to improve with conservative measures the patient presented for Lumbar Trigger Point Injections The indications, limitations and possible complications were discussed with the patient and the patient elected to proceed with the procedure. After voluntary, informed and signed consent obtained the patient was placed in a seated position. Appropriate time out was obtained per policy. The area of maximal tenderness was palpated over the bilaterally Lumbar Muscles - Lower Latissimus,  Erector Spinae, Lumbar Paraspinous. The skin overlying these areas was marked with a skin marker. The areas were prepped using aseptic technique with CHG prep. The skin overlying the proposed injection sites were then sprayed with Gebauer's Solution while protecting patient eyes. Each trigger point of the Lumbar Muscles - Lower Latissimus,  Erector Spinae, Lumbar Paraspinous was injected after negative aspiration was injected with approximately 1-2 ml of a solution of 5 ml of 1% Lidocaine Plain and 5 ml of 0.5% Marcaine Plain after negative aspiration    Discharge: The patient tolerated the procedure well. There were no complications during the procedure and the patient was discharged home with discharge instructions.     The patient has been instructed to contact the office should there be any complications or questions to arise between today and their next appointment. Plan: Will return to the office in 6 weeks for follow up    Teddy Jeffers, AMNA - CNP, 2020 at 9:50 AM     [facility] Physical [facility] Pain Medicine    Patient Name: Tanmay Back    : 1975    Age: 54 y.o. Sex: female    Date: 2020    Pre-op Diagnosis: bilateral  Sacroiliac Joint(s) Dysfunction/ Sacroiliitis    Post-op Diagnosis: bilateral  Sacroiliac Joint(s) Dysfunction/ Sacroliliits    Procedure: Ultrasound Guided Injection of  bilateral Sacroiliac Joint(s)     Performing Procedure:  Willis Sluder, ACAGNP - BC, [facility]    Patient Vitals for the past 24 hrs:   BP Temp Temp src Pulse Resp SpO2   20 0937 121/70 97.3 °F (36.3 °C) Temporal 80 16 100 %       bilateral  Sacroiliac Joint(s)     Description of Procedure:    After voluntary, informed and signed consent obtained the patient was placed in a prone position. Appropriate time out was obtained per policy. The Sacroiliac Joint(s) was palpated for area of maximal tenderness. The area was prepped in an aseptic fashion with CHG swab. The ultrasound transducer was used to confirm the appropriate location. The skin was sprayed with Gebauer's Solution. Under aseptic technique and direct ultrasound visualization a 22 gauge 3 inch spinal needle was introduced into the Sacroiliac Joint(s). After a negative aspiration, a solution of 2 ml of 0.5% Marcaine Plain and 2 ml of 1% Lidocaine Plain and 1 ml of Kenalog (40 mg/ml) was injected into the Sacroiliac Joint(s). The needle was withdrawn and a sterile dressing applied. If this was a bilateral procedure, the same steps were followed on the opposite side. Discharge: The patient tolerated the procedure well. There were no complications during the procedure and the patient was discharged home with discharge instructions.     The patient has been instructed to contact the office should there be any complications or questions to arise between today and their next appointment. Plan:    The patient will follow up in the office in approximately 6 weeks.      AMNA Aguillon - CNP, 7/1/2020 at 9:50 AM

## 2020-07-09 ENCOUNTER — TELEPHONE (OUTPATIENT)
Dept: PAIN MANAGEMENT | Age: 45
End: 2020-07-09

## 2020-07-15 ENCOUNTER — HOSPITAL ENCOUNTER (OUTPATIENT)
Dept: PAIN MANAGEMENT | Age: 45
Discharge: HOME | End: 2020-07-15
Payer: MEDICARE

## 2020-07-15 VITALS
RESPIRATION RATE: 18 BRPM | HEART RATE: 88 BPM | DIASTOLIC BLOOD PRESSURE: 72 MMHG | SYSTOLIC BLOOD PRESSURE: 144 MMHG | TEMPERATURE: 97.4 F

## 2020-07-15 PROCEDURE — 20553 NJX 1/MLT TRIGGER POINTS 3/>: CPT | Performed by: NURSE PRACTITIONER

## 2020-07-15 PROCEDURE — 20553 NJX 1/MLT TRIGGER POINTS 3/>: CPT

## 2020-07-15 PROCEDURE — 2500000003 HC RX 250 WO HCPCS

## 2020-07-15 RX ORDER — LIDOCAINE HYDROCHLORIDE 10 MG/ML
15 INJECTION, SOLUTION EPIDURAL; INFILTRATION; INTRACAUDAL; PERINEURAL ONCE
Status: DISCONTINUED | OUTPATIENT
Start: 2020-07-15 | End: 2020-07-17 | Stop reason: HOSPADM

## 2020-07-15 RX ORDER — BUPIVACAINE HYDROCHLORIDE 5 MG/ML
15 INJECTION, SOLUTION EPIDURAL; INTRACAUDAL ONCE
Status: DISCONTINUED | OUTPATIENT
Start: 2020-07-15 | End: 2020-07-17 | Stop reason: HOSPADM

## 2020-07-15 ASSESSMENT — PAIN DESCRIPTION - PROGRESSION: CLINICAL_PROGRESSION: OTHER (COMMENT)

## 2020-07-15 ASSESSMENT — PAIN DESCRIPTION - ORIENTATION: ORIENTATION: MID

## 2020-07-15 ASSESSMENT — PAIN DESCRIPTION - FREQUENCY: FREQUENCY: CONTINUOUS

## 2020-07-15 ASSESSMENT — PAIN DESCRIPTION - PAIN TYPE: TYPE: CHRONIC PAIN

## 2020-07-15 ASSESSMENT — PAIN DESCRIPTION - DESCRIPTORS: DESCRIPTORS: ACHING

## 2020-07-15 ASSESSMENT — PAIN DESCRIPTION - ONSET: ONSET: AWAKENED FROM SLEEP

## 2020-07-15 ASSESSMENT — PAIN SCALES - GENERAL: PAINLEVEL_OUTOF10: 6

## 2020-07-15 ASSESSMENT — PAIN DESCRIPTION - LOCATION: LOCATION: NECK;HEAD;BACK

## 2020-07-15 NOTE — PROCEDURES
[facility]    Patient Name: Dave Austin    : 1975                    Age: 54 y.o. Sex: female    Date: July 15, 2020    Pre-op Diagnosis: Myofascial Pain/ Muscle Spasms/ Cervicalgia    Post-op Diagnosis: Myofascial Pain/ Muscle Spasms/ Cervicalgia    Procedure: Cervical Trigger Point Injections    Performing Procedure: SANDRO Rowe, VA-BC    Patient Vitals for the past 24 hrs:   BP Temp Temp src Pulse Resp   07/15/20 0947 (!) 144/72 97.4 °F (36.3 °C) Temporal 88 18       Description of Procedure:    After a brief physical assessment and failure to improve with conservative measures the patient presented for Cervical Trigger Point Injections The indications, limitations and possible complications were discussed with the patient and the patient elected to proceed with the procedure. After voluntary, informed and signed consent obtained the patient was placed in a seated position. Appropriate time out was obtained per policy. The area of maximal tenderness was palpated over the  bilaterally Cervical muscles - Splenius  Trapezius  Rhomboid The skin overlying these areas was marked with a skin marker. The skin overlying the proposed injection sites were then sprayed with Gebauer's Solution while protecting patient eyes. The areas were prepped using aseptic technique with CHG prep.  Each trigger point of the Cervical muscles - Splenius  Trapezius  Rhomboid was injected with approximately 1- 2 ml of a solution of 5 ml of 1% Lidocaine Plain and 5 ml of 0.5% Marcaine Plain after negative aspiration    Pre-op Diagnosis: Myofascial Pain/ Muscle Spasms    Post-op Diagnosis: Myofascial Pain/ Muscle Spasms    Procedure: Thoracic Trigger Point Injections     Performing Procedure: SANDRO Rowe, VA-BC    Patient Vitals for the past 24 hrs:   BP Temp Temp src Pulse Resp   07/15/20 0947 (!) 144/72 97.4 °F (36.3 °C) Temporal 88 18 Description of Procedure:    After a brief physical assessment and failure to improve with conservative measures the patient presented for Thoracic Trigger Point Injections The indications, limitations and possible complications were discussed with the patient and the patient elected to proceed with the procedure. After voluntary, informed and signed consent obtained the patient was placed in a seated position. Appropriate time out was obtained per policy. The areas were then prepped in a sterile fashion with Chloro-Prep. The area of maximal tenderness was palpated over the bilaterally Thoracic Muscles - Erector Spinae, Upper/Mid Latissimus, Rhomboid Minor, Rhomboid Major. The skin overlying these areas was marked with a skin marker. The areas were prepped using aseptic technique with CHG prep. The skin overlying the proposed injection sites were then sprayed with Gebauer's Solution while protecting patient eyes. Each trigger point of the Thoracic Muscles - Erector Spinae, Upper/Mid Latissimus, Rhomboid Minor, Rhomboid Major was injected after negative aspiration was injected with approximately 1-2 ml of a solution of 5 ml of 1% Lidocaine Plain and 5 ml of 0.5% Marcaine Plain after negative aspiration    Discharge: The patient tolerated the procedure well. There were no complications during the procedure and the patient was discharged home with discharge instructions. The patient has been instructed to contact the office should there be any complications or questions to arise between today and their next appointment. Plan:  Patient to follow up as office appointment in 6 weeks.      AMNA Blake CNP, 7/15/2020 at 10:04 AM

## 2020-07-24 ASSESSMENT — ENCOUNTER SYMPTOMS
NAUSEA: 1
VOMITING: 1
CONSTIPATION: 0
BACK PAIN: 1
PHOTOPHOBIA: 1

## 2020-08-05 RX ORDER — HYDROCODONE BITARTRATE AND ACETAMINOPHEN 5; 325 MG/1; MG/1
1 TABLET ORAL EVERY 8 HOURS PRN
Qty: 60 TABLET | Refills: 0 | Status: SHIPPED | OUTPATIENT
Start: 2020-08-05 | End: 2020-09-14 | Stop reason: SDUPTHER

## 2020-08-06 ENCOUNTER — TELEPHONE (OUTPATIENT)
Dept: PAIN MANAGEMENT | Age: 45
End: 2020-08-06

## 2020-08-06 NOTE — TELEPHONE ENCOUNTER
Patient called to request if Landen Frank can prescribe something for nausea. She stated that she has always had some nausea present with migraine, but nothing that has prevented her from eating. She said that it has started since her last office appointment. and now reports significant weight loss and is not able to relieve the nausea. She said that she has taken Zofran ODT in the past and it has helped. I let her know that I will speak to Landen Frank to see if she will prescribe, or if she should be seen by general practitioner. Patient verbalized understanding.

## 2020-08-09 RX ORDER — ONDANSETRON 8 MG/1
8 TABLET, ORALLY DISINTEGRATING ORAL EVERY 8 HOURS PRN
Qty: 90 TABLET | Refills: 2 | Status: SHIPPED | OUTPATIENT
Start: 2020-08-09 | End: 2020-11-07

## 2020-09-02 ENCOUNTER — HOSPITAL ENCOUNTER (OUTPATIENT)
Dept: PAIN MANAGEMENT | Age: 45
Discharge: HOME | End: 2020-09-02
Payer: MEDICARE

## 2020-09-02 VITALS
HEART RATE: 84 BPM | OXYGEN SATURATION: 100 % | DIASTOLIC BLOOD PRESSURE: 74 MMHG | SYSTOLIC BLOOD PRESSURE: 125 MMHG | RESPIRATION RATE: 18 BRPM | TEMPERATURE: 98 F

## 2020-09-02 PROCEDURE — 64615 CHEMODENERV MUSC MIGRAINE: CPT

## 2020-09-02 PROCEDURE — 2580000003 HC RX 258

## 2020-09-02 PROCEDURE — 6360000002 HC RX W HCPCS

## 2020-09-02 PROCEDURE — 64615 CHEMODENERV MUSC MIGRAINE: CPT | Performed by: NURSE PRACTITIONER

## 2020-09-03 PROCEDURE — 6360000002 HC RX W HCPCS

## 2020-09-04 ENCOUNTER — TELEMEDICINE (OUTPATIENT)
Dept: NEUROSURGERY | Age: 45
End: 2020-09-04
Payer: MEDICARE

## 2020-09-04 PROCEDURE — G8427 DOCREV CUR MEDS BY ELIG CLIN: HCPCS | Performed by: NURSE PRACTITIONER

## 2020-09-04 PROCEDURE — 99213 OFFICE O/P EST LOW 20 MIN: CPT | Performed by: NURSE PRACTITIONER

## 2020-09-04 NOTE — PROCEDURES
[address]    Patient Name: Kristopher Cardoso    : 1975                    Age: 54 y.o. Sex: female    Date: 2020    Pre-op Diagnosis: Chronic Migraines    Post-op Diagnosis: Chronic Migraines    Procedure: Botox injections x 155 units (45 units wasted)    Performing Procedure: AMNA Mcgee    No data found. Previous Injections:  Patient had 3 headaches/migraines over the past month. Indications:    Kristopher Cardoso has been treated for problems with chronic migraine headaches. The patient was taken through a conservative course of treatment without complete resolution of symptoms. Botox injection therapy was offered and after the risks and benefits of the procedure were explained to the patient, we had agreed to proceed. The patient was taken to the procedure room and placed in the seated position. The following muscles were identified using palpation and standard landmarks. These muscles were marked and prepped with alcohol. A total of 155 units were injected after careful aspiration and the following distribution bilaterally:  10 units in 2 sites, procerus 5 units in one site, frontalis 20 units in 4 sites, temporalis 40 unit in 8 sites, occipitals 30 units in 6 sites, cervical paraspinals 20 units in 4 sites, and trapezius 30 units in 6 sites. Discharge: The patient tolerated the procedure well. There were no complications during the procedure and the patient was discharged home with discharge instructions. The patient has been instructed to contact the office should there be any complications or questions to arise between today and their next appointment. Plan: Will return to the office for office visit in 4 - 6 weeks for follow up and office visit.     AMNA Rodriguez - CNP, 9/3/2020 at 8:35 PM

## 2020-09-04 NOTE — PROGRESS NOTES
[id number] [facility] Neurology Virtual Visit Note    2020    TELEHEALTH EVALUATION -- Audio/Visual (During [id number] public health emergency)      Patient:   Annia Ramos  MR#:    [id number]  Account Number:                         YOB: 1975  Date of Evaluation:  2020  Time of Note:                          3:13 PM  Primary/Referring Physician:  Leonetta Bosworth, PA   Consulting Physician:  AMNA Desai     FOLLOW UP    Chief Complaint   Patient presents with    Follow-up     Patient is located at home  Also present during this call is no one   Provider is located at [place] [address]    HPI:    Annia Ramos (:  1975) has requested an audio/video evaluation for the following concern(s):    Here for follow up of headaches. She is no longer taking Emgality, noted more nausea and some weight loss with this. No worsening of headaches although. Noting about 4-5 migraines in a month, other more mild headaches noted as well. No change in characteristics of headaches. Headache pain starts in the base her neck bilaterally with occasional radiation to bilateral frontal areas. She does note light or sound sensitivity or nausea and vomiting. Headaches can last for several hours to all day. Intermittent pulsatile tinnitus with some headaches. Dizziness at times. At times will have black spots in her vision that occur with and without the headache. Denies bruce visual loss. She does note intermittent numbness and tingling in BUE, no worsening of this. She is getting Botox through pain management as well as ONB and TPIs. She is taking [place] and this is beneficial most of the time. She takes Fioricet prn as well. She previously failed Aimovig r/t side effects. Has tried and failed Topamax, Amitriptyline, Propranolol, Imitrex. She has tried supplementing with magnesium, feverfew and butterbur as well. She has a history of chiari malformation decompression in . Headaches did initially improve after surgery. No other complaints. REVIEW OF SYSTEMS    Constitutional: []? Fever []? Sweat []? Chills []? Recent Injury [x]? Denies all unless marked  HEENT:[]? Headache  []? Head Injury/Hearing Loss  []? Sore Throat  []? Ear Ache/Dizziness  [x]? Denies all unless marked  Spine:  []? Neck pain  []? Back pain  []? Sciaticia  [x]? Denies all unless marked  Cardiovascular:[]? Heart Disease []? Chest Pain []? Palpitations  [x]? Denies all unless marked  Pulmonary: []? Shortness of Breath []? Cough   [x]? Denies all unless marke  Gastrointestinal: []? Nausea  []? Vomiting  []? Abdominal Pain  []? Constipation  []? Diarrhea  []? Dark Bloody Stools  [x]? Denies all unless marked  Psychiatric/Behavioral:[]? Depression []? Anxiety [x]? Denies all unless marked  Genitourinary:   []? Frequency  []? Urgency  []? Incontinence []? Pain with Urination  [x]? Denies all unless marked  Extremities: []? Pain  []? Swelling  [x]? Denies all unless marked  Musculoskeletal: []? Muscle Pain  []? Joint Pain  []? Arthritis []? Muscle Cramps []? Muscle Twitches  [x]? Denies all unless marked  Sleep: []? Insomnia []? Snoring []? Restless Legs []? Sleep Apnea  []? Daytime Sleepiness  [x]? Denies all unless marked  Skin:[]? Rash []? Skin Discoloration [x]? Denies all unless marked   Neurological: []? Visual Disturbance/Memory Loss []? Loss of Balance []? Slurred Speech/Weakness []? Seizures  []? Vertigo/Dizziness [x]? Denies all unless marked    The MA has completed the ROS with the patient. I have reviewed it in its' entirety with the patient and agree with the documentation.      Past Medical History:   Diagnosis Date    ADHD (attention deficit hyperactivity disorder)     Anemia     Anxiety     Cervicalgia     Chiari malformation type I ([facility] [address]     s/p decompression 2014    Chronic back pain     Depression     Headache(784.0)     Hiatal hernia     Hyperlipidemia     Indigestion     Obstructive sleep apnea     Mild with an AHI:  7.5 and  RDI:  14.7       Past Surgical History:   Procedure Laterality Date    BREAST BIOPSY  may 2014    Left     SECTION      x2    CHOLECYSTECTOMY      OTHER SURGICAL HISTORY      Posterior Fossa Decompression in [place]       Family History   Problem Relation Age of Onset    High Blood Pressure Father     High Blood Pressure Sister     Cancer Maternal Aunt         breast    Cancer Other         maternal great aunt-breast       Social History     Socioeconomic History    Marital status:      Spouse name: Not on file    Number of children: Not on file    Years of education: Not on file    Highest education level: Not on file   Occupational History    Not on file   Social Needs    Financial resource strain: Not on file    Food insecurity     Worry: Not on file     Inability: Not on file    Transportation needs     Medical: Not on file     Non-medical: Not on file   Tobacco Use    Smoking status: Never Smoker    Smokeless tobacco: Never Used   Substance and Sexual Activity    Alcohol use: No    Drug use: No    Sexual activity: Yes     Partners: Male   Lifestyle    Physical activity     Days per week: Not on file     Minutes per session: Not on file    Stress: Not on file   Relationships    Social connections     Talks on phone: Not on file     Gets together: Not on file     Attends Baptism service: Not on file     Active member of club or organization: Not on file     Attends meetings of clubs or organizations: Not on file     Relationship status: Not on file    Intimate partner violence     Fear of current or ex partner: Not on file     Emotionally abused: Not on file     Physically abused: Not on file     Forced sexual activity: Not on file   Other Topics Concern    Not on file   Social History Narrative    Not on file       Current Outpatient Medications   Medication Sig Dispense Refill    ondansetron (ZOFRAN-ODT) 8 MG TBDP disintegrating tablet Place 1 tablet under the tongue every 8 hours as needed for Nausea or Vomiting 90 tablet 2    HYDROcodone-acetaminophen (NORCO) 5-325 MG per tablet Take 1 tablet by mouth every 8 hours as needed for Pain for up to 30 days. 60 tablet 0    butalbital-acetaminophen-caffeine (FIORICET, ESGIC) -40 MG per tablet Take 1 tablet by mouth 2 times daily as needed for Headaches or Migraine 30 tablet 2    Ubrogepant (UBRELVY) 100 MG TABS Take 100 mg by mouth daily as needed (migraines) 10 tablet 2    VYVANSE 30 MG capsule Take 1 capsule by mouth daily.  ibuprofen (ADVIL;MOTRIN) 800 MG tablet Take 1 tablet by mouth 3 times daily as needed for Pain 90 tablet 5    lidocaine (LIDODERM) 5 % Place 3 patches onto the skin daily 12 hours on, 12 hours off. 30 patch 2    ALPRAZolam (XANAX) 0.5 MG tablet Take 1 tablet by mouth 2 times daily as needed. .  2    zolpidem (AMBIEN) 10 MG tablet Take 1 tablet by mouth nightly. .  2    buPROPion (WELLBUTRIN XL) 300 MG extended release tablet Take 1 tablet by mouth daily  2    lamoTRIgine (LAMICTAL) 100 MG tablet Take 2.5 tablets by mouth nightly  2     No current facility-administered medications for this visit.         Allergies   Allergen Reactions    Ultram [Tramadol] Hives       PHYSICAL EXAMINATION:    Constitutional -   General appearance: No acute distress   EYES -   Conjunctiva normal  ENT-    No scars, masses, or lesions over external nose or ears  Cardiovascular -   No clubbing, cyanosis, or edema   Pulmonary-   Good expansion, normal effort without use of accessory muscles  Musculoskeletal -   No significant wasting of muscles noted  Gait as below, see gait exam in the neurologic exam  No gross bony deformities  Skin -   No rash, erythema, or pallor  Psychiatric -   Mood, affect, and behavior appear normal    Memory as below see mental status examination in the neurologic exam    NEUROLOGICAL EXAM    Mental status   [x]Awake, alert, oriented   [x]Affect in these; no evidence of NF narrowing or spinal stenosis.      Reviewed pain management records     ASSESSMENT:    Rosa Muse is a 54y.o. year old female evaluated via Telehealth encounter for follow up of headaches. Doing about the same since last visit. No change in headache characteristics or exam. Prior repeat MRI brain and cervical spine were unremarkable. Numbness in BUE is intermittent, could consider NCS with any worsening but felt low yield today. No longer taking Emgality, failed [address] previously as well. Getting Botox, ONB and TPIs through pain management which has been beneficial.      Diagnosis Orders   1. Chronic nonintractable headache, unspecified headache type     2. History of Chiari malformation          PLAN:  1. Continue Botox, TPIs and ONB with pain management   2. Continue Ubrelvy prn. Discussed side effects with patient. 3. Continue Fioricet prn. Discussed limiting use of this to avoid rebound/medication overuse headaches   4. Follow up in 6 months, sooner with any worsening. An  electronic signature was used to authenticate this note. AMNA Yates       Pursuant to the emergency declaration under the [facility] waiver authority and the [employer] Act, this Virtual  Visit was conducted, with patient's consent, to reduce the patient's risk of exposure to COVID-19 and provide continuity of care for an established patient. Services were provided through a video synchronous discussion virtually to substitute for in-person clinic visit.

## 2020-09-09 ENCOUNTER — TELEPHONE (OUTPATIENT)
Dept: PAIN MANAGEMENT | Age: 45
End: 2020-09-09

## 2020-09-14 RX ORDER — HYDROCODONE BITARTRATE AND ACETAMINOPHEN 5; 325 MG/1; MG/1
1 TABLET ORAL EVERY 8 HOURS PRN
Qty: 60 TABLET | Refills: 0 | Status: SHIPPED | OUTPATIENT
Start: 2020-09-14 | End: 2020-10-27 | Stop reason: SDUPTHER

## 2020-09-14 RX ORDER — UBROGEPANT 100 MG/1
100 TABLET ORAL DAILY PRN
Qty: 10 TABLET | Refills: 2 | Status: SHIPPED | OUTPATIENT
Start: 2020-09-14 | End: 2022-04-28 | Stop reason: ALTCHOICE

## 2020-09-14 NOTE — TELEPHONE ENCOUNTER
Patient called on 9/11/20. The office is closed on Fridays. Patient has an appointment on 11/10/20 with [facility].

## 2020-10-29 RX ORDER — HYDROCODONE BITARTRATE AND ACETAMINOPHEN 5; 325 MG/1; MG/1
1 TABLET ORAL EVERY 8 HOURS PRN
Qty: 60 TABLET | Refills: 0 | Status: SHIPPED | OUTPATIENT
Start: 2020-10-29 | End: 2020-11-10 | Stop reason: SDUPTHER

## 2020-10-29 RX ORDER — IBUPROFEN 800 MG/1
800 TABLET ORAL 3 TIMES DAILY PRN
Qty: 90 TABLET | Refills: 2 | Status: SHIPPED | OUTPATIENT
Start: 2020-10-29 | End: 2021-09-09 | Stop reason: SDUPTHER

## 2020-11-10 ENCOUNTER — HOSPITAL ENCOUNTER (OUTPATIENT)
Dept: PAIN MANAGEMENT | Age: 45
Discharge: HOME | End: 2020-11-10
Payer: MEDICARE

## 2020-11-10 VITALS
SYSTOLIC BLOOD PRESSURE: 131 MMHG | HEIGHT: 63 IN | OXYGEN SATURATION: 95 % | HEART RATE: 83 BPM | BODY MASS INDEX: 23.78 KG/M2 | TEMPERATURE: 99.5 F | DIASTOLIC BLOOD PRESSURE: 80 MMHG | WEIGHT: 134.2 LBS

## 2020-11-10 PROCEDURE — 99213 OFFICE O/P EST LOW 20 MIN: CPT | Performed by: NURSE PRACTITIONER

## 2020-11-10 PROCEDURE — 99213 OFFICE O/P EST LOW 20 MIN: CPT

## 2020-11-10 RX ORDER — METHOCARBAMOL 750 MG/1
750 TABLET, FILM COATED ORAL 3 TIMES DAILY
Qty: 90 TABLET | Refills: 2 | Status: SHIPPED | OUTPATIENT
Start: 2020-11-10 | End: 2021-02-08

## 2020-11-10 RX ORDER — HYDROCODONE BITARTRATE AND ACETAMINOPHEN 5; 325 MG/1; MG/1
1 TABLET ORAL EVERY 8 HOURS PRN
Qty: 60 TABLET | Refills: 0 | Status: SHIPPED | OUTPATIENT
Start: 2020-12-28 | End: 2021-02-09 | Stop reason: SDUPTHER

## 2020-11-10 RX ORDER — HYDROCODONE BITARTRATE AND ACETAMINOPHEN 5; 325 MG/1; MG/1
1 TABLET ORAL EVERY 8 HOURS PRN
Qty: 60 TABLET | Refills: 0 | Status: SHIPPED | OUTPATIENT
Start: 2020-11-28 | End: 2021-03-01

## 2020-11-10 ASSESSMENT — ENCOUNTER SYMPTOMS
BACK PAIN: 1
CONSTIPATION: 0

## 2020-11-10 ASSESSMENT — PAIN DESCRIPTION - PAIN TYPE: TYPE: CHRONIC PAIN

## 2020-11-10 ASSESSMENT — PAIN DESCRIPTION - LOCATION: LOCATION: BACK;HEAD;NECK

## 2020-11-10 ASSESSMENT — PAIN SCALES - GENERAL: PAINLEVEL_OUTOF10: 4

## 2020-11-10 NOTE — PROGRESS NOTES
Intake Documentation    Reason for visit: Follow up procedure    ED visits since last seen: no    Any falls since last seen: no    Have you had any neck or back surgeries: no    Have you had injections in the past: yes     Was Percentage of Pain Relief after Bilateral Sacroiliac Joint Injections with Ultrasound and Lumbar Trigger Point Injections ( 3 or more muscles)   7/1/20 :  80 %    How long lasted:  6 weeks Yes  Were you able to decrease pain medication after treatment? Yes  Were you able to increase activities of daily living after treatment? Yes  Did you have any adverse reactions to treatment? No      Was Percentage of Pain Relief after Cervial & Thoracic Trigger Point Injections ( 3 or more muscles)  7/15/20 :  80 %    How long lasted:  6 weeks Yes  Were you able to decrease pain medication after treatment? Yes  Were you able to increase activities of daily living after treatment? Yes  Did you have any adverse reactions to treatment? No    Was Percentage of Pain Relief after Botox A Injections for Migraines  9/2/20 :  80 %    How long lasted:  6 weeks Yes  Were you able to decrease pain medication after treatment? Yes  Were you able to increase activities of daily living after treatment? Yes  Did you have any adverse reactions to treatment?  No            Assessment Tools:    PEG Score: 3.6    Last PEG Score: 2.6    Annual ORT Score: 4    Annual PHQ Score: 5    Last UDS Results: Today    Education Provided:  [x] Review of Julian  [] Agreement Review  [x] PEG Score Calculated [] PHQ Score Calculated [] ORT Score Calculated    [] Compliance Issues Discussed [] Cognitive Behavior Needs [x] Exercise [] Review of Test [] Financial Issues  [x] Tobacco/Alcohol Use Reviewed [x] Teaching [] New Patient [] Picture Obtained    Physician Plan:  [] Outgoing Referral  [] Pharmacy Consult  [] Test Ordered [x] Prescription Ordered/Changed [] Blood Thinner Request Form  [] Obtained Test Results / Consult Notes  [] UDS due at next visit, verified per EPIC      [] Suspected Physical Abuse or Suicide Risk assessed - IF YES COMPLETE QUESTIONS BELOW    If any of the following questions are answered yes - contact attending physician for referral:    Has been considering harming self to escape stress, pain problems? [] YES  [] NO  Has a suicide plan? [] YES  [] NO  Has attempted suicide in the past?   [] YES  [] NO  Has a close friend or family member who committed suicide?   [] YES  [] NO    Assessment Completed by:  Electronically signed by Angelica Lehman on 11/10/2020 at 3:00 PM

## 2020-11-10 NOTE — PROGRESS NOTES
[facility] Physical & Pain Medicine    Office Visit    Patient Name: Debby Quintana    MR #: [id number]    Account [de-identified]    : 1975    Age: 54 y.o. Sex: female    Date: 11/10/2020    PCP: CALI Godwin    Chief Complaint:   Chief Complaint   Patient presents with    Headache    Lower Back Pain    Neck Pain       History of Present Illness: The patient is a 54 y.o. female who presents for procedure follow-up. Patient had bilateral SI joint injections with lumbar trigger points on 2020, patient had cervical and thoracic trigger points on 7/15/2020, and patient had Botox injections on 2020. Patient had at least 85% relief of pain from procedure(s) for at least 6 weeks and was able to increase activity after procedure. Patient received enough pain relief from injections that the patient would like to repeat the injection(s). Back Pain   This is a chronic problem. The current episode started more than 1 year ago. The problem occurs intermittently. The problem has been waxing and waning since onset. The pain is present in the lumbar spine and sacro-iliac. The quality of the pain is described as cramping and aching. The symptoms are aggravated by bending, sitting and standing. Migraine   This is a chronic problem. The current episode started more than 1 year ago. The problem occurs intermittently. The problem has been waxing and waning (depending on cycle of botox). Associated symptoms include arthralgias and myalgias. Screening Tools:    PEG Score: 3.6     Last PEG Score: 6     Annual ORT Score: 4     Annual PHQ Score: 5     Current Pain Assessment  Pain Assessment  Pain Assessment: 0-10  Pain Level: 4  Patient's Stated Pain Goal: 2  Pain Type: Chronic pain  Pain Location: Back, Head, Neck    Past Visit HPI:  2020  presents to the office for annual exam with primary complaints of chronic headaches and back pain.  Patient was referred to this office last April 2018 with a past history of chiari malformation. Patient had a posterior fossa decompression and her pain had pretty much resolved for approximately 3 years. However during the year prior to coming back to this office patient started developing pain again. She started having chronic headaches, along with myofascial pain in the shoulders and thoracic spine. Through the years patient has been on numerous medications to treat headaches including the following but not limited to Topamax, amitriptyline, propranolol, Imitrex, gabapentin, and Lyrica, Fioricet does help with the intense headaches but he does not make headaches tolerable. She does follow with neurology and she has been started on Aimovig which helps. Patient had been getting bilateral occipital nerve blocks along with trigger point injections in her cervical, thoracic, and lumbar spine. Patient was changed to botox injections in October 2019. Patient has had excellent results from botox injections. Patient did have a flare up of low back pain. She has had SI injections which have made low back pain improve. Past Medical History  Past Medical History:   Diagnosis Date    ADHD (attention deficit hyperactivity disorder)     Anemia     Anxiety     Cervicalgia     Chiari malformation type I ([facility] [address]     s/p decompression 2014    Chronic back pain     Depression     Headache(784.0)     Hiatal hernia     Hyperlipidemia     Indigestion     Obstructive sleep apnea     Mild with an AHI:  7.5 and  RDI:  14.7       Medications  Current Outpatient Medications   Medication Sig Dispense Refill    HYDROcodone-acetaminophen (NORCO) 5-325 MG per tablet Take 1 tablet by mouth every 8 hours as needed for Pain for up to 30 days.  60 tablet 0    ibuprofen (ADVIL;MOTRIN) 800 MG tablet Take 1 tablet by mouth 3 times daily as needed for Pain 90 tablet 2    Ubrogepant (UBRELVY) 100 MG TABS Take 100 mg by mouth daily as needed (migraines) 10 tablet 2    Social History     Socioeconomic History    Marital status:      Spouse name: None    Number of children: None    Years of education: None    Highest education level: None   Occupational History    None   Social Needs    Financial resource strain: None    Food insecurity     Worry: None     Inability: None    Transportation needs     Medical: None     Non-medical: None   Tobacco Use    Smoking status: Never Smoker    Smokeless tobacco: Never Used   Substance and Sexual Activity    Alcohol use: No    Drug use: No    Sexual activity: Yes     Partners: Male   Lifestyle    Physical activity     Days per week: None     Minutes per session: None    Stress: None   Relationships    Social connections     Talks on phone: None     Gets together: None     Attends Islam service: None     Active member of club or organization: None     Attends meetings of clubs or organizations: None     Relationship status: None    Intimate partner violence     Fear of current or ex partner: None     Emotionally abused: None     Physically abused: None     Forced sexual activity: None   Other Topics Concern    None   Social History Narrative    None         Family History  family history includes Cancer in her maternal aunt and another family member; High Blood Pressure in her father and sister. Review of Systems:  Review of Systems   Constitutional: Positive for activity change. Eyes: Positive for visual disturbance. Gastrointestinal: Negative for constipation. Musculoskeletal: Positive for arthralgias, back pain and myalgias. Psychiatric/Behavioral: Positive for sleep disturbance. Negative for agitation, self-injury and suicidal ideas. The patient is not nervous/anxious.          14 point ROS negative besides that noted in HPI    Physical exam:     Patient Vitals for the past 24 hrs:   BP Temp Temp src Pulse SpO2 Height Weight   11/10/20 1522 131/80 99.5 °F (37.5 °C) Temporal 83 95 % 5' 3\" (1.6 m) 134 lb 3.2 oz (60.9 kg)       Body mass index is 23.77 kg/m². Physical Exam  Vitals signs and nursing note reviewed. Constitutional:       General: She is not in acute distress. Appearance: She is well-developed. HENT:      Head: Normocephalic. Right Ear: External ear normal.      Left Ear: External ear normal.      Nose: Nose normal.   Eyes:      Conjunctiva/sclera: Conjunctivae normal.      Pupils: Pupils are equal, round, and reactive to light. Neck:      Musculoskeletal: Normal range of motion and neck supple. Cardiovascular:      Rate and Rhythm: Normal rate and regular rhythm. Pulmonary:      Effort: Pulmonary effort is normal.      Breath sounds: Normal breath sounds. Abdominal:      General: Bowel sounds are normal.      Palpations: Abdomen is soft. Musculoskeletal:      Cervical back: She exhibits tenderness, pain and spasm. Thoracic back: She exhibits tenderness, pain and spasm. Lumbar back: She exhibits tenderness, pain and spasm. Comments: Noted to have tender palpable knots noted in muscles of the cervical, thoracic and lumbar spine (trigger points identified)      Positive Bilateral  SI tenderness noted. Positive distraction, positive thigh thrust, positive Hector's Bilaterally   Skin:     General: Skin is warm and dry. Neurological:      Mental Status: She is alert and oriented to person, place, and time. Cranial Nerves: No cranial nerve deficit. Sensory: No sensory deficit. Deep Tendon Reflexes: Reflexes are normal and symmetric. Psychiatric:         Behavior: Behavior normal.         Thought Content:  Thought content normal.         Judgment: Judgment normal.         LABS:     Lab Results   Component Value Date     08/27/2011    K 4.0 08/27/2011     08/27/2011    CO2 32 08/27/2011    BUN 10 08/27/2011    CREATININE 0.8 08/27/2011    GLUCOSE 94 08/27/2011    CALCIUM 9.3 08/27/2011        Lab Results   Component Value Date    WBC 6.87 08/27/2011    HGB 12.6 08/27/2011    HCT 38.0 08/27/2011    MCV 91.1 08/27/2011     08/27/2011       Assessment:                                                                                                                                        Active Problems:    Restless leg syndrome    Chiari malformation type I (HCC)    Intractable migraine without aura and without status migrainosus    Myofascial pain    Bilateral occipital neuralgia    SI (sacroiliac) joint dysfunction    Chronic bilateral low back pain without sciatica    Pain management contract agreement  Resolved Problems:    * No resolved hospital problems. *      PLAN:  Myofascial pain  - methocarbamol (ROBAXIN-750) 750 MG tablet; Take 1 tablet by mouth 3 times daily  Dispense: 90 tablet; Refill: 2    Chronic bilateral low back pain without sciatica  - HYDROcodone-acetaminophen (NORCO) 5-325 MG per tablet; Take 1 tablet by mouth every 8 hours as needed for Pain for up to 30 days. Take lowest dose possible to manage pain  Dispense: 60 tablet; Refill: 0    - HYDROcodone-acetaminophen (NORCO) 5-325 MG per tablet; Take 1 tablet by mouth every 8 hours as needed for Pain for up to 30 days. Take lowest dose possible to manage pain  Dispense: 60 tablet; Refill: 0    Due to upcoming holiday and provider's surgery, patient's next two month prescription e-scribed with appropriate fill dates according to YANCY report with making date adjustment for weekend fills. Reschedule  Botox for Migraines. Patient to continue with Neurology. Continue Motrin no refill needed patient takes sparingly    Continue Fioricet 1 tablet BID prn # 30 with no refill needed      Continue Ubrelvy 100 mg 9 doses per month. Patient used sample with good results.      Schedule cervical and thoracic trigger point injections as patient has had good response in the past.    Schedule bilateral lumbar trigger point injections as patient has had good response with those in the past.    Consider repeating bilateral SI injections in the future as patient does not feel they need to be repeated at this time as they are still effective. [x] Follow up    [] 4 weeks   [x] 6-8 weeks   [] 10-12 weeks   [] 3 months  [x] Post procedure to evaluate effectiveness of treatment  [] To evaluate medications changes made at office visit. [] To review diagnostics ordered at last visit. [] To evaluate response to therapy    [x] For management of controlled substance  [x] Random UDS as indicated by ORT score or if indicated by abberent behaviors    Discussion: Discussed exam findings and plan of care with patient. Patient agreed with POC and questions were asked and answered. Activity: discussed exercise as beneficial to pain reduction, encouraged stretching exercise with a focus on torso strengthening. Goals:  Pain Management Goals of Therapy:   [] Resolution in pain  [x] Decrease in pain level  [x] Improvement in ADL's  [x] Increase in activities with less pain  [] Decrease in medication      Controlled substance monitoring:    [x] Discussed medication side effects, risk of tolerance and/or dependence, and/or alternative treatment  [] Discussed the detrimental effects of long term narcotic use in younger patients especially women of childbearing years.   [x] No signs and symptoms of potential drug abuse or diversion were identified  [] Signs of potential drug abuse or diversion were identified   [] ORT Score   [] UDS non-compliant   [] See Note  [] Random urine drug screen sent today  [x] Random urine drug screen not completed today   [] Deferred New Patient  [x] Compliant 3/23/2020  [] Not Compliant see note  [] Medication agreement with provider signed today 6/22/2020  [x] Medication agreement with provider on file under media   [x] Medication regimen effective with c/o of side effects noted and continued   [] New patient continuing current medication while developing POC   [] On going assessment and evaluation of medication regimen  [] Medication regimen not effective see plan for changes  [x] Thomas Clifton reviewed & on file under media     CC:  CALI RAMIREZ APRN - CNP, 11/10/2020 at 3:39 PM    EMR dragon/transcription disclaimer: Much of this encounter note is electronic transcription/translation of spoken language to printed tach. Electronic translation of spoken language may be erroneous, or at times, nonsensical words or phrases may be inadvertently transcribed.  Although, I have reviewed the note for such errors, some may still exist.

## 2020-11-18 ENCOUNTER — HOSPITAL ENCOUNTER (OUTPATIENT)
Dept: PAIN MANAGEMENT | Age: 45
Discharge: HOME | End: 2020-11-18
Payer: MEDICARE

## 2020-11-18 VITALS
OXYGEN SATURATION: 100 % | SYSTOLIC BLOOD PRESSURE: 124 MMHG | HEART RATE: 85 BPM | DIASTOLIC BLOOD PRESSURE: 79 MMHG | RESPIRATION RATE: 18 BRPM

## 2020-11-18 PROCEDURE — 20553 NJX 1/MLT TRIGGER POINTS 3/>: CPT

## 2020-11-18 PROCEDURE — 2500000003 HC RX 250 WO HCPCS

## 2020-11-18 PROCEDURE — 20553 NJX 1/MLT TRIGGER POINTS 3/>: CPT | Performed by: NURSE PRACTITIONER

## 2020-11-18 RX ORDER — LIDOCAINE HYDROCHLORIDE 10 MG/ML
10 INJECTION, SOLUTION EPIDURAL; INFILTRATION; INTRACAUDAL; PERINEURAL ONCE
Status: DISCONTINUED | OUTPATIENT
Start: 2020-11-18 | End: 2020-11-20 | Stop reason: HOSPADM

## 2020-11-18 RX ORDER — FLUOXETINE HYDROCHLORIDE 20 MG/1
40 CAPSULE ORAL DAILY
COMMUNITY
Start: 2020-11-13 | End: 2022-10-27

## 2020-11-18 RX ORDER — BUPIVACAINE HYDROCHLORIDE 5 MG/ML
10 INJECTION, SOLUTION EPIDURAL; INTRACAUDAL ONCE
Status: DISCONTINUED | OUTPATIENT
Start: 2020-11-18 | End: 2020-11-20 | Stop reason: HOSPADM

## 2020-11-18 NOTE — PROCEDURES
[facility] Physical [facility] Pain [facility]    Patient Name: Zoya Thomas    : 1975                    Age: 54 y.o. Sex: female    Date: 2020    Pre-op Diagnosis: Myofascial Pain/ Muscle Spasms    Post-op Diagnosis: Myofascial Pain/ Muscle Spasms    Procedure: Lumbar Trigger Point Injections    Performing Procedure: NEDA ManningP - BC; VA-BC    Patient Vitals for the past 24 hrs:   BP Pulse Resp SpO2   20 1451 124/79 85 18 100 %       Description of Procedure:    After a brief physical assessment and failure to improve with conservative measures the patient presented for Lumbar Trigger Point Injections The indications, limitations and possible complications were discussed with the patient and the patient elected to proceed with the procedure. After voluntary, informed and signed consent obtained the patient was placed in a seated position. Appropriate time out was obtained per policy. The area of maximal tenderness was palpated over the bilaterally Lumbar Muscles - Lower Latissimus,  Erector Spinae, Lumbar Paraspinous. The skin overlying these areas was marked with a skin marker. The areas were prepped using aseptic technique with CHG prep. The skin overlying the proposed injection sites were then sprayed with Gebauer's Solution while protecting patient eyes. Each trigger point of the Lumbar Muscles - Lower Latissimus,  Erector Spinae, Lumbar Paraspinous was injected after negative aspiration was injected with approximately 1-2 ml of a solution of 5 ml of 1% Lidocaine Plain and 5 ml of 0.5% Marcaine Plain after negative aspiration    Discharge: The patient tolerated the procedure well. There were no complications during the procedure and the patient was discharged home with discharge instructions.     The patient has been instructed to contact the office should there be any complications or questions to arise between today and their next appointment. Plan:     Will return to the office in 6 weeks for follow up    AMNA Rosales CNP, 11/18/2020 at 4:03 PM

## 2020-11-24 ENCOUNTER — TELEPHONE (OUTPATIENT)
Dept: PAIN MANAGEMENT | Age: 45
End: 2020-11-24

## 2021-01-06 ENCOUNTER — HOSPITAL ENCOUNTER (OUTPATIENT)
Dept: PAIN MANAGEMENT | Age: 46
Discharge: HOME | End: 2021-01-06
Payer: MEDICARE

## 2021-01-06 VITALS
OXYGEN SATURATION: 100 % | TEMPERATURE: 98.4 F | DIASTOLIC BLOOD PRESSURE: 74 MMHG | RESPIRATION RATE: 18 BRPM | SYSTOLIC BLOOD PRESSURE: 121 MMHG | HEART RATE: 85 BPM

## 2021-01-06 PROCEDURE — 20553 NJX 1/MLT TRIGGER POINTS 3/>: CPT | Performed by: NURSE PRACTITIONER

## 2021-01-06 PROCEDURE — 2500000003 HC RX 250 WO HCPCS

## 2021-01-06 PROCEDURE — 20553 NJX 1/MLT TRIGGER POINTS 3/>: CPT

## 2021-01-06 RX ORDER — BUPIVACAINE HYDROCHLORIDE 5 MG/ML
15 INJECTION, SOLUTION EPIDURAL; INTRACAUDAL ONCE
Status: DISCONTINUED | OUTPATIENT
Start: 2021-01-06 | End: 2021-01-08 | Stop reason: HOSPADM

## 2021-01-06 RX ORDER — LIDOCAINE HYDROCHLORIDE 10 MG/ML
15 INJECTION, SOLUTION EPIDURAL; INFILTRATION; INTRACAUDAL; PERINEURAL ONCE
Status: DISCONTINUED | OUTPATIENT
Start: 2021-01-06 | End: 2021-01-08 | Stop reason: HOSPADM

## 2021-01-06 NOTE — PROCEDURES
%       Description of Procedure:    After a brief physical assessment and failure to improve with conservative measures the patient presented for Thoracic Trigger Point Injections The indications, limitations and possible complications were discussed with the patient and the patient elected to proceed with the procedure. After voluntary, informed and signed consent obtained the patient was placed in a seated position. Appropriate time out was obtained per policy. The areas were then prepped in a sterile fashion with Chloro-Prep. The area of maximal tenderness was palpated over the bilaterally Thoracic Muscles - Erector Spinae, Upper/Mid Latissimus, Rhomboid Minor, Rhomboid Major. The skin overlying these areas was marked with a skin marker. The areas were prepped using aseptic technique with CHG prep. The skin overlying the proposed injection sites were then sprayed with Gebauer's Solution while protecting patient eyes. Each trigger point of the Thoracic Muscles - Erector Spinae, Upper/Mid Latissimus, Rhomboid Minor, Rhomboid Major was injected after negative aspiration was injected with approximately 1-2 ml of a solution of 5 ml of 1% Lidocaine Plain and 5 ml of 0.5% Marcaine Plain after negative aspiration    Discharge: The patient tolerated the procedure well. There were no complications during the procedure and the patient was discharged home with discharge instructions. The patient has been instructed to contact the office should there be any complications or questions to arise between today and their next appointment. Plan:  Patient to follow up as office appointment in 6 weeks.      AMNA Aponte - CNP, 1/6/2021 at 1:15 PM

## 2021-01-11 ENCOUNTER — TELEPHONE (OUTPATIENT)
Dept: PAIN MANAGEMENT | Age: 46
End: 2021-01-11

## 2021-01-13 ENCOUNTER — HOSPITAL ENCOUNTER (OUTPATIENT)
Dept: PAIN MANAGEMENT | Age: 46
Discharge: HOME | End: 2021-01-13
Payer: MEDICARE

## 2021-01-13 PROCEDURE — 64615 CHEMODENERV MUSC MIGRAINE: CPT

## 2021-01-13 PROCEDURE — 64615 CHEMODENERV MUSC MIGRAINE: CPT | Performed by: NURSE PRACTITIONER

## 2021-01-13 PROCEDURE — 2580000003 HC RX 258

## 2021-01-13 PROCEDURE — 6360000002 HC RX W HCPCS

## 2021-01-13 RX ORDER — SODIUM CHLORIDE 9 MG/ML
4.5 INJECTION INTRAVENOUS ONCE
Status: DISCONTINUED | OUTPATIENT
Start: 2021-01-13 | End: 2021-01-15 | Stop reason: HOSPADM

## 2021-01-13 NOTE — PROCEDURES
[address]    Patient Name: Areli Delong    : 1975                    Age: 54 y.o. Sex: female    Date: 2021    Pre-op Diagnosis: Chronic Migraines    Post-op Diagnosis: Chronic Migraines    Procedure: Botox injections x 155 units (45 units wasted)    Performing Procedure: AMNA Hawkins    No data found. Previous Injections:  Patient had 3 headaches/migraines over the past month. Indications:    Areli Delong has been treated for problems with chronic migraine headaches. The patient was taken through a conservative course of treatment without complete resolution of symptoms. Botox injection therapy was offered and after the risks and benefits of the procedure were explained to the patient, we had agreed to proceed. The patient was taken to the procedure room and placed in the seated position. The following muscles were identified using palpation and standard landmarks. These muscles were marked and prepped with alcohol. A total of 155 units were injected after careful aspiration and the following distribution bilaterally:  10 units in 2 sites, procerus 5 units in one site, frontalis 20 units in 4 sites, temporalis 40 unit in 8 sites, occipitals 30 units in 6 sites, cervical paraspinals 20 units in 4 sites, and trapezius 30 units in 6 sites. Discharge: The patient tolerated the procedure well. There were no complications during the procedure and the patient was discharged home with discharge instructions. The patient has been instructed to contact the office should there be any complications or questions to arise between today and their next appointment. Plan:    Patient to follow up in the office in about 6 weeks.      AMNA Leary - CNP, 2021 at 1:56 PM

## 2021-02-09 DIAGNOSIS — G89.29 CHRONIC BILATERAL LOW BACK PAIN WITHOUT SCIATICA: Chronic | ICD-10-CM

## 2021-02-09 DIAGNOSIS — M54.50 CHRONIC BILATERAL LOW BACK PAIN WITHOUT SCIATICA: Chronic | ICD-10-CM

## 2021-02-09 RX ORDER — HYDROCODONE BITARTRATE AND ACETAMINOPHEN 5; 325 MG/1; MG/1
1 TABLET ORAL EVERY 8 HOURS PRN
Qty: 60 TABLET | Refills: 0 | Status: SHIPPED | OUTPATIENT
Start: 2021-02-09 | End: 2021-03-01 | Stop reason: SDUPTHER

## 2021-03-01 ENCOUNTER — HOSPITAL ENCOUNTER (OUTPATIENT)
Dept: PAIN MANAGEMENT | Age: 46
Discharge: HOME | End: 2021-03-01
Payer: MEDICARE

## 2021-03-01 VITALS
DIASTOLIC BLOOD PRESSURE: 72 MMHG | TEMPERATURE: 99.7 F | BODY MASS INDEX: 22.32 KG/M2 | HEART RATE: 94 BPM | OXYGEN SATURATION: 98 % | HEIGHT: 63 IN | SYSTOLIC BLOOD PRESSURE: 138 MMHG | WEIGHT: 126 LBS | RESPIRATION RATE: 16 BRPM

## 2021-03-01 DIAGNOSIS — M54.50 CHRONIC BILATERAL LOW BACK PAIN WITHOUT SCIATICA: Chronic | ICD-10-CM

## 2021-03-01 DIAGNOSIS — G89.29 CHRONIC BILATERAL LOW BACK PAIN WITHOUT SCIATICA: Chronic | ICD-10-CM

## 2021-03-01 DIAGNOSIS — G43.019 INTRACTABLE MIGRAINE WITHOUT AURA AND WITHOUT STATUS MIGRAINOSUS: Primary | Chronic | ICD-10-CM

## 2021-03-01 DIAGNOSIS — M79.18 MYOFASCIAL PAIN: ICD-10-CM

## 2021-03-01 PROCEDURE — 99213 OFFICE O/P EST LOW 20 MIN: CPT

## 2021-03-01 PROCEDURE — 99213 OFFICE O/P EST LOW 20 MIN: CPT | Performed by: NURSE PRACTITIONER

## 2021-03-01 RX ORDER — CYCLOBENZAPRINE HCL 5 MG
5 TABLET ORAL 3 TIMES DAILY PRN
Qty: 90 TABLET | Refills: 2 | Status: SHIPPED | OUTPATIENT
Start: 2021-03-01 | End: 2021-05-27 | Stop reason: ALTCHOICE

## 2021-03-01 RX ORDER — BUTALBITAL, ACETAMINOPHEN AND CAFFEINE 50; 325; 40 MG/1; MG/1; MG/1
1 TABLET ORAL DAILY PRN
Qty: 30 TABLET | Refills: 2 | Status: SHIPPED | OUTPATIENT
Start: 2021-03-01 | End: 2021-11-02 | Stop reason: SDUPTHER

## 2021-03-01 RX ORDER — CLONAZEPAM 0.5 MG/1
0.5 TABLET ORAL 2 TIMES DAILY PRN
COMMUNITY
End: 2021-07-14

## 2021-03-01 RX ORDER — SUMATRIPTAN 50 MG/1
50 TABLET, FILM COATED ORAL
Qty: 9 TABLET | Refills: 3 | Status: SHIPPED | OUTPATIENT
Start: 2021-03-01 | End: 2021-09-30 | Stop reason: SDUPTHER

## 2021-03-01 RX ORDER — HYDROCODONE BITARTRATE AND ACETAMINOPHEN 5; 325 MG/1; MG/1
1 TABLET ORAL EVERY 8 HOURS PRN
Qty: 60 TABLET | Refills: 0 | Status: SHIPPED | OUTPATIENT
Start: 2021-03-11 | End: 2021-05-04 | Stop reason: SDUPTHER

## 2021-03-01 ASSESSMENT — PAIN SCALES - GENERAL: PAINLEVEL_OUTOF10: 4

## 2021-03-01 ASSESSMENT — ENCOUNTER SYMPTOMS
CONSTIPATION: 0
BACK PAIN: 1

## 2021-03-01 NOTE — PROGRESS NOTES
[facility]    Office Visit    Patient Name: Raman Soares    MR #: [id number]    Account [de-identified]    : 1975    Age: 51 y.o. Sex: female    Date: 3/1/2020    PCP: CALI Dockery    Chief Complaint:   Chief Complaint   Patient presents with    Headache    Neck Pain    Back Pain       History of Present Illness: The patient is a 51 y.o. female who presents for follow up of procedure. Patient had Lumbar trigger point injections on 2020, Cervical and thoracic trigger point injections on 2021, and Botox for Migraines on 2021. Patient had at least 85% relief of pain from procedure(s) for at least 6 weeks and was able to increase activity after procedure. Patient received enough pain relief from injections that the patient would like to repeat the injection(s). Patient states that she does not have to use abortive migraine medications until the last two to four weeks prior to Botox injections and sometimes the two weeks after receiving injections. Patient will take Fioricet first especially is she is not at home then she will take Imitrex. The Imitrex causes her to be very drowsy. Patient is behind on lumbar injections due to providers scheduled and her low back is bothering her a little more right now. Back Pain  This is a chronic problem. The current episode started more than 1 year ago. The problem occurs intermittently. The problem has been waxing and waning since onset. The pain is present in the lumbar spine and sacro-iliac. The quality of the pain is described as cramping and aching. The symptoms are aggravated by bending, sitting and standing. Associated symptoms include headaches. Headache   This is a chronic problem. The current episode started more than 1 year ago. The problem occurs constantly. The problem has been waxing and waning. The pain is located in the bilateral, frontal and occipital region.  Associated symptoms include back pain, blurred vision, nausea, neck pain, phonophobia, photophobia, scalp tenderness and a visual change. The symptoms are aggravated by emotional stress, activity, bright light and noise. Neck Pain   Associated symptoms include headaches, photophobia and a visual change. Screening Tools:    PEG Score: 5.7     Last PEG Score: 3.6     Annual ORT Score: 4     Annual PHQ Score: 5     Current Pain Assessment  Pain Assessment  Pain Assessment: 0-10  Pain Level: 4  Pain Type: Chronic pain  Pain Location: Back, Head, Neck    Past Visit HPI:  11/10/2020  presents for procedure follow-up. Patient had bilateral SI joint injections with lumbar trigger points on 7/1/2020, patient had cervical and thoracic trigger points on 7/15/2020, and patient had Botox injections on 9/2/2020. Patient had at least 85% relief of pain from procedure(s) for at least 6 weeks and was able to increase activity after procedure. Patient received enough pain relief from injections that the patient would like to repeat the injection(s).    6/22/2020  presents to the office for annual exam with primary complaints of chronic headaches and back pain. Patient was referred to this office last April 2018 with a past history of chiari malformation. Patient had a posterior fossa decompression and her pain had pretty much resolved for approximately 3 years. However during the year prior to coming back to this office patient started developing pain again. She started having chronic headaches, along with myofascial pain in the shoulders and thoracic spine. Through the years patient has been on numerous medications to treat headaches including the following but not limited to Topamax, amitriptyline, propranolol, Imitrex, gabapentin, and Lyrica, Fioricet does help with the intense headaches but he does not make headaches tolerable. She does follow with neurology and she has been started on Aimovig which helps.  Patient had been getting bilateral occipital nerve blocks along with trigger point injections in her cervical, thoracic, and lumbar spine. Patient was changed to botox injections in October 2019. Patient has had excellent results from botox injections. Patient did have a flare up of low back pain. She has had SI injections which have made low back pain improve. Past Medical History  Past Medical History:   Diagnosis Date    ADHD (attention deficit hyperactivity disorder)     Anemia     Anxiety     Cervicalgia     Chiari malformation type I ([facility] [address]     s/p decompression 2014    Chronic back pain     Depression     Headache(784.0)     Hiatal hernia     Hyperlipidemia     Indigestion     Obstructive sleep apnea     Mild with an AHI:  7.5 and  RDI:  14.7       Medications  Current Outpatient Medications   Medication Sig Dispense Refill    clonazePAM (KLONOPIN) 0.5 MG tablet Take 0.5 mg by mouth 2 times daily as needed.  HYDROcodone-acetaminophen (NORCO) 5-325 MG per tablet Take 1 tablet by mouth every 8 hours as needed for Pain for up to 30 days. 60 tablet 0    FLUoxetine (PROZAC) 20 MG capsule Take 40 mg by mouth daily       ibuprofen (ADVIL;MOTRIN) 800 MG tablet Take 1 tablet by mouth 3 times daily as needed for Pain 90 tablet 2    Ubrogepant (UBRELVY) 100 MG TABS Take 100 mg by mouth daily as needed (migraines) 10 tablet 2    butalbital-acetaminophen-caffeine (FIORICET, ESGIC) -40 MG per tablet Take 1 tablet by mouth 2 times daily as needed for Headaches or Migraine 30 tablet 2    VYVANSE 30 MG capsule Take 1 capsule by mouth daily.  lidocaine (LIDODERM) 5 % Place 3 patches onto the skin daily 12 hours on, 12 hours off. 30 patch 2    zolpidem (AMBIEN) 10 MG tablet Take 1 tablet by mouth nightly. .  2    buPROPion (WELLBUTRIN XL) 300 MG extended release tablet Take 1 tablet by mouth daily  2    lamoTRIgine (LAMICTAL) 100 MG tablet Take 2.5 tablets by mouth nightly  2     No current facility-administered medications for this encounter. Allergies  Ultram [tramadol]    Current Medications  Current Outpatient Medications   Medication Sig Dispense Refill    clonazePAM (KLONOPIN) 0.5 MG tablet Take 0.5 mg by mouth 2 times daily as needed.  HYDROcodone-acetaminophen (NORCO) 5-325 MG per tablet Take 1 tablet by mouth every 8 hours as needed for Pain for up to 30 days. 60 tablet 0    FLUoxetine (PROZAC) 20 MG capsule Take 40 mg by mouth daily       ibuprofen (ADVIL;MOTRIN) 800 MG tablet Take 1 tablet by mouth 3 times daily as needed for Pain 90 tablet 2    Ubrogepant (UBRELVY) 100 MG TABS Take 100 mg by mouth daily as needed (migraines) 10 tablet 2    butalbital-acetaminophen-caffeine (FIORICET, ESGIC) -40 MG per tablet Take 1 tablet by mouth 2 times daily as needed for Headaches or Migraine 30 tablet 2    VYVANSE 30 MG capsule Take 1 capsule by mouth daily.  lidocaine (LIDODERM) 5 % Place 3 patches onto the skin daily 12 hours on, 12 hours off. 30 patch 2    zolpidem (AMBIEN) 10 MG tablet Take 1 tablet by mouth nightly. .  2    buPROPion (WELLBUTRIN XL) 300 MG extended release tablet Take 1 tablet by mouth daily  2    lamoTRIgine (LAMICTAL) 100 MG tablet Take 2.5 tablets by mouth nightly  2     No current facility-administered medications for this encounter.         Social History    Social History     Socioeconomic History    Marital status:      Spouse name: None    Number of children: None    Years of education: None    Highest education level: None   Occupational History    None   Social Needs    Financial resource strain: None    Food insecurity     Worry: None     Inability: None    Transportation needs     Medical: None     Non-medical: None   Tobacco Use    Smoking status: Never Smoker    Smokeless tobacco: Never Used   Substance and Sexual Activity    Alcohol use: No    Drug use: No    Sexual activity: Yes     Partners: Male   Lifestyle    Physical activity     Days per week: None     Minutes per session: None    Stress: None   Relationships    Social connections     Talks on phone: None     Gets together: None     Attends Orthodoxy service: None     Active member of club or organization: None     Attends meetings of clubs or organizations: None     Relationship status: None    Intimate partner violence     Fear of current or ex partner: None     Emotionally abused: None     Physically abused: None     Forced sexual activity: None   Other Topics Concern    None   Social History Narrative    None         Family History  family history includes Cancer in her maternal aunt and another family member; High Blood Pressure in her father and sister. Review of Systems:  Review of Systems   Constitutional: Positive for activity change. Eyes: Positive for blurred vision, photophobia and visual disturbance. Gastrointestinal: Positive for nausea. Negative for constipation. Musculoskeletal: Positive for arthralgias, back pain, myalgias, neck pain and neck stiffness. Neurological: Positive for headaches. Psychiatric/Behavioral: Positive for sleep disturbance. Negative for agitation, self-injury and suicidal ideas. The patient is not nervous/anxious. 14 point ROS negative besides that noted in HPI    Physical exam:     Patient Vitals for the past 24 hrs:   BP Temp Temp src Pulse Resp SpO2 Height Weight   03/01/21 1400 138/72 99.7 °F (37.6 °C) Temporal 94 16 98 % 5' 3\" (1.6 m) 126 lb (57.2 kg)       Body mass index is 22.32 kg/m². Physical Exam  Vitals signs and nursing note reviewed. Constitutional:       General: She is not in acute distress. Appearance: She is well-developed. HENT:      Head: Normocephalic. Right Ear: External ear normal.      Left Ear: External ear normal.      Nose: Nose normal.   Eyes:      Conjunctiva/sclera: Conjunctivae normal.      Pupils: Pupils are equal, round, and reactive to light.    Neck:      Musculoskeletal: Normal range of motion and neck supple. Cardiovascular:      Rate and Rhythm: Normal rate and regular rhythm. Pulmonary:      Effort: Pulmonary effort is normal.      Breath sounds: Normal breath sounds. Abdominal:      General: Bowel sounds are normal.      Palpations: Abdomen is soft. Musculoskeletal:      Cervical back: She exhibits tenderness, pain and spasm. Thoracic back: She exhibits tenderness, pain and spasm. Lumbar back: She exhibits tenderness, pain and spasm. Comments: Noted to have tender palpable knots noted in muscles of the cervical, thoracic and lumbar spine (trigger points identified)    Positive Bilateral  SI tenderness noted. Positive distraction, positive thigh thrust, positive Hector's Bilaterally   Skin:     General: Skin is warm and dry. Neurological:      Mental Status: She is alert and oriented to person, place, and time. Cranial Nerves: No cranial nerve deficit. Sensory: No sensory deficit. Deep Tendon Reflexes: Reflexes are normal and symmetric. Psychiatric:         Behavior: Behavior normal.         Thought Content:  Thought content normal.         Judgment: Judgment normal.         LABS:     Lab Results   Component Value Date     08/27/2011    K 4.0 08/27/2011     08/27/2011    CO2 32 08/27/2011    BUN 10 08/27/2011    CREATININE 0.8 08/27/2011    GLUCOSE 94 08/27/2011    CALCIUM 9.3 08/27/2011        Lab Results   Component Value Date    WBC 6.87 08/27/2011    HGB 12.6 08/27/2011    HCT 38.0 08/27/2011    MCV 91.1 08/27/2011     08/27/2011       Assessment:                                                                                                                                        Active Problems:    Restless leg syndrome    Chiari malformation type I (HCC)    Intractable migraine without aura and without status migrainosus    Myofascial pain    Bilateral occipital neuralgia    SI (sacroiliac) joint dysfunction    Chronic bilateral low back pain without sciatica    Pain management contract agreement  Resolved Problems:    * No resolved hospital problems. *      PLAN:  Continue medication as previously prescribed except Robaxin. Stop and change to flexeril due to ineffectiveness. Myofascial pain  - cyclobenzaprine (FLEXERIL) 5 MG tablet; Take 1 tablet by mouth 3 times daily as needed for Muscle spasms  Dispense: 90 tablet; Refill: 2    Intractable migraine without aura and without status migrainosus  - SUMAtriptan (IMITREX) 50 MG tablet; Take 1 tablet by mouth once as needed for Migraine  Dispense: 9 tablet; Refill: 3  - butalbital-acetaminophen-caffeine (FIORICET, ESGIC) -40 MG per tablet; Take 1 tablet by mouth daily as needed for Headaches (may repeat in 4 hours x 1 no more than 2 per day)  Dispense: 30 tablet; Refill: 2    Chronic bilateral low back pain without sciatica  - HYDROcodone-acetaminophen (NORCO) 5-325 MG per tablet; Take 1 tablet by mouth every 8 hours as needed for Pain for up to 30 days. Take lowest dose possible to manage pain  Dispense: 60 tablet; Refill: 0    Myofascial pain  - methocarbamol (ROBAXIN-750) 750 MG tablet; Take 1 tablet by mouth 3 times daily  Dispense: 90 tablet; Refill: 2      Continue Botox for Migraines as patient has had a 50% reduction in migraines and migraine symptoms since starting Botox. Patient to continue with Neurology. Continue Motrin no refill needed patient takes sparingly no refill needed. Repeat Bilateral cervical and thoracic trigger point injections as patient has had good response in the past.    Repeat bilateral lumbar trigger point injections as patient has had good response with those in the past.    Patient to call when she feels she needs to repeat bilateral SI injections in the future as patient does not feel they need to be repeated at this time as they are still effective.     [x] Follow up    [] 4 weeks   [x] 6-8 weeks   [] 10-12 weeks   [] 3 months  [x] Post procedure to evaluate effectiveness of treatment  [] To evaluate medications changes made at office visit. [] To review diagnostics ordered at last visit. [] To evaluate response to therapy    [x] For management of controlled substance  [x] Random UDS as indicated by ORT score or if indicated by abberent behaviors    Discussion: Discussed exam findings and plan of care with patient. Patient agreed with POC and questions were asked and answered. Activity: discussed exercise as beneficial to pain reduction, encouraged stretching exercise with a focus on torso strengthening. Goals:  Pain Management Goals of Therapy:   [] Resolution in pain  [x] Decrease in pain level  [x] Improvement in ADL's  [x] Increase in activities with less pain  [] Decrease in medication      Controlled substance monitoring:    [x] Discussed medication side effects, risk of tolerance and/or dependence, and/or alternative treatment  [] Discussed the detrimental effects of long term narcotic use in younger patients especially women of childbearing years.   [x] No signs and symptoms of potential drug abuse or diversion were identified  [] Signs of potential drug abuse or diversion were identified   [] ORT Score   [] UDS non-compliant   [] See Note  [] Random urine drug screen sent today  [x] Random urine drug screen not completed today   [] Deferred New Patient  [x] Compliant 11/10/2020  [] Not Compliant see note  [] Medication agreement with provider signed today 6/22/2020  [x] Medication agreement with provider on file under media   [] Medication regimen effective with c/o of side effects noted and continued   [] New patient continuing current medication while developing POC   [x] On going assessment and evaluation of medication regimen  [] Medication regimen not effective see plan for changes  [x] Nolberto Stephens reviewed & on file under media     CC:  MAGGIE SANTOS, CALI Will, APRN - CNP, 3/1/2021 at 2:34 PM    EMR dragon/transcription disclaimer: Much of this encounter note is electronic transcription/translation of spoken language to printed tach. Electronic translation of spoken language may be erroneous, or at times, nonsensical words or phrases may be inadvertently transcribed.  Although, I have reviewed the note for such errors, some may still exist.

## 2021-03-01 NOTE — PROGRESS NOTES
Clinic Documentation      Education Provided:  [x] Review of Maria Elena Galan  [] Agreement Review  [x] PEG Score Calculated [] PHQ Score Calculated [] ORT Score Calculated    [] Compliance Issues Discussed [] Cognitive Behavior Needs [x] Exercise [] Review of Test [] Financial Issues  [x] Tobacco/Alcohol Use Reviewed [x] Teaching [] New Patient [] Picture Obtained    Physician Plan:  [] Outgoing Referral  [] Pharmacy Consult  [] Test Ordered [x] Prescription Ordered/Changed   [] Obtained Test Results / Consult Notes        Complete if patient is withholding blood thinner for procedure     Blood Thinner Patient is currently taking:      [] Plavix (Hold for 7 days)  [] Aspirin (Hold for 5 days)     [] Pletal (Hold for 2 days)  [] Pradaxa (Hold for 3 days)    [] Effient (Hold for 7 days)  [] Xarelto (Hold for 2 days)    [] Eliquis (Hold for 2 days)  [] Brilinta (Hold for 7 days)    [] Coumadin (Hold for 5 days) - (INR needs to be drawn the day prior to procedure- INR < 2.0)    [] Aggrenox (Hold for 7 days)        [] Patient will stop medication on their own.    [] Blood Thinner Form Faxed for approval to hold.    Provider form faxed to:     Assessment Completed by:  Electronically signed by Ramirez Fontaine RN on 3/1/2021 at 2:52 PM

## 2021-03-24 ENCOUNTER — HOSPITAL ENCOUNTER (OUTPATIENT)
Dept: PAIN MANAGEMENT | Age: 46
Discharge: HOME | End: 2021-03-24
Payer: MEDICARE

## 2021-03-24 VITALS
DIASTOLIC BLOOD PRESSURE: 65 MMHG | SYSTOLIC BLOOD PRESSURE: 123 MMHG | TEMPERATURE: 98.2 F | OXYGEN SATURATION: 96 % | HEART RATE: 91 BPM | RESPIRATION RATE: 18 BRPM

## 2021-03-24 PROCEDURE — 20553 NJX 1/MLT TRIGGER POINTS 3/>: CPT | Performed by: NURSE PRACTITIONER

## 2021-03-24 PROCEDURE — 2500000003 HC RX 250 WO HCPCS

## 2021-03-24 PROCEDURE — 20553 NJX 1/MLT TRIGGER POINTS 3/>: CPT

## 2021-03-24 RX ORDER — BUPIVACAINE HYDROCHLORIDE 5 MG/ML
15 INJECTION, SOLUTION EPIDURAL; INTRACAUDAL ONCE
Status: DISCONTINUED | OUTPATIENT
Start: 2021-03-24 | End: 2021-03-26 | Stop reason: HOSPADM

## 2021-03-24 RX ORDER — LIDOCAINE HYDROCHLORIDE 10 MG/ML
15 INJECTION, SOLUTION EPIDURAL; INFILTRATION; INTRACAUDAL; PERINEURAL ONCE
Status: DISCONTINUED | OUTPATIENT
Start: 2021-03-24 | End: 2021-03-26 | Stop reason: HOSPADM

## 2021-03-24 NOTE — PROCEDURES
[facility]    Patient Name: Luz Marina De Leon    : 1975                    Age: 51 y.o. Sex: female    Date: 2021    Pre-op Diagnosis: Myofascial Pain/ Muscle Spasms/ Cervicalgia    Post-op Diagnosis: Myofascial Pain/ Muscle Spasms/ Cervicalgia    Procedure: Cervical Trigger Point Injections    Performing Procedure: SANDRO Pérez, VA-BC    Patient Vitals for the past 24 hrs:   BP Temp Temp src Pulse Resp SpO2   21 1101 123/65 98.2 °F (36.8 °C) Temporal 91 18 96 %       Description of Procedure:    After a brief physical assessment and failure to improve with conservative measures the patient presented for Cervical Trigger Point Injections The indications, limitations and possible complications were discussed with the patient and the patient elected to proceed with the procedure. After voluntary, informed and signed consent obtained the patient was placed in a seated position. Appropriate time out was obtained per policy. The area of maximal tenderness was palpated over the  bilaterally Cervical muscles - Splenius  Trapezius  Rhomboid The skin overlying these areas was marked with a skin marker. The skin overlying the proposed injection sites were then sprayed with Gebauer's Solution while protecting patient eyes. The areas were prepped using aseptic technique with CHG prep.  Each trigger point of the Cervical muscles - Splenius  Trapezius  Rhomboid was injected with approximately 1- 2 ml of a solution of 5 ml of 1% Lidocaine Plain and 5 ml of 0.5% Marcaine Plain after negative aspiration    Pre-op Diagnosis: Myofascial Pain/ Muscle Spasms    Post-op Diagnosis: Myofascial Pain/ Muscle Spasms    Procedure: Thoracic Trigger Point Injections     Performing Procedure: SANDRO Pérez, [facility]    Patient Vitals for the past 24 hrs:   BP Temp Temp src Pulse Resp SpO2   21 1101 123/65 98.2 °F (36.8 °C) Temporal 91 18 96 %

## 2021-03-30 ENCOUNTER — TELEPHONE (OUTPATIENT)
Dept: PAIN MANAGEMENT | Age: 46
End: 2021-03-30

## 2021-03-30 DIAGNOSIS — M79.18 MYOFASCIAL PAIN: ICD-10-CM

## 2021-03-31 ENCOUNTER — HOSPITAL ENCOUNTER (OUTPATIENT)
Dept: PAIN MANAGEMENT | Age: 46
Discharge: HOME | End: 2021-03-31
Payer: MEDICARE

## 2021-03-31 VITALS
DIASTOLIC BLOOD PRESSURE: 56 MMHG | HEART RATE: 77 BPM | TEMPERATURE: 98 F | SYSTOLIC BLOOD PRESSURE: 108 MMHG | OXYGEN SATURATION: 100 % | RESPIRATION RATE: 18 BRPM

## 2021-03-31 PROCEDURE — 76942 ECHO GUIDE FOR BIOPSY: CPT | Performed by: NURSE PRACTITIONER

## 2021-03-31 PROCEDURE — 2500000003 HC RX 250 WO HCPCS

## 2021-03-31 PROCEDURE — 6360000002 HC RX W HCPCS

## 2021-03-31 PROCEDURE — 20611 DRAIN/INJ JOINT/BURSA W/US: CPT

## 2021-03-31 PROCEDURE — 20553 NJX 1/MLT TRIGGER POINTS 3/>: CPT

## 2021-03-31 PROCEDURE — 20553 NJX 1/MLT TRIGGER POINTS 3/>: CPT | Performed by: NURSE PRACTITIONER

## 2021-03-31 RX ORDER — TRIAMCINOLONE ACETONIDE 40 MG/ML
80 INJECTION, SUSPENSION INTRA-ARTICULAR; INTRAMUSCULAR ONCE
Status: DISCONTINUED | OUTPATIENT
Start: 2021-03-31 | End: 2021-04-02 | Stop reason: HOSPADM

## 2021-03-31 RX ORDER — LIDOCAINE HYDROCHLORIDE 10 MG/ML
10 INJECTION, SOLUTION EPIDURAL; INFILTRATION; INTRACAUDAL; PERINEURAL ONCE
Status: DISCONTINUED | OUTPATIENT
Start: 2021-03-31 | End: 2021-04-02 | Stop reason: HOSPADM

## 2021-03-31 RX ORDER — BUPIVACAINE HYDROCHLORIDE 5 MG/ML
4 INJECTION, SOLUTION EPIDURAL; INTRACAUDAL ONCE
Status: DISCONTINUED | OUTPATIENT
Start: 2021-03-31 | End: 2021-04-02 | Stop reason: HOSPADM

## 2021-03-31 RX ORDER — BUPIVACAINE HYDROCHLORIDE 5 MG/ML
10 INJECTION, SOLUTION EPIDURAL; INTRACAUDAL ONCE
Status: DISCONTINUED | OUTPATIENT
Start: 2021-03-31 | End: 2021-04-02 | Stop reason: HOSPADM

## 2021-03-31 RX ORDER — LIDOCAINE HYDROCHLORIDE 10 MG/ML
4 INJECTION, SOLUTION EPIDURAL; INFILTRATION; INTRACAUDAL; PERINEURAL ONCE
Status: DISCONTINUED | OUTPATIENT
Start: 2021-03-31 | End: 2021-04-02 | Stop reason: HOSPADM

## 2021-03-31 ASSESSMENT — ENCOUNTER SYMPTOMS
SCALP TENDERNESS: 1
VISUAL CHANGE: 1
NAUSEA: 1
PHOTOPHOBIA: 1
BLURRED VISION: 1

## 2021-03-31 NOTE — PROCEDURES
[facility]    Patient Name: Natalee Burgos    : 1975    Age: 51 y.o. Sex: female    Date: 2021    Pre-op Diagnosis: bilateral  Sacroiliac Joint(s) Dysfunction/ Sacroiliitis    Post-op Diagnosis: bilateral  Sacroiliac Joint(s) Dysfunction/ Sacroliliits    Procedure: Ultrasound Guided Injection of  bilateral Sacroiliac Joint(s)     Performing Procedure:  Chito Hendrickson, ACAGNP - BC, [facility]-BC    Patient Vitals for the past 24 hrs:   BP Temp Temp src Pulse Resp SpO2   21 1004 (!) 108/56 98 °F (36.7 °C) Temporal 77 18 100 %       bilateral  Sacroiliac Joint(s)     Description of Procedure:    After voluntary, informed and signed consent obtained the patient was placed in a prone position. Appropriate time out was obtained per policy. The Sacroiliac Joint(s) was palpated for area of maximal tenderness. The area was prepped in an aseptic fashion with CHG swab. The ultrasound transducer was used to confirm the appropriate location. The skin was sprayed with Gebauer's Solution. Under aseptic technique and direct ultrasound visualization a 22 gauge 3 inch spinal needle was introduced into the Sacroiliac Joint(s). After a negative aspiration, a solution of 2 ml of 0.5% Marcaine Plain and 2 ml of 1% Lidocaine Plain and 1 ml of Kenalog (40 mg/ml) was injected into the Sacroiliac Joint(s). The needle was withdrawn and a sterile dressing applied. If this was a bilateral procedure, the same steps were followed on the opposite side. Discharge: The patient tolerated the procedure well. There were no complications during the procedure and the patient was discharged home with discharge instructions. The patient has been instructed to contact the office should there be any complications or questions to arise between today and their next appointment.     Pre-op Diagnosis: Myofascial Pain/ Muscle Spasms    Post-op Diagnosis: Myofascial Pain/ Muscle Spasms    Procedure: Lumbar Trigger Point Injections    Performing Procedure: SANDRO Silverman - BC; [facility]    Patient Vitals for the past 24 hrs:   BP Temp Temp src Pulse Resp SpO2   03/31/21 1004 (!) 108/56 98 °F (36.7 °C) Temporal 77 18 100 %       Description of Procedure:    After a brief physical assessment and failure to improve with conservative measures the patient presented for Lumbar Trigger Point Injections The indications, limitations and possible complications were discussed with the patient and the patient elected to proceed with the procedure. After voluntary, informed and signed consent obtained the patient was placed in a seated position. Appropriate time out was obtained per policy. The area of maximal tenderness was palpated over the bilaterally Lumbar Muscles - Lower Latissimus,  Erector Spinae, Lumbar Paraspinous. The skin overlying these areas was marked with a skin marker. The areas were prepped using aseptic technique with CHG prep. The skin overlying the proposed injection sites were then sprayed with Gebauer's Solution while protecting patient eyes. Each trigger point of the Lumbar Muscles - Lower Latissimus,  Erector Spinae, Lumbar Paraspinous was injected after negative aspiration was injected with approximately 1-2 ml of a solution of 5 ml of 1% Lidocaine Plain and 5 ml of 0.5% Marcaine Plain after negative aspiration    Discharge: The patient tolerated the procedure well. There were no complications during the procedure and the patient was discharged home with discharge instructions. The patient has been instructed to contact the office should there be any complications or questions to arise between today and their next appointment. Plan:     Will return to the office in 6 weeks for follow up    AMNA Conrad CNP, 3/31/2021 at 3:16 PM

## 2021-04-07 ENCOUNTER — TELEPHONE (OUTPATIENT)
Dept: PAIN MANAGEMENT | Age: 46
End: 2021-04-07

## 2021-05-04 DIAGNOSIS — M54.50 CHRONIC BILATERAL LOW BACK PAIN WITHOUT SCIATICA: Chronic | ICD-10-CM

## 2021-05-04 DIAGNOSIS — G89.29 CHRONIC BILATERAL LOW BACK PAIN WITHOUT SCIATICA: Chronic | ICD-10-CM

## 2021-05-09 RX ORDER — HYDROCODONE BITARTRATE AND ACETAMINOPHEN 5; 325 MG/1; MG/1
1 TABLET ORAL EVERY 8 HOURS PRN
Qty: 60 TABLET | Refills: 0 | Status: SHIPPED | OUTPATIENT
Start: 2021-05-09 | End: 2021-06-21 | Stop reason: SDUPTHER

## 2021-05-27 ENCOUNTER — HOSPITAL ENCOUNTER (OUTPATIENT)
Dept: PAIN MANAGEMENT | Age: 46
Discharge: HOME | End: 2021-05-27
Payer: MEDICARE

## 2021-05-27 VITALS
OXYGEN SATURATION: 100 % | BODY MASS INDEX: 23.39 KG/M2 | TEMPERATURE: 97 F | SYSTOLIC BLOOD PRESSURE: 110 MMHG | HEIGHT: 63 IN | DIASTOLIC BLOOD PRESSURE: 70 MMHG | WEIGHT: 132 LBS

## 2021-05-27 DIAGNOSIS — M79.18 MYOFASCIAL PAIN: ICD-10-CM

## 2021-05-27 PROCEDURE — 99213 OFFICE O/P EST LOW 20 MIN: CPT

## 2021-05-27 PROCEDURE — 99213 OFFICE O/P EST LOW 20 MIN: CPT | Performed by: NURSE PRACTITIONER

## 2021-05-27 RX ORDER — BACLOFEN 10 MG/1
TABLET ORAL
Qty: 60 TABLET | Refills: 2 | Status: SHIPPED | OUTPATIENT
Start: 2021-05-27 | End: 2021-09-09

## 2021-05-27 ASSESSMENT — ENCOUNTER SYMPTOMS
CONSTIPATION: 0
BLURRED VISION: 1
SCALP TENDERNESS: 1
NAUSEA: 1
BACK PAIN: 1
PHOTOPHOBIA: 1
VISUAL CHANGE: 1

## 2021-05-27 ASSESSMENT — PAIN DESCRIPTION - PAIN TYPE: TYPE: CHRONIC PAIN

## 2021-05-27 ASSESSMENT — PAIN DESCRIPTION - LOCATION: LOCATION: BACK

## 2021-05-27 NOTE — PROGRESS NOTES
Clinic Documentation      Education Provided:  [x] Review of [facility]  [] Agreement Review  [x] PEG Score Calculated [] PHQ Score Calculated [] ORT Score Calculated    [] Compliance Issues Discussed [] Cognitive Behavior Needs [x] Exercise [] Review of Test [] Financial Issues  [x] Tobacco/Alcohol Use Reviewed [x] Teaching [] New Patient [] Picture Obtained    Physician Plan:  [] Outgoing Referral  [] Pharmacy Consult  [] Test Ordered [x] Prescription Ordered/Changed   [] Obtained Test Results / Consult Notes        Complete if patient is withholding blood thinner for procedure     Blood Thinner Patient is currently taking:      [] Plavix (Hold for 7 days)  [] Aspirin (Hold for 5 days)     [] Pletal (Hold for 2 days)  [] Pradaxa (Hold for 3 days)    [] Effient (Hold for 7 days)  [] Xarelto (Hold for 2 days)    [] Eliquis (Hold for 2 days)  [] Brilinta (Hold for 7 days)    [] Coumadin (Hold for 5 days) - (INR needs to be drawn the day prior to procedure- INR < 2.0)    [] Aggrenox (Hold for 7 days)        [] Patient will stop medication on their own.    [] Blood Thinner Form Faxed for approval to hold.    Provider form faxed to:   Assessment Completed by:  Electronically signed by Savannah Guardado on 5/27/2021 at 1:27 PM

## 2021-05-27 NOTE — PROGRESS NOTES
symptoms include headaches, photophobia and a visual change. Back Pain  This is a chronic problem. The current episode started more than 1 year ago. The problem occurs intermittently. The problem has been waxing and waning since onset. The pain is present in the lumbar spine and sacro-iliac. The quality of the pain is described as cramping and aching. The symptoms are aggravated by bending, sitting and standing. Associated symptoms include headaches. Screening Tools:    PEG Score: 4     Last PEG Score: 5.7     Annual ORT Score: 4     Annual PHQ Score: 5     Current Pain Assessment  Pain Assessment  Pain Level: 3  Patient's Stated Pain Goal: 2  Pain Type: Chronic pain  Pain Location: Back    Past Visit HPI:  3/1/2021  presents for follow up of procedure. Patient had Lumbar trigger point injections on 11/18/2020, Cervical and thoracic trigger point injections on 1/6/2021, and Botox for Migraines on 1/13/2021. Patient had at least 85% relief of pain from procedure(s) for at least 6 weeks and was able to increase activity after procedure. Patient received enough pain relief from injections that the patient would like to repeat the injection(s). Patient states that she does not have to use abortive migraine medications until the last two to four weeks prior to Botox injections and sometimes the two weeks after receiving injections. Patient will take Fioricet first especially is she is not at home then she will take Imitrex. The Imitrex causes her to be very drowsy. Patient is behind on lumbar injections due to providers scheduled and her low back is bothering her a little more right now. 11/10/2020  presents for procedure follow-up. Patient had bilateral SI joint injections with lumbar trigger points on 7/1/2020, patient had cervical and thoracic trigger points on 7/15/2020, and patient had Botox injections on 9/2/2020.  Patient had at least 85% relief of pain from procedure(s) for at least 6 weeks and was tablet in afternoon and whole tablet at bedtime 60 tablet 2    HYDROcodone-acetaminophen (NORCO) 5-325 MG per tablet Take 1 tablet by mouth every 8 hours as needed for Pain for up to 30 days. 60 tablet 0    clonazePAM (KLONOPIN) 0.5 MG tablet Take 0.5 mg by mouth 2 times daily as needed.  SUMAtriptan (IMITREX) 50 MG tablet Take 1 tablet by mouth once as needed for Migraine 9 tablet 3    butalbital-acetaminophen-caffeine (FIORICET, ESGIC) -40 MG per tablet Take 1 tablet by mouth daily as needed for Headaches (may repeat in 4 hours x 1 no more than 2 per day) 30 tablet 2    FLUoxetine (PROZAC) 20 MG capsule Take 40 mg by mouth daily       ibuprofen (ADVIL;MOTRIN) 800 MG tablet Take 1 tablet by mouth 3 times daily as needed for Pain 90 tablet 2    Ubrogepant (UBRELVY) 100 MG TABS Take 100 mg by mouth daily as needed (migraines) 10 tablet 2    VYVANSE 30 MG capsule Take 1 capsule by mouth daily.  lidocaine (LIDODERM) 5 % Place 3 patches onto the skin daily 12 hours on, 12 hours off. 30 patch 2    zolpidem (AMBIEN) 10 MG tablet Take 1 tablet by mouth nightly. .  2    buPROPion (WELLBUTRIN XL) 300 MG extended release tablet Take 1 tablet by mouth daily  2    lamoTRIgine (LAMICTAL) 100 MG tablet Take 2.5 tablets by mouth nightly  2     No current facility-administered medications for this encounter. Allergies  Ultram [tramadol]    Current Medications  Current Outpatient Medications   Medication Sig Dispense Refill    baclofen (LIORESAL) 10 MG tablet 1/2 tablet in am, 1/2 tablet in afternoon and whole tablet at bedtime 60 tablet 2    HYDROcodone-acetaminophen (NORCO) 5-325 MG per tablet Take 1 tablet by mouth every 8 hours as needed for Pain for up to 30 days. 60 tablet 0    clonazePAM (KLONOPIN) 0.5 MG tablet Take 0.5 mg by mouth 2 times daily as needed.       SUMAtriptan (IMITREX) 50 MG tablet Take 1 tablet by mouth once as needed for Migraine 9 tablet 3    butalbital-acetaminophen-caffeine (FIORICET, ESGIC) -40 MG per tablet Take 1 tablet by mouth daily as needed for Headaches (may repeat in 4 hours x 1 no more than 2 per day) 30 tablet 2    FLUoxetine (PROZAC) 20 MG capsule Take 40 mg by mouth daily       ibuprofen (ADVIL;MOTRIN) 800 MG tablet Take 1 tablet by mouth 3 times daily as needed for Pain 90 tablet 2    Ubrogepant (UBRELVY) 100 MG TABS Take 100 mg by mouth daily as needed (migraines) 10 tablet 2    VYVANSE 30 MG capsule Take 1 capsule by mouth daily.  lidocaine (LIDODERM) 5 % Place 3 patches onto the skin daily 12 hours on, 12 hours off. 30 patch 2    zolpidem (AMBIEN) 10 MG tablet Take 1 tablet by mouth nightly. .  2    buPROPion (WELLBUTRIN XL) 300 MG extended release tablet Take 1 tablet by mouth daily  2    lamoTRIgine (LAMICTAL) 100 MG tablet Take 2.5 tablets by mouth nightly  2     No current facility-administered medications for this encounter. Social History    Social History     Socioeconomic History    Marital status:      Spouse name: None    Number of children: None    Years of education: None    Highest education level: None   Occupational History    None   Tobacco Use    Smoking status: Never Smoker    Smokeless tobacco: Never Used   Vaping Use    Vaping Use: Never used   Substance and Sexual Activity    Alcohol use: No    Drug use: No    Sexual activity: Yes     Partners: Male   Other Topics Concern    None   Social History Narrative    None     Social Determinants of Health     Financial Resource Strain:     Difficulty of Paying Living Expenses:    Food Insecurity:     Worried About Running Out of Food in the Last Year:     Ran Out of Food in the Last Year:    Transportation Needs:     Lack of Transportation (Medical):      Lack of Transportation (Non-Medical):    Physical Activity:     Days of Exercise per Week:     Minutes of Exercise per Session:    Stress:     Feeling of Stress : Social Connections:     Frequency of Communication with Friends and Family:     Frequency of Social Gatherings with Friends and Family:     Attends Jew Services:     Active Member of Clubs or Organizations:     Attends Club or Organization Meetings:     Marital Status:    Intimate Partner Violence:     Fear of Current or Ex-Partner:     Emotionally Abused:     Physically Abused:     Sexually Abused:          Family History  family history includes Cancer in her maternal aunt and another family member; High Blood Pressure in her father and sister. Review of Systems:  Review of Systems   Constitutional: Positive for activity change. Eyes: Positive for blurred vision, photophobia and visual disturbance. Gastrointestinal: Positive for nausea. Negative for constipation. Musculoskeletal: Positive for arthralgias, back pain, myalgias, neck pain and neck stiffness. Neurological: Positive for headaches. Psychiatric/Behavioral: Positive for sleep disturbance. Negative for agitation, self-injury and suicidal ideas. The patient is not nervous/anxious. 14 point ROS negative besides that noted in HPI    Physical exam:     Patient Vitals for the past 24 hrs:   BP Temp SpO2 Height Weight   05/27/21 1315 110/70 97 °F (36.1 °C) 100 % 5' 3\" (1.6 m) 132 lb (59.9 kg)       Body mass index is 23.38 kg/m². Physical Exam  Vitals and nursing note reviewed. Constitutional:       General: She is not in acute distress. Appearance: She is well-developed. HENT:      Head: Normocephalic. Right Ear: External ear normal.      Left Ear: External ear normal.      Nose: Nose normal.   Eyes:      Conjunctiva/sclera: Conjunctivae normal.      Pupils: Pupils are equal, round, and reactive to light. Cardiovascular:      Rate and Rhythm: Normal rate and regular rhythm. Pulmonary:      Effort: Pulmonary effort is normal.      Breath sounds: Normal breath sounds.    Abdominal:      General: Bowel sounds are normal.      Palpations: Abdomen is soft. Musculoskeletal:      Cervical back: Normal range of motion and neck supple. Spasms and tenderness present. Thoracic back: Spasms and tenderness present. Lumbar back: Spasms and tenderness present. Comments: Noted to have tender palpable knots noted in muscles of the cervical, thoracic and lumbar spine (trigger points identified)    Positive Bilateral  SI tenderness noted. Positive distraction, positive thigh thrust, positive Hector's Bilaterally   Skin:     General: Skin is warm and dry. Neurological:      Mental Status: She is alert and oriented to person, place, and time. Cranial Nerves: No cranial nerve deficit. Sensory: No sensory deficit. Deep Tendon Reflexes: Reflexes are normal and symmetric. Psychiatric:         Behavior: Behavior normal.         Thought Content: Thought content normal.         Judgment: Judgment normal.         LABS:     Lab Results   Component Value Date     08/27/2011    K 4.0 08/27/2011     08/27/2011    CO2 32 08/27/2011    BUN 10 08/27/2011    CREATININE 0.8 08/27/2011    GLUCOSE 94 08/27/2011    CALCIUM 9.3 08/27/2011        Lab Results   Component Value Date    WBC 6.87 08/27/2011    HGB 12.6 08/27/2011    HCT 38.0 08/27/2011    MCV 91.1 08/27/2011     08/27/2011       Assessment:                                                                                                                                        Active Problems:    Restless leg syndrome    Chiari malformation type I (HCC)    Intractable migraine without aura and without status migrainosus    Myofascial pain    Bilateral occipital neuralgia    SI (sacroiliac) joint dysfunction    Chronic bilateral low back pain without sciatica    Pain management contract agreement  Resolved Problems:    * No resolved hospital problems.  *      PLAN:  Myofascial pain  - baclofen (LIORESAL) 10 MG tablet; 1/2 tablet in am, 1/2 tablet in afternoon and whole tablet at bedtime  Dispense: 60 tablet; Refill: 2    Stop - cyclobenzaprine (FLEXERIL) 5 MG tablet; Take 1 tablet by mouth 3 times daily as needed for Muscle spasms  Dispense: 90 tablet; Refill: 2    Intractable migraine without aura and without status migrainosus  Continue - SUMAtriptan (IMITREX) 50 MG tablet; Take 1 tablet by mouth once as needed for Migraine  Dispense: 9 tablet; Refill: 3    Continue - butalbital-acetaminophen-caffeine (FIORICET, ESGIC) -40 MG per tablet; Take 1 tablet by mouth daily as needed for Headaches (may repeat in 4 hours x 1 no more than 2 per day)  Dispense: 30 tablet; Refill: 2    Chronic bilateral low back pain without sciatica  Sent to be filled 6/27/2021 - HYDROcodone-acetaminophen (NORCO) 5-325 MG per tablet; Take 1 tablet by mouth every 8 hours as needed for Pain for up to 30 days. Take lowest dose possible to manage pain  Dispense: 60 tablet; Refill: 0    Myofascial pain stop ineffectiveness- methocarbamol (ROBAXIN-750) 750 MG tablet; Take 1 tablet by mouth 3 times daily  Dispense: 90 tablet; Refill: 2    Will attempt to reorder Botox for migraines and get approved. Patient obtained at least a 50 % reduction in mean headache days per month with a decrease in severity, in intensity and in aggravating symptoms compared to pre-treatment. Patient had over 100 hours of migraine freedom per month with Botox. Continue Motrin no refill needed patient takes sparingly no refill needed. Repeat Bilateral cervical and thoracic trigger point injections as patient has had good response in the past.    Repeat bilateral lumbar trigger point injections as patient has had good response with those in the past.    Patient to call when she feels she needs to repeat bilateral SI injections in the future as patient does not feel they need to be repeated at this time as they are still effective.     [x] Follow up    [] 4 weeks   [x] 6-8 weeks   [] 10-12 weeks   [] 3 months  [x] Post procedure to evaluate effectiveness of treatment  [] To evaluate medications changes made at office visit. [] To review diagnostics ordered at last visit. [] To evaluate response to therapy    [x] For management of controlled substance  [x] Random UDS as indicated by ORT score or if indicated by abberent behaviors    Discussion: Discussed exam findings and plan of care with patient. Patient agreed with POC and questions were asked and answered. Activity: discussed exercise as beneficial to pain reduction, encouraged stretching exercise with a focus on torso strengthening. Goals:  Pain Management Goals of Therapy:   [] Resolution in pain  [x] Decrease in pain level  [x] Improvement in ADL's  [x] Increase in activities with less pain  [] Decrease in medication      Controlled substance monitoring:    [x] Discussed medication side effects, risk of tolerance and/or dependence, and/or alternative treatment  [] Discussed the detrimental effects of long term narcotic use in younger patients especially women of childbearing years.   [x] No signs and symptoms of potential drug abuse or diversion were identified  [] Signs of potential drug abuse or diversion were identified   [] ORT Score   [] UDS non-compliant   [] See Note  [] Random urine drug screen sent today  [x] Random urine drug screen not completed today   [] Deferred New Patient  [x] Compliant 11/10/2020  [] Not Compliant see note  [] Medication agreement with provider signed today 6/22/2020  [x] Medication agreement with provider on file under media   [] Medication regimen effective with c/o of side effects noted and continued   [] New patient continuing current medication while developing POC   [x] On going assessment and evaluation of medication regimen  [] Medication regimen not effective see plan for changes  [x] Shlomo Peacock reviewed & on file under media     CC:  MAGGIE SANTOS, CALI Rivera, APRN - CNP,

## 2021-06-09 ENCOUNTER — HOSPITAL ENCOUNTER (OUTPATIENT)
Dept: PAIN MANAGEMENT | Age: 46
Discharge: HOME | End: 2021-06-09
Payer: MEDICARE

## 2021-06-09 VITALS
SYSTOLIC BLOOD PRESSURE: 129 MMHG | DIASTOLIC BLOOD PRESSURE: 66 MMHG | TEMPERATURE: 98.3 F | RESPIRATION RATE: 18 BRPM | OXYGEN SATURATION: 100 % | HEART RATE: 83 BPM

## 2021-06-09 PROCEDURE — 64615 CHEMODENERV MUSC MIGRAINE: CPT | Performed by: NURSE PRACTITIONER

## 2021-06-09 PROCEDURE — 64615 CHEMODENERV MUSC MIGRAINE: CPT

## 2021-06-09 PROCEDURE — 2580000003 HC RX 258

## 2021-06-09 PROCEDURE — 6360000002 HC RX W HCPCS

## 2021-06-09 RX ORDER — SODIUM CHLORIDE 9 MG/ML
4.5 INJECTION INTRAVENOUS ONCE
Status: DISCONTINUED | OUTPATIENT
Start: 2021-06-09 | End: 2021-06-11 | Stop reason: HOSPADM

## 2021-06-09 NOTE — PROCEDURES
[address]    Patient Name: Delores Squires    : 1975                    Age: 51 y.o. Sex: female    Date: 2021    Pre-op Diagnosis: Chronic Migraines    Post-op Diagnosis: Chronic Migraines    Procedure: Botox injections x 155 units (45 units wasted)    Performing Procedure: AMNA Nelson    Patient Vitals for the past 24 hrs:   BP Temp Temp src Pulse Resp SpO2   21 0940 129/66 98.3 °F (36.8 °C) Temporal 83 18 100 %       Previous Injections:  Patient had 30/16 headaches/migraines over the past month. Patient is behind on injections due to insurance denial.    Indications:    Delores Squires has been treated for problems with chronic migraine headaches. The patient was taken through a conservative course of treatment without complete resolution of symptoms. Botox injection therapy was offered and after the risks and benefits of the procedure were explained to the patient, we had agreed to proceed. The patient was taken to the procedure room and placed in the seated position. The following muscles were identified using palpation and standard landmarks. These muscles were marked and prepped with alcohol. A total of 155 units were injected after careful aspiration and the following distribution bilaterally:  10 units in 2 sites, procerus 5 units in one site, frontalis 20 units in 4 sites, temporalis 40 unit in 8 sites, occipitals 30 units in 6 sites, cervical paraspinals 20 units in 4 sites, and trapezius 30 units in 6 sites. Discharge: The patient tolerated the procedure well. There were no complications during the procedure and the patient was discharged home with discharge instructions. The patient has been instructed to contact the office should there be any complications or questions to arise between today and their next appointment. Plan:    Patient to follow up in the office in about 6 weeks.      Karan Frost, AMNA - CNP, 6/9/2021 at 9:57 AM

## 2021-06-09 NOTE — PROGRESS NOTES
Patient states that he/she has __30____ headaches out of 30 days each month.   Patient states that he/she has __16___ migraines out of 30 days each month.monthly

## 2021-06-16 ENCOUNTER — TELEPHONE (OUTPATIENT)
Dept: PAIN MANAGEMENT | Age: 46
End: 2021-06-16

## 2021-06-16 DIAGNOSIS — M79.18 MYOFASCIAL PAIN: ICD-10-CM

## 2021-06-21 DIAGNOSIS — G89.29 CHRONIC BILATERAL LOW BACK PAIN WITHOUT SCIATICA: Chronic | ICD-10-CM

## 2021-06-21 DIAGNOSIS — M54.50 CHRONIC BILATERAL LOW BACK PAIN WITHOUT SCIATICA: Chronic | ICD-10-CM

## 2021-06-21 RX ORDER — HYDROCODONE BITARTRATE AND ACETAMINOPHEN 5; 325 MG/1; MG/1
1 TABLET ORAL EVERY 8 HOURS PRN
Qty: 60 TABLET | Refills: 0 | Status: SHIPPED | OUTPATIENT
Start: 2021-06-21 | End: 2021-06-22

## 2021-06-22 DIAGNOSIS — M54.50 CHRONIC BILATERAL LOW BACK PAIN WITHOUT SCIATICA: Chronic | ICD-10-CM

## 2021-06-22 DIAGNOSIS — G89.29 CHRONIC BILATERAL LOW BACK PAIN WITHOUT SCIATICA: Chronic | ICD-10-CM

## 2021-06-22 NOTE — TELEPHONE ENCOUNTER
Pharmacy called stating that they are unable to access script sent yesterday. Pharmacy has gotten a new system and asked for us to route a new script today.

## 2021-06-27 RX ORDER — HYDROCODONE BITARTRATE AND ACETAMINOPHEN 5; 325 MG/1; MG/1
1 TABLET ORAL EVERY 8 HOURS PRN
Qty: 60 TABLET | Refills: 0 | Status: SHIPPED | OUTPATIENT
Start: 2021-06-27 | End: 2021-07-28 | Stop reason: SDUPTHER

## 2021-06-30 ENCOUNTER — HOSPITAL ENCOUNTER (OUTPATIENT)
Dept: PAIN MANAGEMENT | Age: 46
Discharge: HOME | End: 2021-06-30
Payer: MEDICARE

## 2021-06-30 VITALS
RESPIRATION RATE: 18 BRPM | HEART RATE: 87 BPM | TEMPERATURE: 97.1 F | OXYGEN SATURATION: 99 % | SYSTOLIC BLOOD PRESSURE: 118 MMHG | DIASTOLIC BLOOD PRESSURE: 70 MMHG

## 2021-06-30 DIAGNOSIS — M79.18 MYOFASCIAL PAIN: ICD-10-CM

## 2021-06-30 PROCEDURE — 2500000003 HC RX 250 WO HCPCS

## 2021-06-30 PROCEDURE — 20553 NJX 1/MLT TRIGGER POINTS 3/>: CPT

## 2021-06-30 PROCEDURE — 20553 NJX 1/MLT TRIGGER POINTS 3/>: CPT | Performed by: NURSE PRACTITIONER

## 2021-06-30 RX ORDER — LIDOCAINE HYDROCHLORIDE 10 MG/ML
15 INJECTION, SOLUTION EPIDURAL; INFILTRATION; INTRACAUDAL; PERINEURAL ONCE
Status: DISCONTINUED | OUTPATIENT
Start: 2021-06-30 | End: 2021-07-02 | Stop reason: HOSPADM

## 2021-06-30 RX ORDER — BUPIVACAINE HYDROCHLORIDE 5 MG/ML
15 INJECTION, SOLUTION EPIDURAL; INTRACAUDAL ONCE
Status: DISCONTINUED | OUTPATIENT
Start: 2021-06-30 | End: 2021-07-02 | Stop reason: HOSPADM

## 2021-07-01 NOTE — PROCEDURES
[facility]    Patient Name: Nina Zhao    : 1975                    Age: 51 y.o. Sex: female    Date: 2021    Pre-op Diagnosis: Myofascial Pain/ Muscle Spasms/ Cervicalgia    Post-op Diagnosis: Myofascial Pain/ Muscle Spasms/ Cervicalgia    Procedure: Cervical Trigger Point Injections    Performing Procedure: SANDRO Summers - BC, VA-BC    Patient Vitals for the past 24 hrs:   BP Temp Temp src Pulse Resp SpO2   21 1409 118/70 97.1 °F (36.2 °C) Temporal 87 18 99 %       Description of Procedure:    After a brief physical assessment and failure to improve with conservative measures the patient presented for Cervical Trigger Point Injections The indications, limitations and possible complications were discussed with the patient and the patient elected to proceed with the procedure. After voluntary, informed and signed consent obtained the patient was placed in a seated position. Appropriate time out was obtained per policy. The area of maximal tenderness was palpated over the   bilaterally Cervical muscles - Splenius  Trapezius  Rhomboid The skin overlying these areas was marked with a skin marker. The skin overlying the proposed injection sites were then sprayed with Gebauer's Solution while protecting patient eyes. The areas were prepped using aseptic technique with CHG prep. Each trigger point of the Cervical muscles - Splenius  Trapezius  Rhomboid was injected with approximately 1-2 ml of a solution of 5 ml of 1% Lidocaine Plain and 5 ml of 0.5% Marcaine Plain after negative aspiration    Discharge: The patient tolerated the procedure well. There were no complications during the procedure and the patient was discharged home with discharge instructions. The patient has been instructed to contact the office should there be any complications or questions to arise between today and their next appointment.     Plan:    Patient to follow up in the office in about 6 weeks.      Beaumont Medico, APRN - CNP, 6/30/2021 at 8:26 PM

## 2021-07-08 ENCOUNTER — TELEPHONE (OUTPATIENT)
Dept: PAIN MANAGEMENT | Age: 46
End: 2021-07-08

## 2021-07-08 DIAGNOSIS — M79.18 MYOFASCIAL PAIN: ICD-10-CM

## 2021-07-14 ENCOUNTER — HOSPITAL ENCOUNTER (OUTPATIENT)
Dept: PAIN MANAGEMENT | Age: 46
Discharge: HOME | End: 2021-07-14
Payer: MEDICARE

## 2021-07-14 VITALS
OXYGEN SATURATION: 100 % | HEART RATE: 80 BPM | RESPIRATION RATE: 18 BRPM | TEMPERATURE: 97.7 F | SYSTOLIC BLOOD PRESSURE: 116 MMHG | DIASTOLIC BLOOD PRESSURE: 58 MMHG

## 2021-07-14 PROCEDURE — 20553 NJX 1/MLT TRIGGER POINTS 3/>: CPT

## 2021-07-14 PROCEDURE — 2500000003 HC RX 250 WO HCPCS

## 2021-07-14 PROCEDURE — 20553 NJX 1/MLT TRIGGER POINTS 3/>: CPT | Performed by: NURSE PRACTITIONER

## 2021-07-14 RX ORDER — BUPIVACAINE HYDROCHLORIDE 5 MG/ML
10 INJECTION, SOLUTION EPIDURAL; INTRACAUDAL ONCE
Status: DISCONTINUED | OUTPATIENT
Start: 2021-07-14 | End: 2021-07-16 | Stop reason: HOSPADM

## 2021-07-14 RX ORDER — LIDOCAINE HYDROCHLORIDE 10 MG/ML
10 INJECTION, SOLUTION EPIDURAL; INFILTRATION; INTRACAUDAL; PERINEURAL ONCE
Status: DISCONTINUED | OUTPATIENT
Start: 2021-07-14 | End: 2021-07-16 | Stop reason: HOSPADM

## 2021-07-14 NOTE — PROCEDURES
[facility] Physical [facility] Pain [facility]    Patient Name: Steven Palomino    : 1975                    Age: 51 y.o. Sex: female    Date: 2021    Pre-op Diagnosis: Myofascial Pain/ Muscle Spasms    Post-op Diagnosis: Myofascial Pain/ Muscle Spasms    Procedure: Lumbar Trigger Point Injections    Performing Procedure: NEDA RodriguezP - BC; VA-BC    Patient Vitals for the past 24 hrs:   BP Temp Temp src Pulse Resp SpO2   21 1311 (!) 116/58 97.7 °F (36.5 °C) Temporal 80 18 100 %       Description of Procedure:    After a brief physical assessment and failure to improve with conservative measures the patient presented for Lumbar Trigger Point Injections The indications, limitations and possible complications were discussed with the patient and the patient elected to proceed with the procedure. After voluntary, informed and signed consent obtained the patient was placed in a seated position. Appropriate time out was obtained per policy. The area of maximal tenderness was palpated over the bilaterally Lumbar Muscles - Lower Latissimus,  Erector Spinae, Lumbar Paraspinous. The skin overlying these areas was marked with a skin marker. The areas were prepped using aseptic technique with CHG prep. The skin overlying the proposed injection sites were then sprayed with Gebauer's Solution while protecting patient eyes. Each trigger point of the Lumbar Muscles - Lower Latissimus,  Erector Spinae, Lumbar Paraspinous was injected after negative aspiration was injected with approximately 1-2 ml of a solution of 5 ml of 1% Lidocaine Plain and 5 ml of 0.5% Marcaine Plain after negative aspiration    Discharge: The patient tolerated the procedure well. There were no complications during the procedure and the patient was discharged home with discharge instructions.     The patient has been instructed to contact the office should there be any complications or questions to arise between today and their next appointment. Plan:     Will return to the office in 6 weeks for follow up    AMNA Ross CNP, 7/14/2021 at 2:26 PM

## 2021-07-21 ENCOUNTER — TELEPHONE (OUTPATIENT)
Dept: PAIN MANAGEMENT | Age: 46
End: 2021-07-21

## 2021-07-21 DIAGNOSIS — M79.18 MYOFASCIAL PAIN: ICD-10-CM

## 2021-07-28 DIAGNOSIS — G89.29 CHRONIC BILATERAL LOW BACK PAIN WITHOUT SCIATICA: Chronic | ICD-10-CM

## 2021-07-28 DIAGNOSIS — M54.50 CHRONIC BILATERAL LOW BACK PAIN WITHOUT SCIATICA: Chronic | ICD-10-CM

## 2021-07-30 RX ORDER — HYDROCODONE BITARTRATE AND ACETAMINOPHEN 5; 325 MG/1; MG/1
1 TABLET ORAL EVERY 8 HOURS PRN
Qty: 60 TABLET | Refills: 0 | Status: SHIPPED | OUTPATIENT
Start: 2021-07-30 | End: 2021-08-31 | Stop reason: SDUPTHER

## 2021-08-31 DIAGNOSIS — M54.50 CHRONIC BILATERAL LOW BACK PAIN WITHOUT SCIATICA: Chronic | ICD-10-CM

## 2021-08-31 DIAGNOSIS — G89.29 CHRONIC BILATERAL LOW BACK PAIN WITHOUT SCIATICA: Chronic | ICD-10-CM

## 2021-08-31 RX ORDER — HYDROCODONE BITARTRATE AND ACETAMINOPHEN 5; 325 MG/1; MG/1
1 TABLET ORAL EVERY 8 HOURS PRN
Qty: 60 TABLET | Refills: 0 | Status: SHIPPED | OUTPATIENT
Start: 2021-08-31 | End: 2021-09-09 | Stop reason: SDUPTHER

## 2021-09-09 ENCOUNTER — HOSPITAL ENCOUNTER (OUTPATIENT)
Dept: PAIN MANAGEMENT | Age: 46
Discharge: HOME | End: 2021-09-09
Payer: MEDICARE

## 2021-09-09 VITALS
TEMPERATURE: 98 F | DIASTOLIC BLOOD PRESSURE: 69 MMHG | OXYGEN SATURATION: 100 % | HEART RATE: 84 BPM | SYSTOLIC BLOOD PRESSURE: 114 MMHG

## 2021-09-09 DIAGNOSIS — M54.50 CHRONIC BILATERAL LOW BACK PAIN WITHOUT SCIATICA: Chronic | ICD-10-CM

## 2021-09-09 DIAGNOSIS — M79.18 MYOFASCIAL PAIN: ICD-10-CM

## 2021-09-09 DIAGNOSIS — G89.29 CHRONIC BILATERAL LOW BACK PAIN WITHOUT SCIATICA: Chronic | ICD-10-CM

## 2021-09-09 PROCEDURE — 99213 OFFICE O/P EST LOW 20 MIN: CPT | Performed by: NURSE PRACTITIONER

## 2021-09-09 PROCEDURE — 99213 OFFICE O/P EST LOW 20 MIN: CPT

## 2021-09-09 RX ORDER — ALPRAZOLAM 0.25 MG/1
TABLET ORAL
COMMUNITY
Start: 2021-07-07 | End: 2022-04-28

## 2021-09-09 RX ORDER — IBUPROFEN 800 MG/1
800 TABLET ORAL 3 TIMES DAILY PRN
Qty: 90 TABLET | Refills: 2 | Status: SHIPPED | OUTPATIENT
Start: 2021-09-09 | End: 2021-12-28 | Stop reason: SDUPTHER

## 2021-09-09 RX ORDER — HYDROCODONE BITARTRATE AND ACETAMINOPHEN 5; 325 MG/1; MG/1
1 TABLET ORAL EVERY 8 HOURS PRN
Qty: 60 TABLET | Refills: 0 | Status: SHIPPED | OUTPATIENT
Start: 2021-09-30 | End: 2021-11-02 | Stop reason: SDUPTHER

## 2021-09-09 ASSESSMENT — ENCOUNTER SYMPTOMS
BLURRED VISION: 1
VISUAL CHANGE: 1
NAUSEA: 1
CONSTIPATION: 0
PHOTOPHOBIA: 1
SCALP TENDERNESS: 1
BACK PAIN: 1

## 2021-09-09 ASSESSMENT — PAIN SCALES - GENERAL: PAINLEVEL_OUTOF10: 5

## 2021-09-09 NOTE — PROGRESS NOTES
and a visual change. The symptoms are aggravated by bright light, activity, emotional stress and noise. Neck Pain   This is a chronic problem. The current episode started more than 1 year ago. The problem occurs constantly. The problem has been waxing and waning. The quality of the pain is described as aching and cramping. Associated symptoms include headaches, photophobia and a visual change. Screening Tools:    PEG Score: 4     Last PEG Score: 4     Annual ORT Score: 4     Annual PHQ Score: 5     Current Pain Assessment  Pain Assessment  Pain Assessment: 0-10  Pain Level: 5    Past Visit HPI:  3/1/2021  presents for follow up of procedure. Patient had Lumbar trigger point injections on 11/18/2020, Cervical and thoracic trigger point injections on 1/6/2021, and Botox for Migraines on 1/13/2021. Patient had at least 85% relief of pain from procedure(s) for at least 6 weeks and was able to increase activity after procedure. Patient received enough pain relief from injections that the patient would like to repeat the injection(s). Patient states that she does not have to use abortive migraine medications until the last two to four weeks prior to Botox injections and sometimes the two weeks after receiving injections. Patient will take Fioricet first especially is she is not at home then she will take Imitrex. The Imitrex causes her to be very drowsy. Patient is behind on lumbar injections due to providers scheduled and her low back is bothering her a little more right now. 11/10/2020  presents for procedure follow-up. Patient had bilateral SI joint injections with lumbar trigger points on 7/1/2020, patient had cervical and thoracic trigger points on 7/15/2020, and patient had Botox injections on 9/2/2020. Patient had at least 85% relief of pain from procedure(s) for at least 6 weeks and was able to increase activity after procedure.  Patient received enough pain relief from injections that the patient would like to repeat the injection(s).    6/22/2020  presents to the office for annual exam with primary complaints of chronic headaches and back pain. Patient was referred to this office last April 2018 with a past history of chiari malformation. Patient had a posterior fossa decompression and her pain had pretty much resolved for approximately 3 years. However during the year prior to coming back to this office patient started developing pain again. She started having chronic headaches, along with myofascial pain in the shoulders and thoracic spine. Through the years patient has been on numerous medications to treat headaches including the following but not limited to Topamax, amitriptyline, propranolol, Imitrex, gabapentin, and Lyrica, Fioricet does help with the intense headaches but he does not make headaches tolerable. She does follow with neurology and she has been started on Aimovig which helps. Patient had been getting bilateral occipital nerve blocks along with trigger point injections in her cervical, thoracic, and lumbar spine. Patient was changed to botox injections in October 2019. Patient has had excellent results from botox injections. Patient did have a flare up of low back pain. She has had SI injections which have made low back pain improve. Past Medical History  Past Medical History:   Diagnosis Date    ADHD (attention deficit hyperactivity disorder)     Anemia     Anxiety     Cervicalgia     Chiari malformation type I ([facility] [address]     s/p decompression 2014    Chronic back pain     Depression     Headache(784.0)     Hiatal hernia     Hyperlipidemia     Indigestion     Obstructive sleep apnea     Mild with an AHI:  7.5 and  RDI:  14.7       Medications  Current Outpatient Medications   Medication Sig Dispense Refill    HYDROcodone-acetaminophen (NORCO) 5-325 MG per tablet Take 1 tablet by mouth every 8 hours as needed for Pain for up to 30 days.  60 tablet 0    baclofen (LIORESAL) 10 MG tablet 1/2 tablet in am, 1/2 tablet in afternoon and whole tablet at bedtime 60 tablet 2    SUMAtriptan (IMITREX) 50 MG tablet Take 1 tablet by mouth once as needed for Migraine 9 tablet 3    butalbital-acetaminophen-caffeine (FIORICET, ESGIC) -40 MG per tablet Take 1 tablet by mouth daily as needed for Headaches (may repeat in 4 hours x 1 no more than 2 per day) 30 tablet 2    FLUoxetine (PROZAC) 20 MG capsule Take 40 mg by mouth daily       ibuprofen (ADVIL;MOTRIN) 800 MG tablet Take 1 tablet by mouth 3 times daily as needed for Pain 90 tablet 2    Ubrogepant (UBRELVY) 100 MG TABS Take 100 mg by mouth daily as needed (migraines) 10 tablet 2    VYVANSE 30 MG capsule Take 1 capsule by mouth daily.  zolpidem (AMBIEN) 10 MG tablet Take 1 tablet by mouth nightly. .  2    lamoTRIgine (LAMICTAL) 100 MG tablet Take 2.5 tablets by mouth nightly  2    ALPRAZolam (XANAX) 0.25 MG tablet       lidocaine (LIDODERM) 5 % Place 3 patches onto the skin daily 12 hours on, 12 hours off. 30 patch 2     No current facility-administered medications for this encounter. Allergies  Ultram [tramadol]    Current Medications  Current Outpatient Medications   Medication Sig Dispense Refill    HYDROcodone-acetaminophen (NORCO) 5-325 MG per tablet Take 1 tablet by mouth every 8 hours as needed for Pain for up to 30 days.  60 tablet 0    baclofen (LIORESAL) 10 MG tablet 1/2 tablet in am, 1/2 tablet in afternoon and whole tablet at bedtime 60 tablet 2    SUMAtriptan (IMITREX) 50 MG tablet Take 1 tablet by mouth once as needed for Migraine 9 tablet 3    butalbital-acetaminophen-caffeine (FIORICET, ESGIC) -40 MG per tablet Take 1 tablet by mouth daily as needed for Headaches (may repeat in 4 hours x 1 no more than 2 per day) 30 tablet 2    FLUoxetine (PROZAC) 20 MG capsule Take 40 mg by mouth daily       ibuprofen (ADVIL;MOTRIN) 800 MG tablet Take 1 tablet by mouth 3 times daily as needed for Pain 90 tablet 2    Ubrogepant (UBRELVY) 100 MG TABS Take 100 mg by mouth daily as needed (migraines) 10 tablet 2    VYVANSE 30 MG capsule Take 1 capsule by mouth daily.  zolpidem (AMBIEN) 10 MG tablet Take 1 tablet by mouth nightly. .  2    lamoTRIgine (LAMICTAL) 100 MG tablet Take 2.5 tablets by mouth nightly  2    ALPRAZolam (XANAX) 0.25 MG tablet       lidocaine (LIDODERM) 5 % Place 3 patches onto the skin daily 12 hours on, 12 hours off. 30 patch 2     No current facility-administered medications for this encounter. Social History    Social History     Socioeconomic History    Marital status:      Spouse name: None    Number of children: None    Years of education: None    Highest education level: None   Occupational History    None   Tobacco Use    Smoking status: Never Smoker    Smokeless tobacco: Never Used   Vaping Use    Vaping Use: Never used   Substance and Sexual Activity    Alcohol use: No    Drug use: No    Sexual activity: Yes     Partners: Male   Other Topics Concern    None   Social History Narrative    None     Social Determinants of Health     Financial Resource Strain:     Difficulty of Paying Living Expenses:    Food Insecurity:     Worried About Running Out of Food in the Last Year:     Ran Out of Food in the Last Year:    Transportation Needs:     Lack of Transportation (Medical):      Lack of Transportation (Non-Medical):    Physical Activity:     Days of Exercise per Week:     Minutes of Exercise per Session:    Stress:     Feeling of Stress :    Social Connections:     Frequency of Communication with Friends and Family:     Frequency of Social Gatherings with Friends and Family:     Attends [employer] Services:     Active Member of Clubs or Organizations:     Attends Club or Organization Meetings:     Marital Status:    Intimate Partner Violence:     Fear of Current or Ex-Partner:     Emotionally Abused:     Physically Abused:     Sexually Abused: Family History  family history includes Cancer in her maternal aunt and another family member; High Blood Pressure in her father and sister. Review of Systems:  Review of Systems   Constitutional: Positive for activity change. Eyes: Positive for blurred vision, photophobia and visual disturbance. Gastrointestinal: Positive for nausea. Negative for constipation. Musculoskeletal: Positive for arthralgias, back pain, myalgias, neck pain and neck stiffness. Neurological: Positive for headaches. Psychiatric/Behavioral: Positive for sleep disturbance. Negative for agitation, self-injury and suicidal ideas. The patient is not nervous/anxious. 14 point ROS negative besides that noted in HPI    Physical exam:     Patient Vitals for the past 24 hrs:   BP Temp Pulse SpO2   09/09/21 1300 114/69 98 °F (36.7 °C) 84 100 %       There is no height or weight on file to calculate BMI. Physical Exam  Vitals and nursing note reviewed. Constitutional:       General: She is not in acute distress. Appearance: She is well-developed. HENT:      Head: Normocephalic. Right Ear: External ear normal.      Left Ear: External ear normal.      Nose: Nose normal.   Eyes:      Conjunctiva/sclera: Conjunctivae normal.      Pupils: Pupils are equal, round, and reactive to light. Cardiovascular:      Rate and Rhythm: Normal rate and regular rhythm. Pulmonary:      Effort: Pulmonary effort is normal.      Breath sounds: Normal breath sounds. Abdominal:      General: Bowel sounds are normal.      Palpations: Abdomen is soft. Musculoskeletal:      Cervical back: Normal range of motion and neck supple. Spasms (Noted to have tender palpable knots noted in muscles ) and tenderness present. Thoracic back: Spasms (Noted to have tender palpable knots noted in muscles ) and tenderness present.       Lumbar back: Spasms (Noted to have tender palpable knots noted in muscles ) and tenderness (Positive Bilateral  SI tenderness noted. 3 + provacative test) present. Skin:     General: Skin is warm and dry. Neurological:      Mental Status: She is alert and oriented to person, place, and time. Cranial Nerves: No cranial nerve deficit. Sensory: No sensory deficit. Deep Tendon Reflexes: Reflexes are normal and symmetric. Psychiatric:         Behavior: Behavior normal.         Thought Content: Thought content normal.         Judgment: Judgment normal.         LABS:     Lab Results   Component Value Date     08/27/2011    K 4.0 08/27/2011     08/27/2011    CO2 32 08/27/2011    BUN 10 08/27/2011    CREATININE 0.8 08/27/2011    GLUCOSE 94 08/27/2011    CALCIUM 9.3 08/27/2011        Lab Results   Component Value Date    WBC 6.87 08/27/2011    HGB 12.6 08/27/2011    HCT 38.0 08/27/2011    MCV 91.1 08/27/2011     08/27/2011       Assessment:                                                                                                                                        Active Problems:    Restless leg syndrome    Chiari malformation type I (HCC)    Intractable migraine without aura and without status migrainosus    Myofascial pain    Bilateral occipital neuralgia    SI (sacroiliac) joint dysfunction    Chronic bilateral low back pain without sciatica    Pain management contract agreement  Resolved Problems:    * No resolved hospital problems. *      PLAN:  Myofascial pain  Schedule cervical and thoracic trigger point injections for myofascial pain as patient has had good response in the past.    Intractable migraine without aura and without status migrainosus  Continue no refill needed- SUMAtriptan (IMITREX) 50 MG tablet; Take 1 tablet by mouth once as needed for Migraine  Dispense: 9 tablet; Refill: 3    Continue no refill needed- butalbital-acetaminophen-caffeine (FIORICET, ESGIC) -40 MG per tablet;  Take 1 tablet by mouth daily as needed for Headaches (may repeat in 4 hours x 1 no more than 2 per day)  Dispense: 30 tablet; Refill: 2    Continue Botox for migraine. Patient obtained 50% reduction in mean headache days per month with a decrease in severity, intensity, and aggravating symptoms as compared with pretreatment and patient had over 100 hours of migraine freedom per month after Botox    Chronic bilateral low back pain without sciatica  Continue with prescription refill sent with fill date of 9/30/2021- HYDROcodone-acetaminophen (NORCO) 5-325 MG per tablet; Take 1 tablet by mouth every 8 hours as needed for Pain for up to 30 days. Take lowest dose possible to manage pain  Dispense: 60 tablet; Refill: 0    Continue Motrin no refill needed patient takes sparingly no refill needed. Schedule bilateral SI joint injections with ultrasound as patient has had good response with these before. [x] Follow up    [] 4 weeks   [x] 6-8 weeks   [] 10-12 weeks   [] 3 months  [x] Post procedure to evaluate effectiveness of treatment  [] To evaluate medications changes made at office visit. [] To review diagnostics ordered at last visit. [] To evaluate response to therapy    [x] For management of controlled substance  [x] Random UDS as indicated by ORT score or if indicated by abberent behaviors    Discussion: Discussed exam findings and plan of care with patient. Patient agreed with POC and questions were asked and answered. Activity: discussed exercise as beneficial to pain reduction, encouraged stretching exercise with a focus on torso strengthening.     Goals:  Pain Management Goals of Therapy:   [] Resolution in pain  [x] Decrease in pain level  [x] Improvement in ADL's  [x] Increase in activities with less pain  [] Decrease in medication      Controlled substance monitoring:    [x] Discussed medication side effects, risk of tolerance and/or dependence, and/or alternative treatment  [] Discussed the detrimental effects of long term narcotic use in younger patients especially women of childbearing years. [x] No signs and symptoms of potential drug abuse or diversion were identified  [] Signs of potential drug abuse or diversion were identified   [] ORT Score   [] UDS non-compliant   [] See Note  [x] Random urine drug screen sent today  [] Random urine drug screen not completed today   [] Deferred New Patient  [x] Compliant 11/10/2020  [] Not Compliant see note  [] Medication agreement with provider signed today 6/22/2020  [x] Medication agreement with provider on file under media   [] Medication regimen effective with c/o of side effects noted and continued   [] New patient continuing current medication while developing POC   [x] On going assessment and evaluation of medication regimen  [] Medication regimen not effective see plan for changes  [x] Colby Schaumann reviewed & on file under media     CC:  CALI RAMIREZ, APRN - CNP, 9/9/2021 at 1:19 PM    EMR dragon/transcription disclaimer: Much of this encounter note is electronic transcription/translation of spoken language to printed tach. Electronic translation of spoken language may be erroneous, or at times, nonsensical words or phrases may be inadvertently transcribed.  Although, I have reviewed the note for such errors, some may still exist.

## 2021-09-09 NOTE — PROGRESS NOTES
Clinic Documentation      Education Provided:  [x] Review of Yuliya Tejeda  [] Agreement Review  [x] PEG Score Calculated [] PHQ Score Calculated [] ORT Score Calculated    [] Compliance Issues Discussed [] Cognitive Behavior Needs [x] Exercise [] Review of Test [] Financial Issues  [x] Tobacco/Alcohol Use Reviewed [x] Teaching [] New Patient [] Picture Obtained    Physician Plan:  [] Outgoing Referral  [] Pharmacy Consult  [] Test Ordered [x] Prescription Ordered/Changed   [] Obtained Test Results / Consult Notes        Complete if patient is withholding blood thinner for procedure     Blood Thinner Patient is currently taking:      [] Plavix (Hold for 7 days)  [] Aspirin (Hold for 5 days)     [] Pletal (Hold for 2 days)  [] Pradaxa (Hold for 3 days)    [] Effient (Hold for 7 days)  [] Xarelto (Hold for 2 days)    [] Eliquis (Hold for 2 days)  [] Brilinta (Hold for 7 days)    [] Coumadin (Hold for 5 days) - (INR needs to be drawn the day prior to procedure- INR < 2.0)    [] Aggrenox (Hold for 7 days)        [] Patient will stop medication on their own.    [] Blood Thinner Form Faxed for approval to hold.    Provider form faxed to:    Assessment Completed by:  Electronically signed by Alvaro Vallejo on 9/9/2021 at 12:59 PM

## 2021-09-09 NOTE — TELEPHONE ENCOUNTER
Pt slept more than 6 hours on most rounds. No observed or reported s/s of distress. Will continue to monitor.    Refill at office

## 2021-09-30 DIAGNOSIS — G43.019 INTRACTABLE MIGRAINE WITHOUT AURA AND WITHOUT STATUS MIGRAINOSUS: Chronic | ICD-10-CM

## 2021-10-03 RX ORDER — SUMATRIPTAN 50 MG/1
50 TABLET, FILM COATED ORAL
Qty: 9 TABLET | Refills: 3 | Status: SHIPPED | OUTPATIENT
Start: 2021-10-03 | End: 2021-12-28 | Stop reason: SDUPTHER

## 2021-10-06 ENCOUNTER — HOSPITAL ENCOUNTER (OUTPATIENT)
Dept: PAIN MANAGEMENT | Age: 46
Discharge: HOME | End: 2021-10-06
Payer: MEDICARE

## 2021-10-06 VITALS
SYSTOLIC BLOOD PRESSURE: 118 MMHG | DIASTOLIC BLOOD PRESSURE: 58 MMHG | TEMPERATURE: 98.1 F | RESPIRATION RATE: 18 BRPM | OXYGEN SATURATION: 100 % | HEART RATE: 77 BPM

## 2021-10-06 DIAGNOSIS — M79.18 MYOFASCIAL PAIN: ICD-10-CM

## 2021-10-06 PROCEDURE — 20553 NJX 1/MLT TRIGGER POINTS 3/>: CPT

## 2021-10-06 PROCEDURE — 6360000002 HC RX W HCPCS

## 2021-10-06 PROCEDURE — 64615 CHEMODENERV MUSC MIGRAINE: CPT

## 2021-10-06 PROCEDURE — 2500000003 HC RX 250 WO HCPCS

## 2021-10-06 PROCEDURE — 20553 NJX 1/MLT TRIGGER POINTS 3/>: CPT | Performed by: NURSE PRACTITIONER

## 2021-10-06 PROCEDURE — 64615 CHEMODENERV MUSC MIGRAINE: CPT | Performed by: NURSE PRACTITIONER

## 2021-10-06 RX ORDER — BUPIVACAINE HYDROCHLORIDE 5 MG/ML
15 INJECTION, SOLUTION EPIDURAL; INTRACAUDAL ONCE
Status: DISCONTINUED | OUTPATIENT
Start: 2021-10-06 | End: 2021-10-08 | Stop reason: HOSPADM

## 2021-10-06 RX ORDER — LIDOCAINE HYDROCHLORIDE 10 MG/ML
15 INJECTION, SOLUTION EPIDURAL; INFILTRATION; INTRACAUDAL; PERINEURAL ONCE
Status: DISCONTINUED | OUTPATIENT
Start: 2021-10-06 | End: 2021-10-08 | Stop reason: HOSPADM

## 2021-10-06 RX ORDER — SODIUM CHLORIDE 9 MG/ML
4.5 INJECTION INTRAVENOUS ONCE
Status: DISCONTINUED | OUTPATIENT
Start: 2021-10-06 | End: 2021-10-08 | Stop reason: HOSPADM

## 2021-10-06 NOTE — PROCEDURES
[address]    Patient Name: Ronda Kessler    : 1975                    Age: 51 y.o. Sex: female    Date: 2021    Pre-op Diagnosis: Chronic Migraines    Post-op Diagnosis: Chronic Migraines    Procedure: Botox injections x 155 units (45 units wasted)    Performing Procedure: [facility]    Patient Vitals for the past 24 hrs:   BP Temp Temp src Pulse Resp SpO2   10/06/21 0855 (!) 118/58 98.1 °F (36.7 °C) Temporal 77 18 100 %       Previous Injections:  Patient had 15/3 headaches/migraines over the past month. Indications:    Ronda Kessler has been treated for problems with chronic migraine headaches. The patient was taken through a conservative course of treatment without complete resolution of symptoms. Botox injection therapy was offered and after the risks and benefits of the procedure were explained to the patient, we had agreed to proceed. The patient was taken to the procedure room and placed in the seated position. The following muscles were identified using palpation and standard landmarks. These muscles were marked and prepped with alcohol. A total of 155 units were injected after careful aspiration and the following distribution bilaterally:  10 units in 2 sites, procerus 5 units in one site, frontalis 20 units in 4 sites, temporalis 40 unit in 8 sites, occipitals 30 units in 6 sites, cervical paraspinals 20 units in 4 sites, and trapezius 30 units in 6 sites.     Pre-op Diagnosis: Myofascial Pain/ Muscle Spasms/ Cervicalgia    Post-op Diagnosis: Myofascial Pain/ Muscle Spasms/ Cervicalgia    Procedure: Cervical Trigger Point Injections    Performing Procedure: [facility] - [facility]    Patient Vitals for the past 24 hrs:   BP Temp Temp src Pulse Resp SpO2   10/06/21 0855 (!) 118/58 98.1 °F (36.7 °C) Temporal 77 18 100 %       Description of Procedure:    After a brief physical assessment and failure to improve with conservative measures the patient presented for Cervical Trigger Point Injections The indications, limitations and possible complications were discussed with the patient and the patient elected to proceed with the procedure. After voluntary, informed and signed consent obtained the patient was placed in a seated position. Appropriate time out was obtained per policy. The area of maximal tenderness was palpated over the   bilaterally Cervical muscles - Splenius  Trapezius  Rhomboid The skin overlying these areas was marked with a skin marker. The skin overlying the proposed injection sites were then sprayed with Gebauer's Solution while protecting patient eyes. The areas were prepped using aseptic technique with CHG prep. Each trigger point of the Cervical muscles - Splenius  Trapezius  Rhomboid was injected with approximately 1-2 ml of a solution of 5 ml of 1% Lidocaine Plain and 5 ml of 0.5% Marcaine Plain after negative aspiration    Pre-op Diagnosis: Myofascial Pain/ Muscle Spasms    Post-op Diagnosis: Myofascial Pain/ Muscle Spasms    Procedure: Thoracic Trigger Point Injections    Performing Procedure: [facility]    Patient Vitals for the past 24 hrs:   BP Temp Temp src Pulse Resp SpO2   10/06/21 0855 (!) 118/58 98.1 °F (36.7 °C) Temporal 77 18 100 %       Description of Procedure:  After a brief physical assessment and failure to improve with conservative measures the patient presented for Thoracic Trigger Point Injections The indications, limitations and possible complications were discussed with the patient and the patient elected to proceed with the procedure. After voluntary, informed and signed consent obtained the patient was placed in a seated position. Appropriate time out was obtained per policy. The areas were then prepped in a sterile fashion with Chloro-Prep.  The area of maximal tenderness was palpated over the bilaterally Thoracic Muscles - Erector Spinae, Upper/Mid Latissimus, Rhomboid Minor, Rhomboid Major. The skin overlying these areas was marked with a skin marker. The areas were prepped using aseptic technique with CHG prep. The skin overlying the proposed injection sites were then sprayed with Gebauer's Solution while protecting patient eyes. Each trigger point of the Thoracic Muscles - Erector Spinae, Upper/Mid Latissimus, Rhomboid Minor, Rhomboid Major was injected after negative aspiration was injected with approximately 1-2 ml of a solution of 5 ml of 1% Lidocaine Plain and 5 ml of 0.5% Marcaine Plain after negative aspiration    Discharge: The patient tolerated the procedure well. There were no complications during the procedure and the patient was discharged home with discharge instructions. The patient has been instructed to contact the office should there be any complications or questions to arise between today and their next appointment. Plan:     Will return to the office in 6 weeks for follow up    AMNA Winter CNP, 10/6/2021 at 9:00 AM

## 2021-10-06 NOTE — PROGRESS NOTES
Procedure:  Level of Consciousness: [x]Alert [x]Oriented []Disoriented []Lethargic  Anxiety Level: [x]Calm []Anxious []Depressed []Other  Skin: []Warm [x]Dry []Cool []Moist []Intact []Other  Cardiovascular: [x]Palpitations: [x]Never []Occasionally []Frequently  Chest Pain: [x]No []Yes  Respiratory:  [x]Unlabored []Labored []Cough ([] Productive []Unproductive)  HCG Required: [x]No []Yes   Results: []Negative []Positive  Knowledge Level:        [x]Patient/Other verbalized understanding of pre-procedure instructions. [x]Assessment of post-op care needs (transportation, responsible caregiver)        [x]Able to discuss health care problems and how to deal with it. Factors that Affect Teaching:        Language Barrier: [x]No []Yes - why:        Hearing Loss:        [x]No []Yes            Corrective Device:  []Yes []No        Vision Loss:           []No [x]Yes            Corrective Device:  [x]Yes []No        Memory Loss:       [x]No []Yes            []Short Term []Long Term  Motivational Level:  [x]Asks Questions                  []Extremely Anxious       [x]Seems Interested               []Seems Uninterested                  [x]Denies need for Education  Risk for Injury:  [x]Patient oriented to person, place and time  []History of frequent falls/loss of balance  Nutritional:  []Change in appetite   []Weight Gain   []Weight Loss  Functional:  []Requires assistance with ADL's      Botox Assessment  [] Patient  has had a reduction of at least 100 hours (5 Days) in Migraines since receiving Botox  []  []  []  Factors that Affect Teaching:  Assessment of post-op care needs (transportation, responsible caregiver)        []Able to discuss health care problems and how to deal with it. Factors that Affect Teaching:Patient states that he/she has ___15___ headaches out of 30 days each month.   Patient states that he/she has __3____ migraines out of 30 days each month.monthly

## 2021-10-20 ENCOUNTER — HOSPITAL ENCOUNTER (OUTPATIENT)
Dept: PAIN MANAGEMENT | Age: 46
Discharge: HOME | End: 2021-10-20
Payer: MEDICARE

## 2021-10-20 VITALS
OXYGEN SATURATION: 100 % | SYSTOLIC BLOOD PRESSURE: 121 MMHG | RESPIRATION RATE: 18 BRPM | HEART RATE: 81 BPM | TEMPERATURE: 98 F | DIASTOLIC BLOOD PRESSURE: 62 MMHG

## 2021-10-20 DIAGNOSIS — M79.18 MYOFASCIAL PAIN: ICD-10-CM

## 2021-10-20 PROCEDURE — 2500000003 HC RX 250 WO HCPCS

## 2021-10-20 PROCEDURE — 6360000002 HC RX W HCPCS

## 2021-10-20 PROCEDURE — 76942 ECHO GUIDE FOR BIOPSY: CPT | Performed by: NURSE PRACTITIONER

## 2021-10-20 PROCEDURE — 20611 DRAIN/INJ JOINT/BURSA W/US: CPT

## 2021-10-20 PROCEDURE — 20553 NJX 1/MLT TRIGGER POINTS 3/>: CPT | Performed by: NURSE PRACTITIONER

## 2021-10-20 RX ORDER — TRIAMCINOLONE ACETONIDE 40 MG/ML
80 INJECTION, SUSPENSION INTRA-ARTICULAR; INTRAMUSCULAR ONCE
Status: DISCONTINUED | OUTPATIENT
Start: 2021-10-20 | End: 2021-10-22 | Stop reason: HOSPADM

## 2021-10-20 RX ORDER — BUPIVACAINE HYDROCHLORIDE 5 MG/ML
4 INJECTION, SOLUTION EPIDURAL; INTRACAUDAL ONCE
Status: DISCONTINUED | OUTPATIENT
Start: 2021-10-20 | End: 2021-10-22 | Stop reason: HOSPADM

## 2021-10-20 RX ORDER — LIDOCAINE HYDROCHLORIDE 10 MG/ML
4 INJECTION, SOLUTION EPIDURAL; INFILTRATION; INTRACAUDAL; PERINEURAL ONCE
Status: DISCONTINUED | OUTPATIENT
Start: 2021-10-20 | End: 2021-10-22 | Stop reason: HOSPADM

## 2021-10-20 NOTE — PROCEDURES
[facility] Physical & Pain Medicine    Patient Name: Eunice Schneider    : 1975    Age: 51 y.o. Sex: female    Date: 2021    Pre-op Diagnosis: bilateral  Sacroiliac Joint(s) Dysfunction/ Sacroiliitis    Post-op Diagnosis: bilateral  Sacroiliac Joint(s) Dysfunction/ Sacroliliits    Procedure: Ultrasound Guided Injection of  bilateral Sacroiliac Joint(s)     Performing Procedure:  Ari Needle, ACAGNP - BC, [facility]    Patient Vitals for the past 24 hrs:   BP Temp Temp src Pulse Resp SpO2   10/20/21 1024 121/62 98 °F (36.7 °C) Temporal 81 18 100 %       bilateral  Sacroiliac Joint(s)     Description of Procedure:    After voluntary, informed and signed consent obtained the patient was placed in a prone position. Appropriate time out was obtained per policy. The Sacroiliac Joint(s) was palpated for area of maximal tenderness. The area was prepped in an aseptic fashion with CHG swab. The ultrasound transducer was used to confirm the appropriate location. The skin was sprayed with Gebauer's Solution. Under aseptic technique and direct ultrasound visualization a 22 gauge 3 inch spinal needle was introduced into the Sacroiliac Joint(s). After a negative aspiration, a solution of 2 ml of 0.5% Marcaine Plain and 2 ml of 1% Lidocaine Plain and 1 ml of Kenalog (40 mg/ml) was injected into the Sacroiliac Joint(s). The needle was withdrawn and a sterile dressing applied. If this was a bilateral procedure, the same steps were followed on the opposite side. Discharge: The patient tolerated the procedure well. There were no complications during the procedure and the patient was discharged home with discharge instructions. The patient has been instructed to contact the office should there be any complications or questions to arise between today and their next appointment. Plan:    The patient will follow up in the office in approximately 6 weeks.      Marylen Reap, APRN - [place], 10/20/2021 at 10:45 AM

## 2021-11-02 DIAGNOSIS — M54.50 CHRONIC BILATERAL LOW BACK PAIN WITHOUT SCIATICA: Chronic | ICD-10-CM

## 2021-11-02 DIAGNOSIS — G43.019 INTRACTABLE MIGRAINE WITHOUT AURA AND WITHOUT STATUS MIGRAINOSUS: Chronic | ICD-10-CM

## 2021-11-02 DIAGNOSIS — G89.29 CHRONIC BILATERAL LOW BACK PAIN WITHOUT SCIATICA: Chronic | ICD-10-CM

## 2021-11-02 RX ORDER — BUTALBITAL, ACETAMINOPHEN AND CAFFEINE 50; 325; 40 MG/1; MG/1; MG/1
1 TABLET ORAL DAILY PRN
Qty: 30 TABLET | Refills: 2 | Status: SHIPPED | OUTPATIENT
Start: 2021-11-02 | End: 2021-12-28 | Stop reason: SDUPTHER

## 2021-11-02 RX ORDER — HYDROCODONE BITARTRATE AND ACETAMINOPHEN 5; 325 MG/1; MG/1
1 TABLET ORAL EVERY 8 HOURS PRN
Qty: 60 TABLET | Refills: 0 | Status: SHIPPED | OUTPATIENT
Start: 2021-11-02 | End: 2021-11-30 | Stop reason: SDUPTHER

## 2021-11-30 DIAGNOSIS — G89.29 CHRONIC BILATERAL LOW BACK PAIN WITHOUT SCIATICA: Chronic | ICD-10-CM

## 2021-11-30 DIAGNOSIS — M54.50 CHRONIC BILATERAL LOW BACK PAIN WITHOUT SCIATICA: Chronic | ICD-10-CM

## 2021-12-01 RX ORDER — HYDROCODONE BITARTRATE AND ACETAMINOPHEN 5; 325 MG/1; MG/1
1 TABLET ORAL EVERY 8 HOURS PRN
Qty: 60 TABLET | Refills: 0 | Status: SHIPPED | OUTPATIENT
Start: 2021-12-03 | End: 2021-12-28 | Stop reason: SDUPTHER

## 2021-12-28 ENCOUNTER — HOSPITAL ENCOUNTER (OUTPATIENT)
Dept: PAIN MANAGEMENT | Age: 46
Discharge: HOME | End: 2021-12-28
Payer: MEDICARE

## 2021-12-28 VITALS
TEMPERATURE: 97 F | HEART RATE: 91 BPM | DIASTOLIC BLOOD PRESSURE: 70 MMHG | SYSTOLIC BLOOD PRESSURE: 118 MMHG | OXYGEN SATURATION: 100 %

## 2021-12-28 DIAGNOSIS — M79.18 MYOFASCIAL PAIN: ICD-10-CM

## 2021-12-28 DIAGNOSIS — G43.019 INTRACTABLE MIGRAINE WITHOUT AURA AND WITHOUT STATUS MIGRAINOSUS: Chronic | ICD-10-CM

## 2021-12-28 DIAGNOSIS — M54.50 CHRONIC BILATERAL LOW BACK PAIN WITHOUT SCIATICA: Chronic | ICD-10-CM

## 2021-12-28 DIAGNOSIS — G89.29 CHRONIC BILATERAL LOW BACK PAIN WITHOUT SCIATICA: Chronic | ICD-10-CM

## 2021-12-28 DIAGNOSIS — Z86.19 HISTORY OF SHINGLES: ICD-10-CM

## 2021-12-28 PROCEDURE — 99214 OFFICE O/P EST MOD 30 MIN: CPT | Performed by: NURSE PRACTITIONER

## 2021-12-28 PROCEDURE — 99213 OFFICE O/P EST LOW 20 MIN: CPT

## 2021-12-28 RX ORDER — HYDROCODONE BITARTRATE AND ACETAMINOPHEN 5; 325 MG/1; MG/1
1 TABLET ORAL EVERY 8 HOURS PRN
Qty: 60 TABLET | Refills: 0 | Status: SHIPPED | OUTPATIENT
Start: 2021-12-31 | End: 2022-01-24 | Stop reason: SDUPTHER

## 2021-12-28 RX ORDER — IBUPROFEN 800 MG/1
800 TABLET ORAL 3 TIMES DAILY PRN
Qty: 90 TABLET | Refills: 2 | Status: SHIPPED | OUTPATIENT
Start: 2021-12-28

## 2021-12-28 RX ORDER — BUTALBITAL, ACETAMINOPHEN AND CAFFEINE 50; 325; 40 MG/1; MG/1; MG/1
1 TABLET ORAL DAILY PRN
Qty: 30 TABLET | Refills: 2 | Status: SHIPPED | OUTPATIENT
Start: 2021-12-28 | End: 2022-09-07 | Stop reason: SDUPTHER

## 2021-12-28 RX ORDER — BUTALBITAL, ACETAMINOPHEN AND CAFFEINE 50; 325; 40 MG/1; MG/1; MG/1
1 TABLET ORAL DAILY PRN
Qty: 30 TABLET | Refills: 2 | OUTPATIENT
Start: 2021-12-28

## 2021-12-28 RX ORDER — SUMATRIPTAN 50 MG/1
50 TABLET, FILM COATED ORAL
Qty: 9 TABLET | Refills: 3 | OUTPATIENT
Start: 2021-12-28 | End: 2021-12-28

## 2021-12-28 RX ORDER — SUMATRIPTAN 50 MG/1
50 TABLET, FILM COATED ORAL
Qty: 9 TABLET | Refills: 3 | Status: SHIPPED | OUTPATIENT
Start: 2021-12-28 | End: 2022-04-28 | Stop reason: ALTCHOICE

## 2021-12-28 RX ORDER — LIDOCAINE 50 MG/G
3 PATCH TOPICAL DAILY
Qty: 30 PATCH | Refills: 2 | Status: SHIPPED | OUTPATIENT
Start: 2021-12-28 | End: 2022-09-07 | Stop reason: SDUPTHER

## 2021-12-28 RX ORDER — IBUPROFEN 800 MG/1
TABLET ORAL
Qty: 90 TABLET | Refills: 2 | OUTPATIENT
Start: 2021-12-28

## 2021-12-28 ASSESSMENT — ENCOUNTER SYMPTOMS
BACK PAIN: 1
SCALP TENDERNESS: 1
VISUAL CHANGE: 1
NAUSEA: 1
BLURRED VISION: 1
PHOTOPHOBIA: 1
CONSTIPATION: 0

## 2021-12-28 ASSESSMENT — PAIN SCALES - GENERAL: PAINLEVEL_OUTOF10: 3

## 2021-12-28 NOTE — PROGRESS NOTES
[facility] Physical & Pain Medicine    Office Visit    Patient Name: Leighton Erickson    MR #: [id number]    Account [de-identified]    : 1975    Age: 51 y.o. Sex: female    Date: 2021    PCP: Renu Cisneros MD    Chief Complaint:   Chief Complaint   Patient presents with    Back Pain    Neck Pain       History of Present Illness: The patient is a 51 y.o. female who presents for procedure follow up. Patient had bilateral cervical and thoracic trigger point injections on 10/6/2021. Patient had at least 85% relief of pain from procedure(s) for at least 6 weeks and was able to increase activity after procedure. Patient received enough pain relief from injections that the patient would like to repeat the injection(s). Patient had Botox for migraines on 10/6/2021. Patient obtained at least a 50 % reduction in mean headache days per month with a decrease in severity, in intensity and in aggravating symptoms compared to pre-treatment. Patient had over 100 hours of migraine freedom per month with Botox. Patient had bilateral SI joint injections on 10/26/2021. Patient had at least 85% relief of pain from procedure(s) for at least 6 weeks and was able to increase activity after procedure. Patient received enough pain relief from injections that the patient would like to repeat the injection(s). Patient states that she is waking up at night with severe night sweats. She states that her doctor has ran lab work and everything has been coming back normal.     Patient states that she can feel her heart beat behind her right ear and that she can feel her pulse behind her ear. Patient had surgery for chiari malformation type I and she remembers being told that her vascularization was constricted and had to be opened up. She has not had a follow up with neurology in over a year. Patient had MRI of brain in 10/2019 which was unremarkable. Back Pain  This is a chronic problem.  The current episode started more than 1 year ago. The problem occurs intermittently. The problem has been waxing and waning since onset. The pain is present in the lumbar spine and sacro-iliac. The quality of the pain is described as cramping and aching. The symptoms are aggravated by bending, sitting and standing. Associated symptoms include headaches. Headache   This is a chronic problem. The current episode started more than 1 year ago. The problem occurs constantly. The problem has been waxing and waning. The pain is located in the bilateral, frontal and occipital region. Associated symptoms include back pain, blurred vision, nausea, neck pain, phonophobia, photophobia, scalp tenderness and a visual change. The symptoms are aggravated by bright light, activity, emotional stress and noise. Neck Pain   This is a chronic problem. The current episode started more than 1 year ago. The problem occurs constantly. The problem has been waxing and waning. The quality of the pain is described as aching and cramping. Associated symptoms include headaches, photophobia and a visual change. Screening Tools:    PEG Score: 4     Last PEG Score: 4     Annual ORT Score: 4     Annual PHQ Score: 5     Current Pain Assessment  Pain Assessment  Pain Assessment: 0-10  Pain Level: 3    Past Visit HPI:  3/1/2021  presents for follow up of procedure. Patient had Lumbar trigger point injections on 11/18/2020, Cervical and thoracic trigger point injections on 1/6/2021, and Botox for Migraines on 1/13/2021. Patient had at least 85% relief of pain from procedure(s) for at least 6 weeks and was able to increase activity after procedure. Patient received enough pain relief from injections that the patient would like to repeat the injection(s). Patient states that she does not have to use abortive migraine medications until the last two to four weeks prior to Botox injections and sometimes the two weeks after receiving injections.  Patient will take Fioricet first especially is she is not at home then she will take Imitrex. The Imitrex causes her to be very drowsy. Patient is behind on lumbar injections due to providers scheduled and her low back is bothering her a little more right now. 11/10/2020  presents for procedure follow-up. Patient had bilateral SI joint injections with lumbar trigger points on 7/1/2020, patient had cervical and thoracic trigger points on 7/15/2020, and patient had Botox injections on 9/2/2020. Patient had at least 85% relief of pain from procedure(s) for at least 6 weeks and was able to increase activity after procedure. Patient received enough pain relief from injections that the patient would like to repeat the injection(s).    6/22/2020  presents to the office for annual exam with primary complaints of chronic headaches and back pain. Patient was referred to this office last April 2018 with a past history of chiari malformation. Patient had a posterior fossa decompression and her pain had pretty much resolved for approximately 3 years. However during the year prior to coming back to this office patient started developing pain again. She started having chronic headaches, along with myofascial pain in the shoulders and thoracic spine. Through the years patient has been on numerous medications to treat headaches including the following but not limited to Topamax, amitriptyline, propranolol, Imitrex, gabapentin, and Lyrica, Fioricet does help with the intense headaches but he does not make headaches tolerable. She does follow with neurology and she has been started on Aimovig which helps. Patient had been getting bilateral occipital nerve blocks along with trigger point injections in her cervical, thoracic, and lumbar spine. Patient was changed to botox injections in October 2019. Patient has had excellent results from botox injections. Patient did have a flare up of low back pain.  She has had SI injections which have made low back pain improve. Past Medical History  Past Medical History:   Diagnosis Date    ADHD (attention deficit hyperactivity disorder)     Anemia     Anxiety     Cervicalgia     Chiari malformation type I ([facility] [address]     s/p decompression 2014    Chronic back pain     Depression     Headache(784.0)     Hiatal hernia     Hyperlipidemia     Indigestion     Obstructive sleep apnea     Mild with an AHI:  7.5 and  RDI:  14.7       Medications  Current Outpatient Medications   Medication Sig Dispense Refill    HYDROcodone-acetaminophen (NORCO) 5-325 MG per tablet Take 1 tablet by mouth every 8 hours as needed for Pain for up to 30 days. 60 tablet 0    butalbital-acetaminophen-caffeine (FIORICET, ESGIC) -40 MG per tablet Take 1 tablet by mouth daily as needed for Headaches (may repeat in 4 hours x 1 no more than 2 per day) 30 tablet 2    SUMAtriptan (IMITREX) 50 MG tablet Take 1 tablet by mouth once as needed for Migraine 9 tablet 3    ALPRAZolam (XANAX) 0.25 MG tablet       ibuprofen (ADVIL;MOTRIN) 800 MG tablet Take 1 tablet by mouth 3 times daily as needed for Pain 90 tablet 2    FLUoxetine (PROZAC) 20 MG capsule Take 40 mg by mouth daily       Ubrogepant (UBRELVY) 100 MG TABS Take 100 mg by mouth daily as needed (migraines) 10 tablet 2    VYVANSE 30 MG capsule Take 1 capsule by mouth daily.  lidocaine (LIDODERM) 5 % Place 3 patches onto the skin daily 12 hours on, 12 hours off. 30 patch 2    zolpidem (AMBIEN) 10 MG tablet Take 1 tablet by mouth nightly. .  2    lamoTRIgine (LAMICTAL) 100 MG tablet Take 200 mg by mouth nightly   2     No current facility-administered medications for this encounter. Allergies  Ultram [tramadol]    Current Medications  Current Outpatient Medications   Medication Sig Dispense Refill    HYDROcodone-acetaminophen (NORCO) 5-325 MG per tablet Take 1 tablet by mouth every 8 hours as needed for Pain for up to 30 days.  60 tablet 0    butalbital-acetaminophen-caffeine (FIORICET, ESGIC) -40 MG per tablet Take 1 tablet by mouth daily as needed for Headaches (may repeat in 4 hours x 1 no more than 2 per day) 30 tablet 2    SUMAtriptan (IMITREX) 50 MG tablet Take 1 tablet by mouth once as needed for Migraine 9 tablet 3    ALPRAZolam (XANAX) 0.25 MG tablet       ibuprofen (ADVIL;MOTRIN) 800 MG tablet Take 1 tablet by mouth 3 times daily as needed for Pain 90 tablet 2    FLUoxetine (PROZAC) 20 MG capsule Take 40 mg by mouth daily       Ubrogepant (UBRELVY) 100 MG TABS Take 100 mg by mouth daily as needed (migraines) 10 tablet 2    VYVANSE 30 MG capsule Take 1 capsule by mouth daily.  lidocaine (LIDODERM) 5 % Place 3 patches onto the skin daily 12 hours on, 12 hours off. 30 patch 2    zolpidem (AMBIEN) 10 MG tablet Take 1 tablet by mouth nightly. .  2    lamoTRIgine (LAMICTAL) 100 MG tablet Take 200 mg by mouth nightly   2     No current facility-administered medications for this encounter. Social History    Social History     Socioeconomic History    Marital status:      Spouse name: Not on file    Number of children: Not on file    Years of education: Not on file    Highest education level: Not on file   Occupational History    Not on file   Tobacco Use    Smoking status: Never Smoker    Smokeless tobacco: Never Used   Vaping Use    Vaping Use: Never used   Substance and Sexual Activity    Alcohol use: No    Drug use: No    Sexual activity: Yes     Partners: Male   Other Topics Concern    Not on file   Social History Narrative    Not on file     Social Determinants of Health     Financial Resource Strain:     Difficulty of Paying Living Expenses: Not on file   Food Insecurity:     Worried About Running Out of Food in the Last Year: Not on file    Vlad of Food in the Last Year: Not on file   Transportation Needs:     Lack of Transportation (Medical):  Not on file    Lack of Transportation (Non-Medical): Not on file   Physical Activity:     Days of Exercise per Week: Not on file    Minutes of Exercise per Session: Not on file   Stress:     Feeling of Stress : Not on file   Social Connections:     Frequency of Communication with Friends and Family: Not on file    Frequency of Social Gatherings with Friends and Family: Not on file    Attends Orthodoxy Services: Not on file    Active Member of [address] [place] [address] [employer] or Organizations: Not on file    Attends Club or Organization Meetings: Not on file    Marital Status: Not on file   Intimate Partner Violence:     Fear of Current or Ex-Partner: Not on file    Emotionally Abused: Not on file    Physically Abused: Not on file    Sexually Abused: Not on file   Housing Stability:     Unable to Pay for Housing in the Last Year: Not on file    Number of [place] in the Last Year: Not on file    Unstable Housing in the Last Year: Not on file         Family History  family history includes Cancer in her maternal aunt and another family member; High Blood Pressure in her father and sister. Review of Systems:  Review of Systems   Constitutional: Positive for activity change. Eyes: Positive for blurred vision, photophobia and visual disturbance. Gastrointestinal: Positive for nausea. Negative for constipation. Musculoskeletal: Positive for arthralgias, back pain, myalgias, neck pain and neck stiffness. Neurological: Positive for headaches. Psychiatric/Behavioral: Positive for sleep disturbance. Negative for agitation, self-injury and suicidal ideas. The patient is not nervous/anxious. 14 point ROS negative besides that noted in HPI    Physical exam:     Patient Vitals for the past 24 hrs:   BP Temp Pulse SpO2   12/28/21 1445 118/70 97 °F (36.1 °C) 91 100 %       There is no height or weight on file to calculate BMI. Physical Exam  Vitals and nursing note reviewed. Constitutional:       General: She is not in acute distress.      Appearance: leg syndrome    Chiari malformation type I (HCC)    Intractable migraine without aura and without status migrainosus    Myofascial pain    Bilateral occipital neuralgia    SI (sacroiliac) joint dysfunction    Chronic bilateral low back pain without sciatica    Pain management contract agreement  Resolved Problems:    * No resolved hospital problems. *      PLAN:  Medications:   Intractable migraine without aura and without status migrainosus  Continue medication with refill sent at appointment see refill encounter. SUMAtriptan (IMITREX) 50 MG tablet; Take 1 tablet by mouth once as needed for Migraine  Dispense: 9 tablet; Refill: 3    Continue medication with refill sent at appointment see refill encounter. butalbital-acetaminophen-caffeine (FIORICET, ESGIC) -40 MG per tablet; Take 1 tablet by mouth daily as needed for Headaches (may repeat in 4 hours x 1 no more than 2 per day)  Dispense: 30 tablet; Refill: 2    Chronic bilateral low back pain without sciatica  Continue medication with refill sent at appointment see refill encounter. HYDROcodone-acetaminophen (NORCO) 5-325 MG per tablet; Take 1 tablet by mouth every 8 hours as needed for Pain for up to 30 days. Take lowest dose possible to manage pain  Dispense: 60 tablet; Refill: 0    Continue medication with refill sent at appointment see refill encounter. Ibuprofen (Advil) 800 mg TID prn # 90 with 2 RF's    Continue medication with refill sent at appointment see refill encounter. Lidocaine (lidoderm) 5% 1-3 patches transdermal daily # 30 2 RF's    Treatments:     Continue Botox for migraine.   Patient obtained 50% reduction in mean headache days per month with a decrease in severity, intensity, and aggravating symptoms as compared with pretreatment and patient had over 100 hours of migraine freedom per month after Botox    Continue and schedule cervical and thoracic trigger point injections for myofascial pain as patient has had good response in the past.    Continue and schedule lumbar trigger point injections for myofacial pain as patient has had a good response to these injections in the past.     Continue and Schedule bilateral SI joint injections with ultrasound for SI joint dysfunction and chronic low back pain as patient has had good response with these before. Current treatment plan is effective in keeping patient's pain tolerable. Reviewed medications prescribed by this office and do not see a cause for night sweats. Recommend patient follow up neurology regarding her concerns regarding hx of chiari malformation. Will forward this note to neurology. [x] Follow up    [] 4 weeks   [x] 6-8 weeks   [] 10-12 weeks   [] 3 months  [x] Post procedure to evaluate effectiveness of treatment  [] To evaluate medications changes made at office visit. [] To review diagnostics ordered at last visit. [] To evaluate response to therapy    [x] For management of controlled substance  [x] Random UDS as indicated by ORT score or if indicated by abberent behaviors    Discussion: Discussed exam findings and plan of care with patient. Patient agreed with POC and questions were asked and answered. Activity: discussed exercise as beneficial to pain reduction, encouraged stretching exercise with a focus on torso strengthening. Goals:  Pain Management Goals of Therapy:   [] Resolution in pain  [x] Decrease in pain level  [x] Improvement in ADL's  [x] Increase in activities with less pain  [] Decrease in medication      Controlled substance monitoring:    [x] Discussed medication side effects, risk of tolerance and/or dependence, and/or alternative treatment  [] Discussed the detrimental effects of long term narcotic use in younger patients especially women of childbearing years.   [x] No signs and symptoms of potential drug abuse or diversion were identified  [] Signs of potential drug abuse or diversion were identified   [] ORT Score   [] UDS non-compliant   [] See Note  [] Random urine drug screen sent today  [x] Random urine drug screen not completed today   [] Deferred New Patient  [x] Compliant 9/9/2021  [] Not Compliant see note  [] Medication agreement with provider signed today 6/22/2020  [x] Medication agreement with provider on file under media   [] Medication regimen effective with c/o of side effects noted and continued   [] New patient continuing current medication while developing POC   [x] On going assessment and evaluation of medication regimen  [] Medication regimen not effective see plan for changes  [x] Yuliana Miller reviewed & on file under media     CC: Bernabe Snellen, MD Delphine Messing, APRN - CNP, 12/28/2021 at 3:11 PM    EMR dragon/transcription disclaimer: Much of this encounter note is electronic transcription/translation of spoken language to printed tach. Electronic translation of spoken language may be erroneous, or at times, nonsensical words or phrases may be inadvertently transcribed.  Although, I have reviewed the note for such errors, some may still exist.

## 2021-12-28 NOTE — PROGRESS NOTES
Clinic Documentation      Education Provided:  [x] Review of Cong Diss  [] Agreement Review  [x] PEG Score Calculated [] PHQ Score Calculated [] ORT Score Calculated    [] Compliance Issues Discussed [] Cognitive Behavior Needs [x] Exercise [] Review of Test [] Financial Issues  [x] Tobacco/Alcohol Use Reviewed [x] Teaching [] New Patient [] Picture Obtained    Physician Plan:  [] Outgoing Referral  [] Pharmacy Consult  [] Test Ordered [x] Prescription Ordered/Changed   [] Obtained Test Results / Consult Notes        Complete if patient is withholding blood thinner for procedure     Blood Thinner Patient is currently taking:      [] Plavix (Hold for 7 days)  [] Aspirin (Hold for 5 days)     [] Pletal (Hold for 2 days)  [] Pradaxa (Hold for 3 days)    [] Effient (Hold for 7 days)  [] Xarelto (Hold for 2 days)    [] Eliquis (Hold for 2 days)  [] Brilinta (Hold for 7 days)    [] Coumadin (Hold for 5 days) - (INR needs to be drawn the day prior to procedure- INR < 2.0)    [] Aggrenox (Hold for 7 days)        [] Patient will stop medication on their own.    [] Blood Thinner Form Faxed for approval to hold.    Provider form faxed to:   Assessment Completed by:  Electronically signed by Donn Delacruz MA on 12/28/2021 at 2:53 PM

## 2021-12-28 NOTE — TELEPHONE ENCOUNTER
Requested Prescriptions     Signed Prescriptions Disp Refills    HYDROcodone-acetaminophen (NORCO) 5-325 MG per tablet 60 tablet 0     Sig: Take 1 tablet by mouth every 8 hours as needed for Pain for up to 30 days. Authorizing Provider: Henrry Benavidez    butalbital-acetaminophen-caffeine (FIORICET, ESGIC) -40 MG per tablet 30 tablet 2     Sig: Take 1 tablet by mouth daily as needed for Headaches (may repeat in 4 hours x 1 no more than 2 per day)     Authorizing Provider: Federico CEDILLO    ibuprofen (ADVIL;MOTRIN) 800 MG tablet 90 tablet 2     Sig: Take 1 tablet by mouth 3 times daily as needed for Pain     Authorizing Provider: Federico CEDILLO    lidocaine (LIDODERM) 5 % 30 patch 2     Sig: Place 3 patches onto the skin daily 12 hours on, 12 hours off.      Authorizing Provider: Henrry Benavidez    SUMAtriptan (IMITREX) 50 MG tablet 9 tablet 3     Sig: Take 1 tablet by mouth once as needed for Migraine     Authorizing Provider: Henrry Benavidez     Refill at office appointment

## 2022-01-19 ENCOUNTER — HOSPITAL ENCOUNTER (OUTPATIENT)
Dept: PAIN MANAGEMENT | Age: 47
Discharge: HOME | End: 2022-01-19
Payer: MEDICARE

## 2022-01-19 VITALS
TEMPERATURE: 97.8 F | HEART RATE: 94 BPM | OXYGEN SATURATION: 100 % | SYSTOLIC BLOOD PRESSURE: 112 MMHG | DIASTOLIC BLOOD PRESSURE: 64 MMHG | RESPIRATION RATE: 18 BRPM

## 2022-01-19 PROCEDURE — 2500000003 HC RX 250 WO HCPCS

## 2022-01-19 PROCEDURE — 20553 NJX 1/MLT TRIGGER POINTS 3/>: CPT

## 2022-01-19 PROCEDURE — 20553 NJX 1/MLT TRIGGER POINTS 3/>: CPT | Performed by: NURSE PRACTITIONER

## 2022-01-19 RX ORDER — BUPIVACAINE HYDROCHLORIDE 5 MG/ML
10 INJECTION, SOLUTION EPIDURAL; INTRACAUDAL ONCE
Status: DISCONTINUED | OUTPATIENT
Start: 2022-01-19 | End: 2022-01-21 | Stop reason: HOSPADM

## 2022-01-19 RX ORDER — LIDOCAINE HYDROCHLORIDE 10 MG/ML
10 INJECTION, SOLUTION EPIDURAL; INFILTRATION; INTRACAUDAL; PERINEURAL ONCE
Status: DISCONTINUED | OUTPATIENT
Start: 2022-01-19 | End: 2022-01-21 | Stop reason: HOSPADM

## 2022-01-19 NOTE — PROCEDURES
[facility] Physical & Pain Medicine    Patient Name: Isabel Gomez    : 1975                    Age: 51 y.o. Sex: female    Date: 2022    Pre-op Diagnosis: Myofascial Pain/ Muscle Spasms    Post-op Diagnosis: Myofascial Pain/ Muscle Spasms    Procedure: Lumbar Trigger Point Injections    Performing Procedure: Tera Mckinney, ACAGNP - BC; [facility]    Patient Vitals for the past 24 hrs:   BP Temp Temp src Pulse Resp SpO2   22 1402 112/64 97.8 °F (36.6 °C) Temporal 94 18 100 %       Description of Procedure:    After a brief physical assessment and failure to improve with conservative measures the patient presented for Lumbar Trigger Point Injections The indications, limitations and possible complications were discussed with the patient and the patient elected to proceed with the procedure. After voluntary, informed and signed consent obtained the patient was placed in a seated position. Appropriate time out was obtained per policy. The area of maximal tenderness was palpated over the bilaterally Lumbar Muscles - Lower Latissimus,  Erector Spinae, Lumbar Paraspinous. The skin overlying these areas was marked with a skin marker. The areas were prepped using aseptic technique with CHG prep. The skin overlying the proposed injection sites were then sprayed with Gebauer's Solution while protecting patient eyes. Each trigger point of the Lumbar Muscles - Lower Latissimus,  Erector Spinae, Lumbar Paraspinous was injected after negative aspiration was injected with approximately 1-2 ml of a solution of 5 ml of 1% Lidocaine Plain and 5 ml of 0.5% Marcaine Plain after negative aspiration    Discharge: The patient tolerated the procedure well. There were no complications during the procedure and the patient was discharged home with discharge instructions.     The patient has been instructed to contact the office should there be any complications or questions to arise between today and their next appointment. Plan:     Will return to the office in 6 weeks for follow up    AMNA Rojas - CNP, 1/19/2022 at 2:13 PM

## 2022-01-24 DIAGNOSIS — M54.50 CHRONIC BILATERAL LOW BACK PAIN WITHOUT SCIATICA: Chronic | ICD-10-CM

## 2022-01-24 DIAGNOSIS — G89.29 CHRONIC BILATERAL LOW BACK PAIN WITHOUT SCIATICA: Chronic | ICD-10-CM

## 2022-01-26 ENCOUNTER — TELEPHONE (OUTPATIENT)
Dept: PAIN MANAGEMENT | Age: 47
End: 2022-01-26

## 2022-01-26 DIAGNOSIS — M79.18 MYOFASCIAL PAIN: ICD-10-CM

## 2022-01-26 RX ORDER — HYDROCODONE BITARTRATE AND ACETAMINOPHEN 5; 325 MG/1; MG/1
1 TABLET ORAL EVERY 8 HOURS PRN
Qty: 60 TABLET | Refills: 0 | Status: SHIPPED | OUTPATIENT
Start: 2022-02-01 | End: 2022-03-01 | Stop reason: SDUPTHER

## 2022-02-02 ENCOUNTER — HOSPITAL ENCOUNTER (OUTPATIENT)
Dept: PAIN MANAGEMENT | Age: 47
Discharge: HOME | End: 2022-02-02
Payer: MEDICARE

## 2022-02-02 VITALS
HEART RATE: 89 BPM | TEMPERATURE: 97.6 F | OXYGEN SATURATION: 100 % | SYSTOLIC BLOOD PRESSURE: 118 MMHG | RESPIRATION RATE: 18 BRPM | DIASTOLIC BLOOD PRESSURE: 65 MMHG

## 2022-02-02 PROCEDURE — 20553 NJX 1/MLT TRIGGER POINTS 3/>: CPT

## 2022-02-02 PROCEDURE — 20553 NJX 1/MLT TRIGGER POINTS 3/>: CPT | Performed by: NURSE PRACTITIONER

## 2022-02-02 PROCEDURE — 2500000003 HC RX 250 WO HCPCS

## 2022-02-02 RX ORDER — LIDOCAINE HYDROCHLORIDE 10 MG/ML
10 INJECTION, SOLUTION EPIDURAL; INFILTRATION; INTRACAUDAL; PERINEURAL ONCE
Status: DISCONTINUED | OUTPATIENT
Start: 2022-02-02 | End: 2022-02-04 | Stop reason: HOSPADM

## 2022-02-02 RX ORDER — BUPIVACAINE HYDROCHLORIDE 5 MG/ML
10 INJECTION, SOLUTION EPIDURAL; INTRACAUDAL ONCE
Status: DISCONTINUED | OUTPATIENT
Start: 2022-02-02 | End: 2022-02-04 | Stop reason: HOSPADM

## 2022-02-02 NOTE — PROGRESS NOTES
Procedure:  Level of Consciousness: [x]Alert [x]Oriented []Disoriented []Lethargic  Anxiety Level: [x]Calm []Anxious []Depressed []Other  Skin: [x]Warm [x]Dry []Cool []Moist []Intact []Other  Cardiovascular: [x]Palpitations: []Never []Occasionally []Frequently  Chest Pain: [x]No []Yes  Respiratory:  [x]Unlabored []Labored []Cough ([] Productive []Unproductive)  HCG Required: [x]No []Yes   Results: []Negative []Positive  Knowledge Level:        [x]Patient/Other verbalized understanding of pre-procedure instructions. [x]Assessment of post-op care needs (transportation, responsible caregiver)        [x]Able to discuss health care problems and how to deal with it.   Factors that Affect Teaching:        Language Barrier: [x]No []Yes - why:        Hearing Loss:        [x]No []Yes            Corrective Device:  []Yes []No        Vision Loss:           []No [x]Yes            Corrective Device:  [x]Yes []No        Memory Loss:       [x]No []Yes            []Short Term []Long Term  Motivational Level:  [x]Asks Questions                  []Extremely Anxious       [x]Seems Interested               []Seems Uninterested                  [x]Denies need for Education  Risk for Injury:  [x]Patient oriented to person, place and time  []History of frequent falls/loss of balance  Nutritional:  []Change in appetite   []Weight Gain   []Weight Loss  Functional:  []Requires assistance with ADL's

## 2022-02-02 NOTE — PROCEDURES
[facility] Physical & Pain Medicine    Patient Name: Wilbert Torres    : 1975                    Age: 51 y.o. Sex: female    Date: 2022    Pre-op Diagnosis: Myofascial Pain/ Muscle Spasms/ Cervicalgia    Post-op Diagnosis: Myofascial Pain/ Muscle Spasms/ Cervicalgia    Procedure: Cervical Trigger Point Injections    Performing Procedure: SANDRO Cordoba - BC, VA-BC    Patient Vitals for the past 24 hrs:   BP Temp Temp src Pulse Resp SpO2   22 1346 118/65 97.6 °F (36.4 °C) Temporal 89 18 100 %       Description of Procedure:    After a brief physical assessment and failure to improve with conservative measures the patient presented for Cervical Trigger Point Injections The indications, limitations and possible complications were discussed with the patient and the patient elected to proceed with the procedure. After voluntary, informed and signed consent obtained the patient was placed in a seated position. Appropriate time out was obtained per policy. The area of maximal tenderness was palpated over the   bilaterally Cervical muscles - Splenius  Trapezius  Rhomboid The skin overlying these areas was marked with a skin marker. The skin overlying the proposed injection sites were then sprayed with Gebauer's Solution while protecting patient eyes. The areas were prepped using aseptic technique with CHG prep. Each trigger point of the Cervical muscles - Splenius  Trapezius  Rhomboid was injected with approximately 1-2 ml of a solution of 5 ml of 1% Lidocaine Plain and 5 ml of 0.5% Marcaine Plain after negative aspiration    Discharge: The patient tolerated the procedure well. There were no complications during the procedure and the patient was discharged home with discharge instructions. The patient has been instructed to contact the office should there be any complications or questions to arise between today and their next appointment.     Plan:    Patient to follow up in the office in about 6 weeks.      Jose Ortiz, APRN - CNP, 2/2/2022 at 3:35 PM

## 2022-02-09 ENCOUNTER — TELEPHONE (OUTPATIENT)
Dept: PAIN MANAGEMENT | Age: 47
End: 2022-02-09

## 2022-02-09 DIAGNOSIS — M79.18 MYOFASCIAL PAIN: ICD-10-CM

## 2022-02-16 ENCOUNTER — HOSPITAL ENCOUNTER (OUTPATIENT)
Dept: PAIN MANAGEMENT | Age: 47
Discharge: HOME | End: 2022-02-16
Payer: MEDICARE

## 2022-02-16 VITALS
TEMPERATURE: 98.9 F | OXYGEN SATURATION: 100 % | HEART RATE: 79 BPM | RESPIRATION RATE: 18 BRPM | DIASTOLIC BLOOD PRESSURE: 56 MMHG | SYSTOLIC BLOOD PRESSURE: 118 MMHG

## 2022-02-16 DIAGNOSIS — M79.18 MYOFASCIAL PAIN: ICD-10-CM

## 2022-02-16 PROCEDURE — 64615 CHEMODENERV MUSC MIGRAINE: CPT | Performed by: NURSE PRACTITIONER

## 2022-02-16 PROCEDURE — 20553 NJX 1/MLT TRIGGER POINTS 3/>: CPT

## 2022-02-16 PROCEDURE — 2580000003 HC RX 258

## 2022-02-16 PROCEDURE — A4216 STERILE WATER/SALINE, 10 ML: HCPCS

## 2022-02-16 PROCEDURE — 2500000003 HC RX 250 WO HCPCS

## 2022-02-16 PROCEDURE — 20553 NJX 1/MLT TRIGGER POINTS 3/>: CPT | Performed by: NURSE PRACTITIONER

## 2022-02-16 PROCEDURE — 64615 CHEMODENERV MUSC MIGRAINE: CPT

## 2022-02-16 PROCEDURE — 6360000002 HC RX W HCPCS

## 2022-02-16 RX ORDER — LIDOCAINE HYDROCHLORIDE 10 MG/ML
10 INJECTION, SOLUTION EPIDURAL; INFILTRATION; INTRACAUDAL; PERINEURAL ONCE
Status: DISCONTINUED | OUTPATIENT
Start: 2022-02-16 | End: 2022-02-18 | Stop reason: HOSPADM

## 2022-02-16 RX ORDER — BUPIVACAINE HYDROCHLORIDE 5 MG/ML
10 INJECTION, SOLUTION EPIDURAL; INTRACAUDAL ONCE
Status: DISCONTINUED | OUTPATIENT
Start: 2022-02-16 | End: 2022-02-18 | Stop reason: HOSPADM

## 2022-02-16 RX ORDER — SODIUM CHLORIDE 9 MG/ML
4.5 INJECTION INTRAVENOUS ONCE
Status: DISCONTINUED | OUTPATIENT
Start: 2022-02-16 | End: 2022-02-18 | Stop reason: HOSPADM

## 2022-02-16 NOTE — PROCEDURES
[address]    Patient Name: Shani Jones    : 1975                    Age: 51 y.o. Sex: female    Date: 2022    Pre-op Diagnosis: Chronic Migraines    Post-op Diagnosis: Chronic Migraines    Procedure: Botox injections x 155 units (45 units wasted)    Performing Procedure: AMNA Ling    Patient Vitals for the past 24 hrs:   BP Temp Temp src Pulse Resp SpO2   22 1339 (!) 118/56 98.9 °F (37.2 °C) Temporal 79 18 100 %       Previous Injections:  Patient had 30/15 headaches/migraines over the past month. Indications:    Shani Jones has been treated for problems with chronic migraine headaches. The patient was taken through a conservative course of treatment without complete resolution of symptoms. Botox injection therapy was offered and after the risks and benefits of the procedure were explained to the patient, we had agreed to proceed. The patient was taken to the procedure room and placed in the seated position. The following muscles were identified using palpation and standard landmarks. These muscles were marked and prepped with alcohol. A total of 155 units were injected after careful aspiration and the following distribution bilaterally:  10 units in 2 sites, procerus 5 units in one site, frontalis 20 units in 4 sites, temporalis 40 unit in 8 sites, occipitals 30 units in 6 sites, cervical paraspinals 20 units in 4 sites, and trapezius 30 units in 6 sites. Discharge: The patient tolerated the procedure well. There were no complications during the procedure and the patient was discharged home with discharge instructions. The patient has been instructed to contact the office should there be any complications or questions to arise between today and their next appointment. Plan:    Patient to follow up in the office in about 6 weeks.      AMNA Rivero - CNP, 2022 at 2:09 PM           [facility] Physical & Pain Medicine    Patient Name: Babs Bess    : 1975                    Age: 51 y.o. Sex: female    Date: 2022    Pre-op Diagnosis: Myofascial Pain/ Muscle Spasms    Post-op Diagnosis: Myofascial Pain/ Muscle Spasms    Procedure: Thoracic Trigger Point Injections    Performing Procedure: SANDRO Peterson    Patient Vitals for the past 24 hrs:   BP Temp Temp src Pulse Resp SpO2   22 1339 (!) 118/56 98.9 °F (37.2 °C) Temporal 79 18 100 %       Description of Procedure:  After a brief physical assessment and failure to improve with conservative measures the patient presented for Thoracic Trigger Point Injections The indications, limitations and possible complications were discussed with the patient and the patient elected to proceed with the procedure. After voluntary, informed and signed consent obtained the patient was placed in a seated position. Appropriate time out was obtained per policy. The areas were then prepped in a sterile fashion with Chloro-Prep. The area of maximal tenderness was palpated over the bilaterally Thoracic Muscles - Erector Spinae, Upper/Mid Latissimus, Rhomboid Minor, Rhomboid Major. The skin overlying these areas was marked with a skin marker. The areas were prepped using aseptic technique with CHG prep. The skin overlying the proposed injection sites were then sprayed with Gebauer's Solution while protecting patient eyes. Each trigger point of the Thoracic Muscles - Erector Spinae, Upper/Mid Latissimus, Rhomboid Minor, Rhomboid Major was injected after negative aspiration was injected with approximately 1-2 ml of a solution of 5 ml of 1% Lidocaine Plain and 5 ml of 0.5% Marcaine Plain after negative aspiration    Discharge: The patient tolerated the procedure well. There were no complications during the procedure and the patient was discharged home with discharge instructions.     The patient has been instructed to contact the office should there be any complications or questions to arise between today and their next appointment. Plan:     Will return to the office in 6 weeks for follow up    AMNA Hernández CNP, 2/16/2022 at 2:09 PM

## 2022-02-22 ENCOUNTER — TELEPHONE (OUTPATIENT)
Dept: PAIN MANAGEMENT | Age: 47
End: 2022-02-22

## 2022-02-22 DIAGNOSIS — M79.18 MYOFASCIAL PAIN: ICD-10-CM

## 2022-02-25 DIAGNOSIS — G89.29 CHRONIC BILATERAL LOW BACK PAIN WITHOUT SCIATICA: Chronic | ICD-10-CM

## 2022-02-25 DIAGNOSIS — M54.50 CHRONIC BILATERAL LOW BACK PAIN WITHOUT SCIATICA: Chronic | ICD-10-CM

## 2022-02-25 DIAGNOSIS — Z86.19 HISTORY OF SHINGLES: ICD-10-CM

## 2022-02-25 RX ORDER — IBUPROFEN 800 MG/1
800 TABLET ORAL 3 TIMES DAILY PRN
Qty: 90 TABLET | Refills: 2 | OUTPATIENT
Start: 2022-02-25

## 2022-02-25 RX ORDER — LIDOCAINE 50 MG/G
3 PATCH TOPICAL DAILY
Refills: 2 | OUTPATIENT
Start: 2022-02-25

## 2022-03-01 DIAGNOSIS — M54.50 CHRONIC BILATERAL LOW BACK PAIN WITHOUT SCIATICA: Chronic | ICD-10-CM

## 2022-03-01 DIAGNOSIS — G89.29 CHRONIC BILATERAL LOW BACK PAIN WITHOUT SCIATICA: Chronic | ICD-10-CM

## 2022-03-02 ENCOUNTER — HOSPITAL ENCOUNTER (OUTPATIENT)
Dept: PAIN MANAGEMENT | Age: 47
Discharge: HOME | End: 2022-03-02
Payer: MEDICARE

## 2022-03-02 VITALS
DIASTOLIC BLOOD PRESSURE: 60 MMHG | SYSTOLIC BLOOD PRESSURE: 117 MMHG | TEMPERATURE: 98 F | RESPIRATION RATE: 18 BRPM | OXYGEN SATURATION: 100 % | HEART RATE: 75 BPM

## 2022-03-02 PROCEDURE — 6360000002 HC RX W HCPCS

## 2022-03-02 PROCEDURE — 76942 ECHO GUIDE FOR BIOPSY: CPT | Performed by: NURSE PRACTITIONER

## 2022-03-02 PROCEDURE — 20611 DRAIN/INJ JOINT/BURSA W/US: CPT

## 2022-03-02 PROCEDURE — 2500000003 HC RX 250 WO HCPCS

## 2022-03-02 PROCEDURE — 20552 NJX 1/MLT TRIGGER POINT 1/2: CPT | Performed by: NURSE PRACTITIONER

## 2022-03-02 RX ORDER — LIDOCAINE HYDROCHLORIDE 10 MG/ML
4 INJECTION, SOLUTION EPIDURAL; INFILTRATION; INTRACAUDAL; PERINEURAL ONCE
Status: DISCONTINUED | OUTPATIENT
Start: 2022-03-02 | End: 2022-03-04 | Stop reason: HOSPADM

## 2022-03-02 RX ORDER — BUPIVACAINE HYDROCHLORIDE 5 MG/ML
4 INJECTION, SOLUTION EPIDURAL; INTRACAUDAL ONCE
Status: DISCONTINUED | OUTPATIENT
Start: 2022-03-02 | End: 2022-03-04 | Stop reason: HOSPADM

## 2022-03-02 RX ORDER — HYDROCODONE BITARTRATE AND ACETAMINOPHEN 5; 325 MG/1; MG/1
1 TABLET ORAL EVERY 8 HOURS PRN
Qty: 60 TABLET | Refills: 0 | Status: SHIPPED | OUTPATIENT
Start: 2022-03-02 | End: 2022-03-30 | Stop reason: SDUPTHER

## 2022-03-02 RX ORDER — TRIAMCINOLONE ACETONIDE 40 MG/ML
80 INJECTION, SUSPENSION INTRA-ARTICULAR; INTRAMUSCULAR ONCE
Status: DISCONTINUED | OUTPATIENT
Start: 2022-03-02 | End: 2022-03-04 | Stop reason: HOSPADM

## 2022-03-02 NOTE — PROCEDURES
[facility] Physical & Pain Medicine    Patient Name: Harrold Osgood    : 1975    Age: 49 y.o. Sex: female    Date: 2022    Pre-op Diagnosis: bilateral  Sacroiliac Joint(s) Dysfunction/ Sacroiliitis    Post-op Diagnosis: bilateral  Sacroiliac Joint(s) Dysfunction/ Sacroliliits    Procedure: Ultrasound Guided Injection of  bilateral Sacroiliac Joint(s)     Performing Procedure:  SANDRO Koo - BC, VA-BC    Patient Vitals for the past 24 hrs:   BP Temp Temp src Pulse Resp SpO2   22 1307 117/60 98 °F (36.7 °C) Temporal 75 18 100 %       bilateral  Sacroiliac Joint(s)     Description of Procedure:    After voluntary, informed and signed consent obtained the patient was placed in a prone position. Appropriate time out was obtained per policy. The Sacroiliac Joint(s) was palpated for area of maximal tenderness. The area was prepped in an aseptic fashion with CHG swab. The ultrasound transducer was used to confirm the appropriate location. The skin was sprayed with Gebauer's Solution. Under aseptic technique and direct ultrasound visualization a 22 gauge 3 inch spinal needle was introduced into the Sacroiliac Joint(s). After a negative aspiration, a solution of 2 ml of 0.5% Marcaine Plain and 2 ml of 1% Lidocaine Plain and 1 ml of Kenalog (40 mg/ml) was injected into the Sacroiliac Joint(s). The needle was withdrawn and a sterile dressing applied. If this was a bilateral procedure, the same steps were followed on the opposite side. Discharge: The patient tolerated the procedure well. There were no complications during the procedure and the patient was discharged home with discharge instructions. The patient has been instructed to contact the office should there be any complications or questions to arise between today and their next appointment. Plan:    The patient will follow up in the office in approximately 6 weeks.      AMNA Carranza CNP, 3/2/2022 at 3:11 PM

## 2022-03-08 ENCOUNTER — TELEPHONE (OUTPATIENT)
Dept: PAIN MANAGEMENT | Age: 47
End: 2022-03-08

## 2022-03-08 DIAGNOSIS — M79.18 MYOFASCIAL PAIN: ICD-10-CM

## 2022-03-25 DIAGNOSIS — M54.50 CHRONIC BILATERAL LOW BACK PAIN WITHOUT SCIATICA: Chronic | ICD-10-CM

## 2022-03-25 DIAGNOSIS — G89.29 CHRONIC BILATERAL LOW BACK PAIN WITHOUT SCIATICA: Chronic | ICD-10-CM

## 2022-03-25 DIAGNOSIS — Z86.19 HISTORY OF SHINGLES: ICD-10-CM

## 2022-03-25 RX ORDER — IBUPROFEN 800 MG/1
800 TABLET ORAL 3 TIMES DAILY PRN
Qty: 90 TABLET | Refills: 1 | OUTPATIENT
Start: 2022-03-25

## 2022-03-25 RX ORDER — LIDOCAINE 50 MG/G
3 PATCH TOPICAL DAILY
Refills: 1 | OUTPATIENT
Start: 2022-03-25

## 2022-03-30 DIAGNOSIS — M54.50 CHRONIC BILATERAL LOW BACK PAIN WITHOUT SCIATICA: Chronic | ICD-10-CM

## 2022-03-30 DIAGNOSIS — G89.29 CHRONIC BILATERAL LOW BACK PAIN WITHOUT SCIATICA: Chronic | ICD-10-CM

## 2022-04-03 RX ORDER — HYDROCODONE BITARTRATE AND ACETAMINOPHEN 5; 325 MG/1; MG/1
1 TABLET ORAL EVERY 8 HOURS PRN
Qty: 60 TABLET | Refills: 0 | Status: SHIPPED | OUTPATIENT
Start: 2022-04-03 | End: 2022-04-28 | Stop reason: SDUPTHER

## 2022-04-22 DIAGNOSIS — G43.019 INTRACTABLE MIGRAINE WITHOUT AURA AND WITHOUT STATUS MIGRAINOSUS: Chronic | ICD-10-CM

## 2022-04-22 RX ORDER — BUTALBITAL, ACETAMINOPHEN AND CAFFEINE 50; 325; 40 MG/1; MG/1; MG/1
1 TABLET ORAL DAILY PRN
Qty: 30 TABLET | Refills: 2 | OUTPATIENT
Start: 2022-04-22

## 2022-04-22 RX ORDER — SUMATRIPTAN 50 MG/1
50 TABLET, FILM COATED ORAL
Qty: 9 TABLET | Refills: 3 | OUTPATIENT
Start: 2022-04-22 | End: 2022-04-22

## 2022-04-28 ENCOUNTER — HOSPITAL ENCOUNTER (OUTPATIENT)
Dept: PAIN MANAGEMENT | Age: 47
Discharge: HOME | End: 2022-04-28
Payer: MEDICARE

## 2022-04-28 VITALS
BODY MASS INDEX: 24.27 KG/M2 | RESPIRATION RATE: 16 BRPM | WEIGHT: 137 LBS | HEIGHT: 63 IN | TEMPERATURE: 98 F | OXYGEN SATURATION: 100 % | SYSTOLIC BLOOD PRESSURE: 129 MMHG | HEART RATE: 87 BPM | DIASTOLIC BLOOD PRESSURE: 81 MMHG

## 2022-04-28 DIAGNOSIS — G89.29 CHRONIC BILATERAL LOW BACK PAIN WITHOUT SCIATICA: Chronic | ICD-10-CM

## 2022-04-28 DIAGNOSIS — M54.50 CHRONIC BILATERAL LOW BACK PAIN WITHOUT SCIATICA: Chronic | ICD-10-CM

## 2022-04-28 DIAGNOSIS — G43.019 INTRACTABLE MIGRAINE WITHOUT AURA AND WITHOUT STATUS MIGRAINOSUS: Primary | Chronic | ICD-10-CM

## 2022-04-28 PROCEDURE — 99214 OFFICE O/P EST MOD 30 MIN: CPT | Performed by: NURSE PRACTITIONER

## 2022-04-28 PROCEDURE — 99215 OFFICE O/P EST HI 40 MIN: CPT

## 2022-04-28 RX ORDER — TRAZODONE HYDROCHLORIDE 50 MG/1
50 TABLET ORAL NIGHTLY
COMMUNITY
Start: 2022-04-22 | End: 2022-07-14

## 2022-04-28 RX ORDER — LISDEXAMFETAMINE DIMESYLATE 70 MG/1
70 CAPSULE ORAL DAILY
COMMUNITY
Start: 2022-04-22 | End: 2022-09-14

## 2022-04-28 RX ORDER — LAMOTRIGINE 200 MG/1
200 TABLET ORAL NIGHTLY
COMMUNITY
Start: 2022-04-22

## 2022-04-28 RX ORDER — ALPRAZOLAM 1 MG/1
1 TABLET ORAL 2 TIMES DAILY PRN
COMMUNITY
Start: 2022-04-22

## 2022-04-28 RX ORDER — HYDROCODONE BITARTRATE AND ACETAMINOPHEN 5; 325 MG/1; MG/1
1 TABLET ORAL EVERY 8 HOURS PRN
Qty: 60 TABLET | Refills: 0 | Status: SHIPPED | OUTPATIENT
Start: 2022-05-04 | End: 2022-06-07

## 2022-04-28 ASSESSMENT — PAIN DESCRIPTION - LOCATION
LOCATION_2: NECK
LOCATION_3: HEAD
LOCATION: BACK

## 2022-04-28 ASSESSMENT — ENCOUNTER SYMPTOMS
NAUSEA: 1
VISUAL CHANGE: 1
BLURRED VISION: 1
CONSTIPATION: 0
PHOTOPHOBIA: 1
VOMITING: 1
SCALP TENDERNESS: 1
BACK PAIN: 1

## 2022-04-28 ASSESSMENT — PAIN DESCRIPTION - INTENSITY
RATING_2: 3
RATING_3: 4

## 2022-04-28 ASSESSMENT — PAIN DESCRIPTION - ORIENTATION
ORIENTATION: LOWER;UPPER
ORIENTATION_2: LOWER

## 2022-04-28 ASSESSMENT — PAIN SCALES - GENERAL: PAINLEVEL_OUTOF10: 3

## 2022-04-28 ASSESSMENT — PAIN DESCRIPTION - PAIN TYPE: TYPE: CHRONIC PAIN

## 2022-04-28 NOTE — TELEPHONE ENCOUNTER
Requested Prescriptions     Signed Prescriptions Disp Refills    HYDROcodone-acetaminophen (NORCO) 5-325 MG per tablet 60 tablet 0     Sig: Take 1 tablet by mouth every 8 hours as needed for Pain for up to 30 days.      Authorizing Provider: Padmini Solano

## 2022-04-28 NOTE — PROGRESS NOTES
[facility]    Office Visit    Patient Name: Tammy Suárez    MR #: [id number]    Account [de-identified]    : 1975    Age: 49 y.o. Sex: female    Date: 2028    PCP: Joseph Lebron MD    Chief Complaint:   Chief Complaint   Patient presents with    Back Pain    Neck Pain    Migraine       History of Present Illness: The patient is a 49 y.o. female who presents for procedure follow up. Patient had bilateral lumbar trigger points on 2022. Patient had bilateral cervical trigger point injections on 2022. Patient had thoracic trigger points on 2022. Patient had bilateral SI joint injections on 3/2/2022. Patient had at least 85% relief of pain from procedure(s) for at least 6 weeks and was able to increase activity after procedure. Patient received enough pain relief from injections that the patient would like to repeat the injection(s). Patient had Botox for migraines on 2022. Patient had over 100 hours of migraine freedom per month with a decrease in severity, in intensity and in aggravating symptoms compared to pre-treatment from Botox. Patient has been having more migraines in the past month or two. She states that she is having at least 3 migraines per month. She cannot identify any specific trigger. She states that the Imitrex does not help at all anymore. She tried Ubrevly and she did have some relief. Back Pain  This is a chronic problem. The current episode started more than 1 year ago. The problem occurs intermittently. The problem has been waxing and waning since onset. The pain is present in the lumbar spine and sacro-iliac. The quality of the pain is described as aching and cramping. The symptoms are aggravated by bending, sitting and standing. Associated symptoms include headaches. Neck Pain   This is a chronic problem. The current episode started more than 1 year ago. The problem occurs constantly.  The problem has been waxing and waning. The quality of the pain is described as aching and cramping. Associated symptoms include headaches, photophobia and a visual change. Headache   This is a chronic problem. The current episode started more than 1 year ago. The problem occurs daily. The problem has been waxing and waning (recently patient is having more migraines and she has one at this appointment). The pain is located in the bilateral, frontal and occipital region. The quality of the pain is described as squeezing, throbbing, stabbing and aching. Associated symptoms include back pain, blurred vision, nausea, neck pain, phonophobia, photophobia, scalp tenderness, a visual change and vomiting. The symptoms are aggravated by bright light, activity, emotional stress and noise. Migraine  Associated symptoms include arthralgias, headaches, myalgias, nausea, neck pain, a visual change and vomiting. Screening Tools:    PEG Score: 2     Last PEG Score: 4     Annual ORT Score: 2     Annual PHQ Score: 13    Current Pain Assessment  Pain Assessment  Pain Assessment: 0-10  Pain Level: 3  Pain Location: Back  Pain Orientation: Lower,Upper  Pain Type: Chronic pain    Past Visit HPI:  3/1/2021  presents for follow up of procedure. Patient had Lumbar trigger point injections on 11/18/2020, Cervical and thoracic trigger point injections on 1/6/2021, and Botox for Migraines on 1/13/2021. Patient had at least 85% relief of pain from procedure(s) for at least 6 weeks and was able to increase activity after procedure. Patient received enough pain relief from injections that the patient would like to repeat the injection(s). Patient states that she does not have to use abortive migraine medications until the last two to four weeks prior to Botox injections and sometimes the two weeks after receiving injections. Patient will take Fioricet first especially is she is not at home then she will take Imitrex.  The Imitrex causes her to be very drowsy. Patient is behind on lumbar injections due to providers scheduled and her low back is bothering her a little more right now. 11/10/2020  presents for procedure follow-up. Patient had bilateral SI joint injections with lumbar trigger points on 7/1/2020, patient had cervical and thoracic trigger points on 7/15/2020, and patient had Botox injections on 9/2/2020. Patient had at least 85% relief of pain from procedure(s) for at least 6 weeks and was able to increase activity after procedure. Patient received enough pain relief from injections that the patient would like to repeat the injection(s).    6/22/2020  presents to the office for annual exam with primary complaints of chronic headaches and back pain. Patient was referred to this office last April 2018 with a past history of chiari malformation. Patient had a posterior fossa decompression and her pain had pretty much resolved for approximately 3 years. However during the year prior to coming back to this office patient started developing pain again. She started having chronic headaches, along with myofascial pain in the shoulders and thoracic spine. Through the years patient has been on numerous medications to treat headaches including the following but not limited to Topamax, amitriptyline, propranolol, Imitrex, gabapentin, and Lyrica, Fioricet does help with the intense headaches but he does not make headaches tolerable. She does follow with neurology and she has been started on Aimovig which helps. Patient had been getting bilateral occipital nerve blocks along with trigger point injections in her cervical, thoracic, and lumbar spine. Patient was changed to botox injections in October 2019. Patient has had excellent results from botox injections. Patient did have a flare up of low back pain. She has had SI injections which have made low back pain improve.      Past Medical History  Past Medical History:   Diagnosis Date    ADHD (attention deficit hyperactivity disorder)     Anemia     Anxiety     Cervicalgia     Chiari malformation type I ([facility] [address]     s/p decompression 2014    Chronic back pain     Depression     Headache(784.0)     Hiatal hernia     Hyperlipidemia     Indigestion     Migraines     Obstructive sleep apnea     Mild with an AHI:  7.5 and  RDI:  14.7       Medications  Current Outpatient Medications   Medication Sig Dispense Refill    traZODone (DESYREL) 50 MG tablet Take 50 mg by mouth nightly      ALPRAZolam (XANAX) 1 MG tablet Take 1 mg by mouth 2 times daily as needed.  lamoTRIgine (LAMICTAL) 200 MG tablet Take 200 mg by mouth nightly      VYVANSE 70 MG capsule Take 70 mg by mouth daily.  HYDROcodone-acetaminophen (NORCO) 5-325 MG per tablet Take 1 tablet by mouth every 8 hours as needed for Pain for up to 30 days. (may fill 4/1/22) 60 tablet 0    butalbital-acetaminophen-caffeine (FIORICET, ESGIC) -40 MG per tablet Take 1 tablet by mouth daily as needed for Headaches (may repeat in 4 hours x 1 no more than 2 per day) 30 tablet 2    ibuprofen (ADVIL;MOTRIN) 800 MG tablet Take 1 tablet by mouth 3 times daily as needed for Pain 90 tablet 2    lidocaine (LIDODERM) 5 % Place 3 patches onto the skin daily 12 hours on, 12 hours off. 30 patch 2    SUMAtriptan (IMITREX) 50 MG tablet Take 1 tablet by mouth once as needed for Migraine 9 tablet 3    FLUoxetine (PROZAC) 20 MG capsule Take 40 mg by mouth daily       Ubrogepant (UBRELVY) 100 MG TABS Take 100 mg by mouth daily as needed (migraines) 10 tablet 2    zolpidem (AMBIEN) 10 MG tablet Take 1 tablet by mouth nightly. .  2     No current facility-administered medications for this encounter. Allergies  Ultram [tramadol]    Current Medications  Current Outpatient Medications   Medication Sig Dispense Refill    traZODone (DESYREL) 50 MG tablet Take 50 mg by mouth nightly      ALPRAZolam (XANAX) 1 MG tablet Take 1 mg by mouth 2 times daily as needed.  lamoTRIgine (LAMICTAL) 200 MG tablet Take 200 mg by mouth nightly      VYVANSE 70 MG capsule Take 70 mg by mouth daily.  HYDROcodone-acetaminophen (NORCO) 5-325 MG per tablet Take 1 tablet by mouth every 8 hours as needed for Pain for up to 30 days. (may fill 4/1/22) 60 tablet 0    butalbital-acetaminophen-caffeine (FIORICET, ESGIC) -40 MG per tablet Take 1 tablet by mouth daily as needed for Headaches (may repeat in 4 hours x 1 no more than 2 per day) 30 tablet 2    ibuprofen (ADVIL;MOTRIN) 800 MG tablet Take 1 tablet by mouth 3 times daily as needed for Pain 90 tablet 2    lidocaine (LIDODERM) 5 % Place 3 patches onto the skin daily 12 hours on, 12 hours off. 30 patch 2    SUMAtriptan (IMITREX) 50 MG tablet Take 1 tablet by mouth once as needed for Migraine 9 tablet 3    FLUoxetine (PROZAC) 20 MG capsule Take 40 mg by mouth daily       Ubrogepant (UBRELVY) 100 MG TABS Take 100 mg by mouth daily as needed (migraines) 10 tablet 2    zolpidem (AMBIEN) 10 MG tablet Take 1 tablet by mouth nightly. .  2     No current facility-administered medications for this encounter.        Social History    Social History     Socioeconomic History    Marital status:      Spouse name: None    Number of children: 2    Years of education: None    Highest education level: None   Occupational History    None   Tobacco Use    Smoking status: Never Smoker    Smokeless tobacco: Never Used   Vaping Use    Vaping Use: Never used   Substance and Sexual Activity    Alcohol use: No    Drug use: No    Sexual activity: Yes     Partners: Male   Other Topics Concern    None   Social History Narrative    None     Social Determinants of Health     Financial Resource Strain:     Difficulty of Paying Living Expenses: Not on file   Food Insecurity:     Worried About Running Out of Food in the Last Year: Not on file    Vlad of Food in the Last Year: Not on file   Transportation Needs:     Lack of Transportation (Medical): Not on file    Lack of Transportation (Non-Medical): Not on file   Physical Activity:     Days of Exercise per Week: Not on file    Minutes of Exercise per Session: Not on file   Stress:     Feeling of Stress : Not on file   Social Connections:     Frequency of Communication with Friends and Family: Not on file    Frequency of Social Gatherings with Friends and Family: Not on file    Attends Evangelical Services: Not on file    Active Member of [address] [place] [address] [employer] or Organizations: Not on file    Attends Club or Organization Meetings: Not on file    Marital Status: Not on file   Intimate Partner Violence:     Fear of Current or Ex-Partner: Not on file    Emotionally Abused: Not on file    Physically Abused: Not on file    Sexually Abused: Not on file   Housing Stability:     Unable to Pay for Housing in the Last Year: Not on file    Number of [place] in the Last Year: Not on file    Unstable Housing in the Last Year: Not on file         Family History  family history includes Cancer in her maternal aunt and another family member; High Blood Pressure in her father and sister. Review of Systems:  Review of Systems   Constitutional: Positive for activity change. Eyes: Positive for blurred vision, photophobia and visual disturbance. Gastrointestinal: Positive for nausea and vomiting. Negative for constipation. Musculoskeletal: Positive for arthralgias, back pain, myalgias, neck pain and neck stiffness. Neurological: Positive for headaches. Psychiatric/Behavioral: Positive for sleep disturbance. Negative for agitation, self-injury and suicidal ideas. The patient is not nervous/anxious.          14 point ROS negative besides that noted in HPI    Physical exam:     Patient Vitals for the past 24 hrs:   BP Temp Temp src Pulse Resp SpO2 Height Weight   04/28/22 1556 129/81 98 °F (36.7 °C) Temporal 87 16 100 % 5' 3\" (1.6 m) 137 lb (62.1 kg)       Body mass index is 24.27 kg/m². Physical Exam  Vitals and nursing note reviewed. Constitutional:       General: She is not in acute distress. Appearance: She is well-developed. HENT:      Head: Normocephalic. Right Ear: External ear normal.      Left Ear: External ear normal.      Nose: Nose normal.   Eyes:      Conjunctiva/sclera: Conjunctivae normal.      Pupils: Pupils are equal, round, and reactive to light. Cardiovascular:      Rate and Rhythm: Normal rate and regular rhythm. Pulmonary:      Effort: Pulmonary effort is normal.      Breath sounds: Normal breath sounds. Abdominal:      General: Bowel sounds are normal.      Palpations: Abdomen is soft. Musculoskeletal:      Cervical back: Normal range of motion and neck supple. Spasms (Noted to have tender palpable knots noted in muscles ) and tenderness present. Thoracic back: Spasms (Noted to have tender palpable knots noted in muscles ) and tenderness present. Lumbar back: Spasms (Noted to have tender palpable knots noted in muscles ) and tenderness (Positive Bilateral  SI tenderness noted. 3 + provacative test) present. Skin:     General: Skin is warm and dry. Neurological:      Mental Status: She is alert and oriented to person, place, and time. Cranial Nerves: No cranial nerve deficit. Sensory: No sensory deficit. Deep Tendon Reflexes: Reflexes are normal and symmetric. Psychiatric:         Behavior: Behavior normal.         Thought Content:  Thought content normal.         Judgment: Judgment normal.         LABS:     Lab Results   Component Value Date     08/27/2011    K 4.0 08/27/2011     08/27/2011    CO2 32 08/27/2011    BUN 10 08/27/2011    CREATININE 0.8 08/27/2011    GLUCOSE 94 08/27/2011    CALCIUM 9.3 08/27/2011        Lab Results   Component Value Date    WBC 6.87 08/27/2011    HGB 12.6 08/27/2011    HCT 38.0 08/27/2011    MCV 91.1 08/27/2011     08/27/2011       Assessment: Active Problems:    Restless leg syndrome    Chiari malformation type I (HCC)    Intractable migraine without aura and without status migrainosus    Myofascial pain    SI (sacroiliac) joint dysfunction    Chronic bilateral low back pain without sciatica    Pain management contract agreement  Resolved Problems:    * No resolved hospital problems. *      PLAN:  Medications:   Intractable migraine without aura and without status migrainosus  Stop - patient states that medication is ineffective  SUMAtriptan (IMITREX) 50 MG tablet; Take 1 tablet by mouth once as needed for Migraine  Dispense: 9 tablet; Refill: 3    Continue medication with no refill sent at appointment see refill encounter. butalbital-acetaminophen-caffeine (FIORICET, ESGIC) -40 MG per tablet; Take 1 tablet by mouth daily as needed for Headaches (may repeat in 4 hours x 1 no more than 2 per day)  Dispense: 30 tablet; Refill: 2    Start Nurtec. Patient given samples. She does have nausea with her migraines therefore nurtec may be a better option as this is dissolvable. - Rimegepant Sulfate 75 MG TBDP; Take 75 mg by mouth every 48 hours as needed (migraines)  Dispense: 30 tablet; Refill: 5      Chronic bilateral low back pain without sciatica  Continue medication with refill sent at appointment see refill encounter. HYDROcodone-acetaminophen (NORCO) 5-325 MG per tablet; Take 1 tablet by mouth every 8 hours as needed for Pain for up to 30 days. Take lowest dose possible to manage pain  Dispense: 60 tablet; Refill: 0    Continue medication with no refill sent at appointment see refill encounter. Ibuprofen (Advil) 800 mg TID prn # 90 with 2 RF's    Continue medication with no refill sent at appointment see refill encounter.    Lidocaine (lidoderm) 5% 1-3 patches transdermal daily # 30 2 RF's    Treatments:   Continue and Schedule Botox for migraine. Patient obtained 50% reduction in mean headache days per month with a decrease in severity, intensity, and aggravating symptoms as compared with pretreatment and patient had over 100 hours of migraine freedom per month after Botox    Continue and Schedule cervical and thoracic trigger point injections for myofascial pain as patient has had good response in the past.    Continue and Schedule lumbar trigger point injections for myofacial pain as patient has had a good response to these injections in the past.     Continue and Schedule bilateral SI joint injections with ultrasound for SI joint dysfunction and chronic low back pain as patient has had good response with these before. Continue current treatment plan. Patient's pain remains tolerable. Will try new abortive medication for migraines to see if this helps with decrease the migraines that breakthrough with Botox. [x] Follow up    [] 4 weeks   [x] 6-8 weeks   [] 10-12 weeks   [] 3 months  [x] Post procedure to evaluate effectiveness of treatment  [] To evaluate medications changes made at office visit. [] To review diagnostics ordered at last visit. [] To evaluate response to therapy    [x] For management of controlled substance  [x] Random UDS as indicated by ORT score or if indicated by abberent behaviors    Discussion: Discussed exam findings and plan of care with patient. Patient agreed with POC and questions were asked and answered. Activity: discussed exercise as beneficial to pain reduction, encouraged stretching exercise with a focus on torso strengthening.     Goals:  Pain Management Goals of Therapy:   [] Resolution in pain  [x] Decrease in pain level  [x] Improvement in ADL's  [x] Increase in activities with less pain  [] Decrease in medication      Controlled substance monitoring:    [x] Discussed medication side effects, risk of tolerance and/or dependence, and/or alternative treatment  [] Discussed the detrimental effects of long term narcotic use in younger patients especially women of childbearing years. [x] No signs and symptoms of potential drug abuse or diversion were identified  [] Signs of potential drug abuse or diversion were identified   [] ORT Score   [] UDS non-compliant   [] See Note  [x] Random urine drug screen sent today saliva  [] Random urine drug screen not completed today   [] Deferred New Patient  [] Compliant 9/9/2021  [] Not Compliant see note  [] Medication agreement with provider signed today 6/22/2020  [x] Medication agreement with provider on file under media   [] Medication regimen effective with c/o of side effects noted and continued   [] New patient continuing current medication while developing POC   [x] On going assessment and evaluation of medication regimen  [] Medication regimen not effective see plan for changes  [x] Alfred Davison reviewed & on file under media     CC: MD Mariam Arteaga, APRN - CNP, 4/28/2022 at 4:10 PM    EMR dragon/transcription disclaimer: Much of this encounter note is electronic transcription/translation of spoken language to printed tach. Electronic translation of spoken language may be erroneous, or at times, nonsensical words or phrases may be inadvertently transcribed.  Although, I have reviewed the note for such errors, some may still exist.

## 2022-04-28 NOTE — PROGRESS NOTES
Clinic Documentation      Education Provided:  [x] Review of Fidelia Griffiths  [x] Agreement Review  [x] PEG Score Calculated [x] PHQ Score Calculated [x] ORT Score Calculated    [] Compliance Issues Discussed [] Cognitive Behavior Needs [x] Exercise [] Review of Test [] Financial Issues  [x] Tobacco/Alcohol Use Reviewed [x] Teaching [] New Patient [] Picture Obtained    Physician Plan:  [] Outgoing Referral  [] Pharmacy Consult  [] Test Ordered [x] Prescription Ordered/Changed   [] Obtained Test Results / Consult Notes        Complete if patient is withholding blood thinner for procedure     Blood Thinner Patient is currently taking:      [] Plavix (Hold for 7 days)  [] Aspirin (Hold for 5 days)     [] Pletal (Hold for 2 days)  [] Pradaxa (Hold for 3 days)    [] Effient (Hold for 7 days)  [] Xarelto (Hold for 2 days)    [] Eliquis (Hold for 2 days)  [] Brilinta (Hold for 7 days)    [] Coumadin (Hold for 5 days) - (INR needs to be drawn the day prior to procedure- INR < 2.0)    [] Aggrenox (Hold for 7 days)        [] Patient will stop medication on their own.    [] Blood Thinner Form Faxed for approval to hold.    Provider form faxed to:   Assessment Completed by:  Electronically signed by Zach Concepcion RN on 4/28/2022 at 4:39 PM

## 2022-05-11 ENCOUNTER — HOSPITAL ENCOUNTER (OUTPATIENT)
Dept: PAIN MANAGEMENT | Age: 47
Discharge: HOME | End: 2022-05-11
Payer: MEDICARE

## 2022-05-11 VITALS
DIASTOLIC BLOOD PRESSURE: 72 MMHG | SYSTOLIC BLOOD PRESSURE: 126 MMHG | HEART RATE: 89 BPM | RESPIRATION RATE: 18 BRPM | TEMPERATURE: 97.9 F | OXYGEN SATURATION: 99 %

## 2022-05-11 PROCEDURE — 64615 CHEMODENERV MUSC MIGRAINE: CPT

## 2022-05-11 PROCEDURE — 20553 NJX 1/MLT TRIGGER POINTS 3/>: CPT | Performed by: NURSE PRACTITIONER

## 2022-05-11 PROCEDURE — 2580000003 HC RX 258

## 2022-05-11 PROCEDURE — 20553 NJX 1/MLT TRIGGER POINTS 3/>: CPT

## 2022-05-11 PROCEDURE — 2500000003 HC RX 250 WO HCPCS

## 2022-05-11 PROCEDURE — 64615 CHEMODENERV MUSC MIGRAINE: CPT | Performed by: NURSE PRACTITIONER

## 2022-05-11 PROCEDURE — 6360000002 HC RX W HCPCS

## 2022-05-11 PROCEDURE — A4216 STERILE WATER/SALINE, 10 ML: HCPCS

## 2022-05-11 RX ORDER — LIDOCAINE HYDROCHLORIDE 10 MG/ML
15 INJECTION, SOLUTION EPIDURAL; INFILTRATION; INTRACAUDAL; PERINEURAL ONCE
Status: DISCONTINUED | OUTPATIENT
Start: 2022-05-11 | End: 2022-05-13 | Stop reason: HOSPADM

## 2022-05-11 RX ORDER — BUPIVACAINE HYDROCHLORIDE 5 MG/ML
15 INJECTION, SOLUTION EPIDURAL; INTRACAUDAL ONCE
Status: DISCONTINUED | OUTPATIENT
Start: 2022-05-11 | End: 2022-05-13 | Stop reason: HOSPADM

## 2022-05-11 RX ORDER — SODIUM CHLORIDE 9 MG/ML
4.2 INJECTION INTRAVENOUS ONCE
Status: DISCONTINUED | OUTPATIENT
Start: 2022-05-11 | End: 2022-05-13 | Stop reason: HOSPADM

## 2022-05-11 NOTE — PROGRESS NOTES
Patient states that he/she has __12____ headaches out of 30 days each month.   Patient states that he/she has ___2___ migraines out of 30 days each month.monthly

## 2022-05-13 NOTE — PROCEDURES
[address]    Patient Name: Margarita Chiu    : 1975                    Age: 49 y.o. Sex: female    Date: May 12, 2022    Pre-op Diagnosis: Chronic Migraines    Post-op Diagnosis: Chronic Migraines    Procedure: Botox injections x 155 units (45 units wasted)    Performing Procedure: AMNA Lao    No data found. Previous Injections:  Patient had 12/2  headaches/migraines over the past month. Indications:    Margarita Chiu has been treated for problems with chronic migraine headaches. The patient was taken through a conservative course of treatment without complete resolution of symptoms. Botox injection therapy was offered and after the risks and benefits of the procedure were explained to the patient, we had agreed to proceed. The patient was taken to the procedure room and placed in the seated position. The following muscles were identified using palpation and standard landmarks. These muscles were marked and prepped with alcohol. A total of 155 units were injected after careful aspiration and the following distribution bilaterally:  10 units in 2 sites, procerus 5 units in one site, frontalis 20 units in 4 sites, temporalis 40 unit in 8 sites, occipitals 30 units in 6 sites, cervical paraspinals 20 units in 4 sites, and trapezius 30 units in 6 sites. Discharge: The patient tolerated the procedure well. There were no complications during the procedure and the patient was discharged home with discharge instructions. The patient has been instructed to contact the office should there be any complications or questions to arise between today and their next appointment. Plan:    Patient to follow up in the office in about 6 weeks. AMNA Zhong - CNP, 2022 at 7:25 PM       [facility] Physical & Pain Medicine    Patient Name: Margarita Chiu    : 1975                    Age: 49 y.o.     Sex: female    Date: May 12, 2022    Pre-op Diagnosis: Myofascial Pain/ Muscle Spasms/ Cervicalgia    Post-op Diagnosis: Myofascial Pain/ Muscle Spasms/ Cervicalgia    Procedure: Cervical Trigger Point Injections    Performing Procedure: SANDRO Lao    No data found. Description of Procedure:    After a brief physical assessment and failure to improve with conservative measures the patient presented for Cervical Trigger Point Injections The indications, limitations and possible complications were discussed with the patient and the patient elected to proceed with the procedure. After voluntary, informed and signed consent obtained the patient was placed in a seated position. Appropriate time out was obtained per policy. The area of maximal tenderness was palpated over the  bilaterally Cervical muscles - Splenius  Trapezius  Rhomboid The skin overlying these areas was marked with a skin marker. The skin overlying the proposed injection sites were then sprayed with Gebauer's Solution while protecting patient eyes. The areas were prepped using aseptic technique with CHG prep. Each trigger point of the Cervical muscles - Splenius  Trapezius  Rhomboid was injected with approximately 1- 2 ml of a solution of 5 ml of 1% Lidocaine Plain and 5 ml of 0.5% Marcaine Plain after negative aspiration    Pre-op Diagnosis: Myofascial Pain/ Muscle Spasms    Post-op Diagnosis: Myofascial Pain/ Muscle Spasms    Procedure: Thoracic Trigger Point Injections     Performing Procedure: SANDRO Lao    No data found. Description of Procedure:    After a brief physical assessment and failure to improve with conservative measures the patient presented for Thoracic Trigger Point Injections The indications, limitations and possible complications were discussed with the patient and the patient elected to proceed with the procedure.     After voluntary, informed and signed consent obtained the patient was placed in a seated position. Appropriate time out was obtained per policy. The areas were then prepped in a sterile fashion with Chloro-Prep. The area of maximal tenderness was palpated over the bilaterally Thoracic Muscles - Erector Spinae, Upper/Mid Latissimus, Rhomboid Minor, Rhomboid Major. The skin overlying these areas was marked with a skin marker. The areas were prepped using aseptic technique with CHG prep. The skin overlying the proposed injection sites were then sprayed with Gebauer's Solution while protecting patient eyes. Each trigger point of the Thoracic Muscles - Erector Spinae, Upper/Mid Latissimus, Rhomboid Minor, Rhomboid Major was injected after negative aspiration was injected with approximately 1-2 ml of a solution of 5 ml of 1% Lidocaine Plain and 5 ml of 0.5% Marcaine Plain after negative aspiration    Discharge: The patient tolerated the procedure well. There were no complications during the procedure and the patient was discharged home with discharge instructions. The patient has been instructed to contact the office should there be any complications or questions to arise between today and their next appointment. Plan:  Patient to follow up as office appointment in 6 weeks.      AMNA Powell - CNP, 5/12/2022 at 7:27 PM

## 2022-05-17 ENCOUNTER — TELEPHONE (OUTPATIENT)
Dept: PAIN MANAGEMENT | Age: 47
End: 2022-05-17

## 2022-05-17 DIAGNOSIS — M79.18 MYOFASCIAL PAIN: ICD-10-CM

## 2022-06-01 ENCOUNTER — HOSPITAL ENCOUNTER (OUTPATIENT)
Dept: PAIN MANAGEMENT | Age: 47
Discharge: HOME | End: 2022-06-01
Payer: MEDICARE

## 2022-06-01 VITALS
HEART RATE: 81 BPM | RESPIRATION RATE: 18 BRPM | SYSTOLIC BLOOD PRESSURE: 133 MMHG | OXYGEN SATURATION: 100 % | TEMPERATURE: 97.5 F | DIASTOLIC BLOOD PRESSURE: 64 MMHG

## 2022-06-01 DIAGNOSIS — M79.18 MYOFASCIAL PAIN: ICD-10-CM

## 2022-06-01 PROCEDURE — 2500000003 HC RX 250 WO HCPCS

## 2022-06-01 PROCEDURE — 6360000002 HC RX W HCPCS

## 2022-06-01 PROCEDURE — 20553 NJX 1/MLT TRIGGER POINTS 3/>: CPT | Performed by: NURSE PRACTITIONER

## 2022-06-01 PROCEDURE — 20553 NJX 1/MLT TRIGGER POINTS 3/>: CPT

## 2022-06-01 PROCEDURE — 76942 ECHO GUIDE FOR BIOPSY: CPT | Performed by: NURSE PRACTITIONER

## 2022-06-01 PROCEDURE — 20611 DRAIN/INJ JOINT/BURSA W/US: CPT

## 2022-06-01 RX ORDER — BUPIVACAINE HYDROCHLORIDE 5 MG/ML
10 INJECTION, SOLUTION EPIDURAL; INTRACAUDAL ONCE
Status: DISCONTINUED | OUTPATIENT
Start: 2022-06-01 | End: 2022-06-03 | Stop reason: HOSPADM

## 2022-06-01 RX ORDER — LIDOCAINE HYDROCHLORIDE 10 MG/ML
4 INJECTION, SOLUTION EPIDURAL; INFILTRATION; INTRACAUDAL; PERINEURAL ONCE
Status: DISCONTINUED | OUTPATIENT
Start: 2022-06-01 | End: 2022-06-03 | Stop reason: HOSPADM

## 2022-06-01 RX ORDER — TRIAMCINOLONE ACETONIDE 40 MG/ML
80 INJECTION, SUSPENSION INTRA-ARTICULAR; INTRAMUSCULAR ONCE
Status: DISCONTINUED | OUTPATIENT
Start: 2022-06-01 | End: 2022-06-03 | Stop reason: HOSPADM

## 2022-06-01 RX ORDER — LIDOCAINE HYDROCHLORIDE 10 MG/ML
10 INJECTION, SOLUTION EPIDURAL; INFILTRATION; INTRACAUDAL; PERINEURAL ONCE
Status: DISCONTINUED | OUTPATIENT
Start: 2022-06-01 | End: 2022-06-03 | Stop reason: HOSPADM

## 2022-06-01 RX ORDER — BUPIVACAINE HYDROCHLORIDE 5 MG/ML
4 INJECTION, SOLUTION EPIDURAL; INTRACAUDAL ONCE
Status: DISCONTINUED | OUTPATIENT
Start: 2022-06-01 | End: 2022-06-03 | Stop reason: HOSPADM

## 2022-06-01 NOTE — PROCEDURES
[facility] Physical & Pain Medicine    Patient Name: Bridget Pacheco    : 1975    Age: 49 y.o. Sex: female    Date: 2022    Pre-op Diagnosis: bilateral  Sacroiliac Joint(s) Dysfunction/ Sacroiliitis    Post-op Diagnosis: bilateral  Sacroiliac Joint(s) Dysfunction/ Sacroliliits    Procedure: Ultrasound Guided Injection of  bilateral Sacroiliac Joint(s)     Performing Procedure:  SANDRO Glez - [facility]    Patient Vitals for the past 24 hrs:   BP Temp Temp src Pulse Resp SpO2   22 1104 133/64 97.5 °F (36.4 °C) Temporal 81 18 100 %       bilateral  Sacroiliac Joint(s)     Description of Procedure:    After voluntary, informed and signed consent obtained the patient was placed in a prone position. Appropriate time out was obtained per policy. The Sacroiliac Joint(s) was palpated for area of maximal tenderness. The area was prepped in an aseptic fashion with CHG swab. The ultrasound transducer was used to confirm the appropriate location. The skin was sprayed with Gebauer's Solution. Under aseptic technique and direct ultrasound visualization a 22 gauge 3 inch spinal needle was introduced into the Sacroiliac Joint(s). After a negative aspiration, a solution of 2 ml of 0.5% Marcaine Plain and 2 ml of 1% Lidocaine Plain and 1 ml of Kenalog (40 mg/ml) was injected into the Sacroiliac Joint(s). The needle was withdrawn and a sterile dressing applied. If this was a bilateral procedure, the same steps were followed on the opposite side.      Pre-op Diagnosis: Myofascial Pain/ Muscle Spasms    Post-op Diagnosis: Myofascial Pain/ Muscle Spasms    Procedure: Lumbar Trigger Point Injections    Performing Procedure: SANDRO Glez - [facility]    Patient Vitals for the past 24 hrs:   BP Temp Temp src Pulse Resp SpO2   22 1104 133/64 97.5 °F (36.4 °C) Temporal 81 18 100 %       Description of Procedure:    After a brief physical assessment and failure to improve with conservative measures the patient presented for Lumbar Trigger Point Injections The indications, limitations and possible complications were discussed with the patient and the patient elected to proceed with the procedure. After voluntary, informed and signed consent obtained the patient was placed in a seated position. Appropriate time out was obtained per policy. The area of maximal tenderness was palpated over the bilaterally Lumbar Muscles - Lower Latissimus,  Erector Spinae, Lumbar Paraspinous. The skin overlying these areas was marked with a skin marker. The areas were prepped using aseptic technique with CHG prep. The skin overlying the proposed injection sites were then sprayed with Gebauer's Solution while protecting patient eyes. Each trigger point of the Lumbar Muscles - Lower Latissimus,  Erector Spinae, Lumbar Paraspinous was injected after negative aspiration was injected with approximately 1-2 ml of a solution of 5 ml of 1% Lidocaine Plain and 5 ml of 0.5% Marcaine Plain after negative aspiration    Discharge: The patient tolerated the procedure well. There were no complications during the procedure and the patient was discharged home with discharge instructions. The patient has been instructed to contact the office should there be any complications or questions to arise between today and their next appointment. Plan:     Will return to the office in 6 weeks for follow up    AMNA Beverly CNP, 6/1/2022 at 11:42 AM

## 2022-06-07 ENCOUNTER — TELEPHONE (OUTPATIENT)
Dept: PAIN MANAGEMENT | Age: 47
End: 2022-06-07

## 2022-06-07 DIAGNOSIS — M54.50 CHRONIC BILATERAL LOW BACK PAIN WITHOUT SCIATICA: Chronic | ICD-10-CM

## 2022-06-07 DIAGNOSIS — M79.18 MYOFASCIAL PAIN: ICD-10-CM

## 2022-06-07 DIAGNOSIS — G89.29 CHRONIC BILATERAL LOW BACK PAIN WITHOUT SCIATICA: Chronic | ICD-10-CM

## 2022-06-07 NOTE — TELEPHONE ENCOUNTER
Spoke with pt concerning her injections she had on 06/01. Pt states she is a 0/10 today and is 75% better at this time. No further questions at this time.

## 2022-06-08 RX ORDER — HYDROCODONE BITARTRATE AND ACETAMINOPHEN 5; 325 MG/1; MG/1
1 TABLET ORAL 2 TIMES DAILY PRN
Qty: 60 TABLET | Refills: 0 | Status: SHIPPED | OUTPATIENT
Start: 2022-06-08 | End: 2022-07-11 | Stop reason: SDUPTHER

## 2022-07-11 DIAGNOSIS — M54.50 CHRONIC BILATERAL LOW BACK PAIN WITHOUT SCIATICA: Chronic | ICD-10-CM

## 2022-07-11 DIAGNOSIS — G89.29 CHRONIC BILATERAL LOW BACK PAIN WITHOUT SCIATICA: Chronic | ICD-10-CM

## 2022-07-11 RX ORDER — HYDROCODONE BITARTRATE AND ACETAMINOPHEN 5; 325 MG/1; MG/1
1 TABLET ORAL 2 TIMES DAILY PRN
Qty: 60 TABLET | Refills: 0 | Status: SHIPPED | OUTPATIENT
Start: 2022-07-11 | End: 2022-08-09 | Stop reason: SDUPTHER

## 2022-07-14 ENCOUNTER — HOSPITAL ENCOUNTER (OUTPATIENT)
Dept: PAIN MANAGEMENT | Age: 47
Discharge: HOME | End: 2022-07-14
Payer: MEDICARE

## 2022-07-14 VITALS
TEMPERATURE: 98.5 F | WEIGHT: 139 LBS | DIASTOLIC BLOOD PRESSURE: 72 MMHG | HEART RATE: 82 BPM | OXYGEN SATURATION: 98 % | BODY MASS INDEX: 24.63 KG/M2 | HEIGHT: 63 IN | RESPIRATION RATE: 16 BRPM | SYSTOLIC BLOOD PRESSURE: 107 MMHG

## 2022-07-14 DIAGNOSIS — G43.019 INTRACTABLE MIGRAINE WITHOUT AURA AND WITHOUT STATUS MIGRAINOSUS: Primary | Chronic | ICD-10-CM

## 2022-07-14 DIAGNOSIS — M62.838 CERVICAL PARASPINAL MUSCLE SPASM: ICD-10-CM

## 2022-07-14 DIAGNOSIS — M62.830 SPASM OF MUSCLE OF LOWER BACK: ICD-10-CM

## 2022-07-14 PROCEDURE — 99214 OFFICE O/P EST MOD 30 MIN: CPT | Performed by: NURSE PRACTITIONER

## 2022-07-14 PROCEDURE — 99213 OFFICE O/P EST LOW 20 MIN: CPT

## 2022-07-14 RX ORDER — ATOGEPANT 60 MG/1
1 TABLET ORAL DAILY
Qty: 30 TABLET | Refills: 2 | Status: SHIPPED | OUTPATIENT
Start: 2022-07-14 | End: 2022-10-27 | Stop reason: SDUPTHER

## 2022-07-14 RX ORDER — TRAZODONE HYDROCHLORIDE 150 MG/1
150 TABLET ORAL DAILY
COMMUNITY
Start: 2022-07-01

## 2022-07-14 RX ORDER — UBROGEPANT 100 MG/1
1 TABLET ORAL DAILY PRN
Qty: 16 TABLET | Refills: 5 | Status: SHIPPED | OUTPATIENT
Start: 2022-07-14 | End: 2022-09-07 | Stop reason: SDUPTHER

## 2022-07-14 ASSESSMENT — ENCOUNTER SYMPTOMS
VISUAL CHANGE: 1
NAUSEA: 1
CONSTIPATION: 0
PHOTOPHOBIA: 1
SCALP TENDERNESS: 1
BACK PAIN: 1
BLURRED VISION: 1
VOMITING: 1

## 2022-07-14 ASSESSMENT — PAIN DESCRIPTION - PAIN TYPE
TYPE_2: CHRONIC PAIN
TYPE: CHRONIC PAIN

## 2022-07-14 ASSESSMENT — PAIN DESCRIPTION - LOCATION
LOCATION: BACK
LOCATION_3: HEAD
LOCATION_2: NECK

## 2022-07-14 ASSESSMENT — PAIN DESCRIPTION - ORIENTATION
ORIENTATION: LOWER
ORIENTATION_2: LOWER

## 2022-07-14 ASSESSMENT — PAIN SCALES - GENERAL: PAINLEVEL_OUTOF10: 3

## 2022-07-14 ASSESSMENT — PAIN DESCRIPTION - INTENSITY
RATING_3: 6
RATING_2: 3

## 2022-07-14 NOTE — PROGRESS NOTES
Clinic Documentation      Education Provided:  [x] Review of Teresa Morfin  [] Agreement Review  [x] PEG Score Calculated [] PHQ Score Calculated [] ORT Score Calculated    [] Compliance Issues Discussed [] Cognitive Behavior Needs [x] Exercise [] Review of Test [] Financial Issues  [x] Tobacco/Alcohol Use Reviewed [x] Teaching [] New Patient [] Picture Obtained    Physician Plan:  [] Outgoing Referral  [] Pharmacy Consult  [] Test Ordered [x] Prescription Ordered/Changed   [] Obtained Test Results / Consult Notes        Complete if patient is withholding blood thinner for procedure     Blood Thinner Patient is currently taking:      [] Plavix (Hold for 7 days)  [] Aspirin (Hold for 5 days)     [] Pletal (Hold for 2 days)  [] Pradaxa (Hold for 3 days)    [] Effient (Hold for 7 days)  [] Xarelto (Hold for 2 days)    [] Eliquis (Hold for 2 days)  [] Brilinta (Hold for 7 days)    [] Coumadin (Hold for 5 days) - (INR needs to be drawn the day prior to procedure- INR < 2.0)    [] Aggrenox (Hold for 7 days)        [] Patient will stop medication on their own.    [] Blood Thinner Form Faxed for approval to hold.    Provider form faxed to:     Assessment Completed by:  Electronically signed by Francie Cheung RN on 7/14/2022 at 4:04 PM

## 2022-07-14 NOTE — PROGRESS NOTES
Located within [facility] Physical & Pain Medicine    Office Visit    Patient Name: Christopher Juarez    MR #: [id number]    Account [de-identified]    : 1975    Age: 49 y.o. Sex: female    Date: 2028    PCP: Trent Claros MD    Chief Complaint:   Chief Complaint   Patient presents with    Back Pain    Neck Pain    Migraine       History of Present Illness: The patient is a 49 y.o. female who is for procedure follow-up. Patient had Botox for migraines on 2022. Patient had over 100 hours of migraine freedom per month with a decrease in severity, in intensity and in aggravating symptoms compared to pre-treatment from Botox. Had bilateral cervical and thoracic trigger points on 2022. Patient had at least 85% relief of pain from procedure(s) for at least 6 weeks and was able to increase activity after procedure. Patient received enough pain relief from injections that the patient would like to repeat the injection(s). Bilateral lumbar trigger points with bilateral SI injections on 2022. Patient had at least 85% relief of pain from procedure(s) for at least 6 weeks and was able to increase activity after procedure. Patient received enough pain relief from injections that the patient would like to repeat the injection(s). States having her cervical trigger points on the same day as her Botox for migraines does help treat her headaches better. At last appointment patient was started on Nurtec for abortive therapy as well as preventative therapy with her migraines. Patient states that taking the medication every other day has helped lessen the migraines but she feels she would be better to take it every day. Explained to patient that with medication half-life it cannot be taken on a daily basis. Back Pain  This is a chronic problem. The current episode started more than 1 year ago. The problem occurs intermittently. The problem has been waxing and waning since onset.  The pain is present in the lumbar spine and sacro-iliac. The quality of the pain is described as cramping and aching. The symptoms are aggravated by bending, sitting and standing. Associated symptoms include headaches. Neck Pain   This is a chronic problem. The current episode started more than 1 year ago. The problem occurs constantly. The problem has been waxing and waning. The quality of the pain is described as aching and cramping. Associated symptoms include headaches, photophobia and a visual change. Migraine  Associated symptoms include arthralgias, headaches, myalgias, nausea, neck pain, a visual change and vomiting. Headache   This is a chronic problem. The current episode started more than 1 year ago. The problem occurs daily. The problem has been waxing and waning (recently patient is having more migraines and she has one at this appointment). The pain is located in the Bilateral, frontal and occipital region. The quality of the pain is described as aching, stabbing, throbbing and squeezing. Associated symptoms include back pain, blurred vision, nausea, neck pain, phonophobia, photophobia, scalp tenderness, a visual change and vomiting. The symptoms are aggravated by bright light, activity, emotional stress and noise. Screening Tools:    PEG Score: 3     Last PEG Score: 2     Annual ORT Score: 2     Annual PHQ Score: 13    Current Pain Assessment  Pain Assessment  Pain Assessment: 0-10  Pain Level: 3  Pain Location: Back  Pain Orientation: Lower  Pain Type: Chronic pain    Past Visit HPI:  4/14/2022  Patient has been having more migraines in the past month or two. She states that she is having at least 3 migraines per month. She cannot identify any specific trigger. She states that the Imitrex does not help at all anymore. She tried Ubrevly and she did have some relief. 3/1/2021  presents for follow up of procedure.  Patient had Lumbar trigger point injections on 11/18/2020, Cervical and thoracic trigger point injections on 1/6/2021, and Botox for Migraines on 1/13/2021. Patient had at least 85% relief of pain from procedure(s) for at least 6 weeks and was able to increase activity after procedure. Patient received enough pain relief from injections that the patient would like to repeat the injection(s). Patient states that she does not have to use abortive migraine medications until the last two to four weeks prior to Botox injections and sometimes the two weeks after receiving injections. Patient will take Fioricet first especially is she is not at home then she will take Imitrex. The Imitrex causes her to be very drowsy. Patient is behind on lumbar injections due to providers scheduled and her low back is bothering her a little more right now. 11/10/2020  presents for procedure follow-up. Patient had bilateral SI joint injections with lumbar trigger points on 7/1/2020, patient had cervical and thoracic trigger points on 7/15/2020, and patient had Botox injections on 9/2/2020. Patient had at least 85% relief of pain from procedure(s) for at least 6 weeks and was able to increase activity after procedure. Patient received enough pain relief from injections that the patient would like to repeat the injection(s).    6/22/2020  presents to the office for annual exam with primary complaints of chronic headaches and back pain. Patient was referred to this office last April 2018 with a past history of chiari malformation. Patient had a posterior fossa decompression and her pain had pretty much resolved for approximately 3 years. However during the year prior to coming back to this office patient started developing pain again. She started having chronic headaches, along with myofascial pain in the shoulders and thoracic spine.  Through the years patient has been on numerous medications to treat headaches including the following but not limited to Topamax, amitriptyline, propranolol, Imitrex, gabapentin, and Howard Gerardo does help with the intense headaches but he does not make headaches tolerable. She does follow with neurology and she has been started on Aimovig which helps. Patient had been getting bilateral occipital nerve blocks along with trigger point injections in her cervical, thoracic, and lumbar spine. Patient was changed to botox injections in October 2019. Patient has had excellent results from botox injections. Patient did have a flare up of low back pain. She has had SI injections which have made low back pain improve. Past Medical History  Past Medical History:   Diagnosis Date    ADHD (attention deficit hyperactivity disorder)     Anemia     Anxiety     Cervicalgia     [facility]     s/p decompression 2014    Chronic back pain     Depression     Headache(784.0)     Hiatal hernia     Hyperlipidemia     Indigestion     Migraines     Obstructive sleep apnea     Mild with an AHI:  7.5 and  RDI:  14.7       Medications  Current Outpatient Medications   Medication Sig Dispense Refill    traZODone (DESYREL) 150 MG tablet Take 150 mg by mouth daily      HYDROcodone-acetaminophen (NORCO) 5-325 MG per tablet Take 1 tablet by mouth 2 times daily as needed for Pain for up to 30 days. 60 tablet 0    ALPRAZolam (XANAX) 1 MG tablet Take 1 mg by mouth 2 times daily as needed. lamoTRIgine (LAMICTAL) 200 MG tablet Take 200 mg by mouth nightly      VYVANSE 70 MG capsule Take 70 mg by mouth daily. Rimegepant Sulfate 75 MG TBDP Take 75 mg by mouth every 48 hours as needed (migraines) 30 tablet 5    butalbital-acetaminophen-caffeine (FIORICET, ESGIC) -40 MG per tablet Take 1 tablet by mouth daily as needed for Headaches (may repeat in 4 hours x 1 no more than 2 per day) 30 tablet 2    ibuprofen (ADVIL;MOTRIN) 800 MG tablet Take 1 tablet by mouth 3 times daily as needed for Pain 90 tablet 2    lidocaine (LIDODERM) 5 % Place 3 patches onto the skin daily 12 hours on, 12 hours off. 30 patch 2    FLUoxetine (PROZAC) 20 MG capsule Take 40 mg by mouth daily       zolpidem (AMBIEN) 10 MG tablet Take 1 tablet by mouth nightly. .  2     No current facility-administered medications for this encounter. Allergies  Ultram [tramadol]    Current Medications  Current Outpatient Medications   Medication Sig Dispense Refill    traZODone (DESYREL) 150 MG tablet Take 150 mg by mouth daily      HYDROcodone-acetaminophen (NORCO) 5-325 MG per tablet Take 1 tablet by mouth 2 times daily as needed for Pain for up to 30 days. 60 tablet 0    ALPRAZolam (XANAX) 1 MG tablet Take 1 mg by mouth 2 times daily as needed. lamoTRIgine (LAMICTAL) 200 MG tablet Take 200 mg by mouth nightly      VYVANSE 70 MG capsule Take 70 mg by mouth daily. Rimegepant Sulfate 75 MG TBDP Take 75 mg by mouth every 48 hours as needed (migraines) 30 tablet 5    butalbital-acetaminophen-caffeine (FIORICET, ESGIC) -40 MG per tablet Take 1 tablet by mouth daily as needed for Headaches (may repeat in 4 hours x 1 no more than 2 per day) 30 tablet 2    ibuprofen (ADVIL;MOTRIN) 800 MG tablet Take 1 tablet by mouth 3 times daily as needed for Pain 90 tablet 2    lidocaine (LIDODERM) 5 % Place 3 patches onto the skin daily 12 hours on, 12 hours off. 30 patch 2    FLUoxetine (PROZAC) 20 MG capsule Take 40 mg by mouth daily       zolpidem (AMBIEN) 10 MG tablet Take 1 tablet by mouth nightly. .  2     No current facility-administered medications for this encounter.        Social History    Social History     Socioeconomic History    Marital status:      Spouse name: None    Number of children: 2    Years of education: None    Highest education level: None   Occupational History    None   Tobacco Use    Smoking status: Never Smoker    Smokeless tobacco: Never Used   Vaping Use    Vaping Use: Never used   Substance and Sexual Activity    Alcohol use: No    Drug use: No    Sexual activity: Yes     Partners: Male   Other Topics Concern    None   Social History Narrative    None     Social Determinants of Health     Financial Resource Strain:     Difficulty of Paying Living Expenses: Not on file   Food Insecurity:     Worried About [address] in the Last Year: Not on file    Vlad of Food in the Last Year: Not on file   Transportation Needs:     Lack of Transportation (Medical): Not on file    Lack of Transportation (Non-Medical): Not on file   Physical Activity:     Days of Exercise per Week: Not on file    Minutes of Exercise per Session: Not on file   Stress:     Feeling of Stress : Not on file   Social Connections:     Frequency of Communication with Friends and Family: Not on file    Frequency of Social Gatherings with Friends and Family: Not on file    Attends Buddhist Services: Not on file    Active Member of Clubs or Organizations: Not on file    Attends Club or Organization Meetings: Not on file    Marital Status: Not on file   Intimate Partner Violence:     Fear of Current or Ex-Partner: Not on file    Emotionally Abused: Not on file    Physically Abused: Not on file    Sexually Abused: Not on file   Housing Stability:     Unable to Pay for Housing in the Last Year: Not on file    Number of Places Lived in the Last Year: Not on file    Unstable Housing in the Last Year: Not on file         Family History  family history includes Cancer in her maternal aunt and another family member; High Blood Pressure in her father and sister. Review of Systems:  Review of Systems   Constitutional:  Positive for activity change. Eyes:  Positive for blurred vision, photophobia and visual disturbance. Gastrointestinal:  Positive for nausea and vomiting. Negative for constipation. Musculoskeletal:  Positive for arthralgias, back pain, myalgias, neck pain and neck stiffness. Neurological:  Positive for headaches. Psychiatric/Behavioral:  Positive for sleep disturbance. Negative for agitation, self-injury and suicidal ideas. The patient is not nervous/anxious. 14 point ROS negative besides that noted in HPI    Physical exam:     Patient Vitals for the past 24 hrs:   BP Temp Temp src Pulse Resp SpO2 Height Weight   07/14/22 1540 107/72 98.5 °F (36.9 °C) Temporal 82 16 98 % 5' 3\" (1.6 m) 139 lb (63 kg)       Body mass index is 24.62 kg/m². Physical Exam  Vitals and nursing note reviewed. Constitutional:       General: She is not in acute distress. Appearance: She is well-developed. HENT:      Head: Normocephalic. Right Ear: External ear normal.      Left Ear: External ear normal.      Nose: Nose normal.   Eyes:      Conjunctiva/sclera: Conjunctivae normal.      Pupils: Pupils are equal, round, and reactive to light. Cardiovascular:      Rate and Rhythm: Normal rate and regular rhythm. Pulmonary:      Effort: Pulmonary effort is normal.      Breath sounds: Normal breath sounds. Abdominal:      General: Bowel sounds are normal.      Palpations: Abdomen is soft. Musculoskeletal:      Cervical back: Normal range of motion and neck supple. Spasms (Noted to have tender palpable knots noted in muscles ) and tenderness present. Pain with movement present. Thoracic back: Spasms (Noted to have tender palpable knots noted in muscles ) and tenderness present. Lumbar back: Spasms (Noted to have tender palpable knots noted in muscles ) and tenderness (Positive Bilateral  SI tenderness noted. 3 + provacative test) present. Skin:     General: Skin is warm and dry. Neurological:      Mental Status: She is alert and oriented to person, place, and time. Cranial Nerves: No cranial nerve deficit. Sensory: No sensory deficit. Deep Tendon Reflexes: Reflexes are normal and symmetric. Psychiatric:         Behavior: Behavior normal.         Thought Content:  Thought content normal.         Judgment: Judgment normal.       LABS:     Lab Results   Component Value Date/Time     08/27/2011 05:15 PM    K 4.0 08/27/2011 05:15 PM     08/27/2011 05:15 PM    CO2 32 08/27/2011 05:15 PM    BUN 10 08/27/2011 05:15 PM    CREATININE 0.8 08/27/2011 05:15 PM    GLUCOSE 94 08/27/2011 05:15 PM    CALCIUM 9.3 08/27/2011 05:15 PM        Lab Results   Component Value Date    WBC 6.87 08/27/2011    HGB 12.6 08/27/2011    HCT 38.0 08/27/2011    MCV 91.1 08/27/2011     08/27/2011       Assessment:                                                                                                                                        Active Problems:    Restless leg syndrome    Chiari malformation type I (HCC)    Intractable migraine without aura and without status migrainosus    Myofascial pain    Bilateral occipital neuralgia    SI (sacroiliac) joint dysfunction    Chronic bilateral low back pain without sciatica    Pain management contract agreement  Resolved Problems:    * No resolved hospital problems. *      PLAN:  Medications:   Intractable migraine without aura and without status migrainosus    Continue medication with no refill sent at appointment see refill encounter. butalbital-acetaminophen-caffeine (FIORICET, ESGIC) -40 MG per tablet; Take 1 tablet by mouth daily as needed for Headaches (may repeat in 4 hours x 1 no more than 2 per day)  Dispense: 30 tablet; Refill: 2    - Atogepant (QULIPTA) 60 MG TABS; Take 1 tablet by mouth daily  Dispense: 30 tablet; Refill: 2  - Ubrogepant (UBRELVY) 100 MG TABS; Take 1 tablet by mouth daily as needed (Migraine may repeat x 1 within 4 hours) Take 1 tablet at the start of a migraine. May repeat x1 within 4 hours. Do not exceed 9 days per month  Dispense: 16 tablet; Refill: 5     Stop Nurtec and start [place]. Patient can take daily preventative medication and use abortive medication coupled with Botox patient should to complete migraine freedom.   Had used Ubrelvy in the past which had helped tried to use Nurtec as preventative and abortive but patient needed better combination. Patient has failed triptans in the past.    Chronic bilateral low back pain without sciatica  Continue medication with no refill sent at appointment see refill encounter dater 7/11/2022  HYDROcodone-acetaminophen (NORCO) 5-325 MG per tablet; Take 1 tablet by mouth every 8 hours as needed for Pain for up to 30 days. Take lowest dose possible to manage pain  Dispense: 60 tablet; Refill: 0    Continue medication with no refill sent at appointment see refill encounter. Ibuprofen (Advil) 800 mg TID prn # 90 with 2 RF's    Continue medication with no refill sent at appointment see refill encounter. Lidocaine (lidoderm) 5% 1-3 patches transdermal daily # 30 2 RF's    Treatments:   Continue Botox for migraine. Patient obtained 50% reduction in mean headache days per month with a decrease in severity, intensity, and aggravating symptoms as compared with pretreatment and patient had over 100 hours of migraine freedom per month after Botox    Continue with cervical and thoracic trigger point injections for myofascial pain as patient has had good response in the past. These work best if given same day as botox. Continue with lumbar trigger point injections for myofacial pain as patient has had a good response to these injections in the past. These work best if given same day as SI joint injections. Continue with bilateral SI joint injections with ultrasound for SI joint dysfunction and chronic low back pain as patient has had good response with these before. Order placed for NexWave 3 in 1 device. Patient suffers from SI joint dysfunction. Chronic low back pain, muscle spasms of neck and low back. [employer] NexWave is a prescription only 3-in-1 device with IFC, TENS, and NMES. The device is used to help manage their pain symptoms, re-educate and strengthen muscles, and or hopefully reduce or eliminate their need for pain medications.      IFC can help extend pain relief and hopefully reduce need for pain medication. TENS is used to control break through pain. NMES is used to help with muscle spasms and strengthen weak muscles. Electrotherapy is effective and can increase activity levels by over 60% and has shown, in some patients, a reduction of pain medication usage by 50% (1). Vanesa DA, Apryl C, Nia A, Ary Yu (1996) Transcutaneous Electric Nerve Stimulation Treatment Outcome in Long-Term Users. Clinical Journal of Pain, 12(3), 1996, p.201      [x] Follow up    [] 4 weeks   [x] 6-8 weeks   [] 10-12 weeks   [] 3 months  [x] Post procedure to evaluate effectiveness of treatment  [] To evaluate medications changes made at office visit. [] To review diagnostics ordered at last visit. [] To evaluate response to therapy    [x] For management of controlled substance  [x] Random UDS as indicated by ORT score or if indicated by abberent behaviors    Discussion: Discussed exam findings and plan of care with patient. Patient agreed with POC and questions were asked and answered. Activity: discussed exercise as beneficial to pain reduction, encouraged stretching exercise with a focus on torso strengthening. Goals:  Pain Management Goals of Therapy:   [] Resolution in pain  [x] Decrease in pain level  [x] Improvement in ADL's  [x] Increase in activities with less pain  [] Decrease in medication      Controlled substance monitoring:    [x] Discussed medication side effects, risk of tolerance and/or dependence, and/or alternative treatment  [] Discussed the detrimental effects of long term narcotic use in younger patients especially women of childbearing years.   [x] No signs and symptoms of potential drug abuse or diversion were identified  [] Signs of potential drug abuse or diversion were identified   [] ORT Score   [] UDS non-compliant   [] See Note  [] Random urine drug screen sent today   [x] Random urine drug screen not completed today   [] Deferred New Patient  [x] Compliant 4/2022 saliva  [] Not Compliant see note  [] Medication agreement with provider signed today 4/2022  [x] Medication agreement with provider on file under media   [] Medication regimen effective with c/o of side effects noted and continued   [] New patient continuing current medication while developing POC   [x] On going assessment and evaluation of medication regimen  [] Medication regimen not effective see plan for changes  [x] Vladislav Nguyễn reviewed & on file under media     CC: Larraine Beckmann, MD Marylen Reap, APRN - CNP, 7/14/2022 at 3:45 PM    EMR dragon/transcription disclaimer: Much of this encounter note is electronic transcription/translation of spoken language to printed tach. Electronic translation of spoken language may be erroneous, or at times, nonsensical words or phrases may be inadvertently transcribed.  Although, I have reviewed the note for such errors, some may still exist.

## 2022-08-03 ENCOUNTER — TELEPHONE (OUTPATIENT)
Dept: PAIN MANAGEMENT | Age: 47
End: 2022-08-03

## 2022-08-03 ENCOUNTER — HOSPITAL ENCOUNTER (OUTPATIENT)
Dept: PAIN MANAGEMENT | Age: 47
Discharge: HOME | End: 2022-08-03
Payer: MEDICARE

## 2022-08-03 VITALS
RESPIRATION RATE: 16 BRPM | HEART RATE: 84 BPM | SYSTOLIC BLOOD PRESSURE: 115 MMHG | DIASTOLIC BLOOD PRESSURE: 71 MMHG | TEMPERATURE: 98.1 F | OXYGEN SATURATION: 100 %

## 2022-08-03 DIAGNOSIS — M79.18 MYOFASCIAL PAIN: Primary | ICD-10-CM

## 2022-08-03 PROCEDURE — 2500000003 HC RX 250 WO HCPCS

## 2022-08-03 PROCEDURE — 64615 CHEMODENERV MUSC MIGRAINE: CPT

## 2022-08-03 PROCEDURE — A4216 STERILE WATER/SALINE, 10 ML: HCPCS

## 2022-08-03 PROCEDURE — 20553 NJX 1/MLT TRIGGER POINTS 3/>: CPT | Performed by: NURSE PRACTITIONER

## 2022-08-03 PROCEDURE — 6360000002 HC RX W HCPCS

## 2022-08-03 PROCEDURE — 2580000003 HC RX 258

## 2022-08-03 PROCEDURE — 20553 NJX 1/MLT TRIGGER POINTS 3/>: CPT

## 2022-08-03 PROCEDURE — 64615 CHEMODENERV MUSC MIGRAINE: CPT | Performed by: NURSE PRACTITIONER

## 2022-08-03 RX ORDER — BUPIVACAINE HYDROCHLORIDE 5 MG/ML
15 INJECTION, SOLUTION EPIDURAL; INTRACAUDAL ONCE
Status: DISCONTINUED | OUTPATIENT
Start: 2022-08-03 | End: 2022-08-05 | Stop reason: HOSPADM

## 2022-08-03 RX ORDER — SODIUM CHLORIDE 9 MG/ML
4.2 INJECTION INTRAVENOUS ONCE
Status: DISCONTINUED | OUTPATIENT
Start: 2022-08-03 | End: 2022-08-05 | Stop reason: HOSPADM

## 2022-08-03 RX ORDER — LIDOCAINE HYDROCHLORIDE 10 MG/ML
15 INJECTION, SOLUTION EPIDURAL; INFILTRATION; INTRACAUDAL; PERINEURAL ONCE
Status: DISCONTINUED | OUTPATIENT
Start: 2022-08-03 | End: 2022-08-05 | Stop reason: HOSPADM

## 2022-08-03 NOTE — DISCHARGE INSTRUCTIONS
[facility] Physical And Pain Medicine  Post Procedure Discharge Instructions        YOU HAVE HAD THE FOLLOWING PROCEDURE:                                  [] Occipital Nerve Blocks  [] CTS wrist injection(s)  [] Knee Injection(s)         [] Shoulder Injection(s)   [] Elbow Injection(s)     [x] Botox Injection  [x] Cervical Trigger Point Injections    [x] Thoracic Trigger Point Injections    [] Lumbar Trigger Point Injections  [] Piriformis Trigger Point Injections  [] SI Joint Injection(s)     [] Trochanteric Bursa Injection(s)       [] Ankle Injection(s)   [] Plantar Fasciitis   []  ______________  Injection(s) [x] Botox [x]  Migraines [] Spasticity    YOU HAVE RECEIVED THE FOLLOWING MEDICATIONS IN YOUR INJECTION(s)  [x] Lidocaine [x] Bupivacaine   [] DepoMedrol (steroid) [] Decadron (steroid)  []  Kenalog (steroid)   [] Toradol  [] Supartz [] Gosia Heads    [x] Botox        PATIENT INFORMATION:   You may experience the following symptoms after your procedure. These symptoms are normal and should not cause concern: You may have an increase in your pain. This may last 24 - 48 hours after your procedure. You may have no change in the pain that you had prior to your injection(s). You may have weakness or numbness in your affected extremity. If this occurs, this may last until numbing the medication wears off. REPORT THE FOLLOWING SYMPTOMS TO YOUR DOCTOR:  Redness, swelling or drainage at the injection site(s)  Unusual pain that interferes with your normal activities of daily living. OTHER INSTRUCTIONS:    [x] I will apply ice to the injection site(s) for at least 24 hours after the procedure. I will rotate the ice on for 20 minutes and off for 20 minutes for at least 24 hours. [x] I will not apply heat for at least 48 hours and I will not take a hot bath or shower for at least 24 hours.      [x] I understand that if Lidocaine or Bupivacaine was used in my injection(s) that the injection site(s) will be numb for a few hours after the procedure    [] I understand if a steroid was used it will take effect in 3 - 7 days. I understand that as the numbing medication wears off, the pain may return until the steroids start working. [x] I understand that today I will rest for the next 24 hours and drink plenty of water. [x] For Botox for Migraines please do not wear anything constricting around the forehead for 7-10 days post injection. Examples headband, hats, or bandana    [] For Botox for Spasticity start therapy for the affected limb in two weeks. [] Additional instructions: ______________________________________________ ___________________________________________________________________    Sedation:  Was oral sedation given? [] Yes  [x] No    I understand that if I took an oral nerve calming medication I will not drive for  [] 24 hours after taking the medication.     [x] I have received a copy of my discharge instructions and understand the above instructions to the best of my knowledge    Patient Discharged:  [x] Home  [] Hospital  [] Other  ____________________________________________    Via:  [x] Ambulatory  [] Wheelchair   [] Stretcher [] EMS       Accompanied By:   [] Significant other  [] Family Member  [] Friend   [] Hospital Staff  []  Ambulance Staff  [x] Other_______________________________________________    Plan:  [] Will return to the office in   [] 1 month   [] 3 months for:  [] Procedure Follow-up  [x] Office Visit   [x] Planned Procedure      Patient Signature: _____________________________________________________    Staff Signature: _______________________________________________________        If you have questions, problems or concerns you may call [phone] and follow the prompts    SANDRO Curry, [facility]-BC

## 2022-08-03 NOTE — PROCEDURES
conservative measures the patient presented for Cervical Trigger Point Injections The indications, limitations and possible complications were discussed with the patient and the patient elected to proceed with the procedure. After voluntary, informed and signed consent obtained the patient was placed in a seated position. Appropriate time out was obtained per policy. The area of maximal tenderness was palpated over the  bilaterally Cervical muscles - Splenius  Trapezius  Rhomboid The skin overlying these areas was marked with a skin marker. The skin overlying the proposed injection sites were then sprayed with Gebauer's Solution while protecting patient eyes. The areas were prepped using aseptic technique with CHG prep. Each trigger point of the Cervical muscles - Splenius  Trapezius  Rhomboid was injected with approximately 1- 2 ml of a solution of 5 ml of 1% Lidocaine Plain and 5 ml of 0.5% Marcaine Plain after negative aspiration    Pre-op Diagnosis: Myofascial Pain/ Muscle Spasms    Post-op Diagnosis: Myofascial Pain/ Muscle Spasms    Procedure: Thoracic Trigger Point Injections     Performing Procedure: SANDRO Schneider - [facility]    Patient Vitals for the past 24 hrs:   BP Temp Temp src Pulse Resp SpO2   08/03/22 1008 115/71 98.1 °F (36.7 °C) Temporal 84 16 100 %       Description of Procedure:    After a brief physical assessment and failure to improve with conservative measures the patient presented for Thoracic Trigger Point Injections The indications, limitations and possible complications were discussed with the patient and the patient elected to proceed with the procedure. After voluntary, informed and signed consent obtained the patient was placed in a seated position. Appropriate time out was obtained per policy. The areas were then prepped in a sterile fashion with Chloro-Prep.  The area of maximal tenderness was palpated over the bilaterally Thoracic Muscles - Erector Spinae, Upper/Mid Latissimus, Rhomboid Minor, Rhomboid Major. The skin overlying these areas was marked with a skin marker. The areas were prepped using aseptic technique with CHG prep. The skin overlying the proposed injection sites were then sprayed with Gebauer's Solution while protecting patient eyes. Each trigger point of the Thoracic Muscles - Erector Spinae, Upper/Mid Latissimus, Rhomboid Minor, Rhomboid Major was injected after negative aspiration was injected with approximately 1-2 ml of a solution of 5 ml of 1% Lidocaine Plain and 5 ml of 0.5% Marcaine Plain after negative aspiration    Discharge: The patient tolerated the procedure well. There were no complications during the procedure and the patient was discharged home with discharge instructions. The patient has been instructed to contact the office should there be any complications or questions to arise between today and their next appointment. Plan:  Patient to follow up as office appointment in 6 weeks.      AMNA Simmons - CNP, 8/3/2022 at 10:58 AM

## 2022-08-03 NOTE — PROGRESS NOTES
Procedure:  Level of Consciousness: [x]Alert [x]Oriented []Disoriented []Lethargic  Anxiety Level: [x]Calm []Anxious []Depressed []Other  Skin: []Warm [x]Dry []Cool []Moist []Intact []Other  Cardiovascular: [x]Palpitations: [x]Never []Occasionally []Frequently  Chest Pain: [x]No []Yes  Respiratory:  [x]Unlabored []Labored []Cough ([] Productive []Unproductive)  HCG Required: [x]No []Yes   Results: []Negative []Positive  Knowledge Level:        [x]Patient/Other verbalized understanding of pre-procedure instructions. [x]Assessment of post-op care needs (transportation, responsible caregiver)        [x]Able to discuss health care problems and how to deal with it. Factors that Affect Teaching:        Language Barrier: [x]No []Yes - why:        Hearing Loss:        [x]No []Yes            Corrective Device:  []Yes []No        Vision Loss:           []No [x]Yes            Corrective Device:  [x]Yes []No        Memory Loss:       [x]No []Yes            []Short Term []Long Term  Motivational Level:  [x]Asks Questions                  []Extremely Anxious       [x]Seems Interested               []Seems Uninterested                  [x]Denies need for Education  Risk for Injury:  [x]Patient oriented to person, place and time  []History of frequent falls/loss of balance  Nutritional:  []Change in appetite   []Weight Gain   []Weight Loss  Functional:  []Requires assistance with ADL's      Botox Assessment  [] Patient  has had a reduction of at least 100 hours (5 Days) in Migraines since receiving Botox  []  []  []  Factors that Affect Teaching:  Assessment of post-op care needs (transportation, responsible caregiver)        []Able to discuss health care problems and how to deal with it. Factors that Affect Teaching: Patient states that he/she has _3_____ headaches out of 30 days each month.   Patient states that he/she has ___1___ migraines out of 30 days each month.monthly

## 2022-08-08 DIAGNOSIS — G89.29 CHRONIC BILATERAL LOW BACK PAIN WITHOUT SCIATICA: Chronic | ICD-10-CM

## 2022-08-08 DIAGNOSIS — M54.50 CHRONIC BILATERAL LOW BACK PAIN WITHOUT SCIATICA: Chronic | ICD-10-CM

## 2022-08-09 ENCOUNTER — TELEPHONE (OUTPATIENT)
Dept: PAIN MANAGEMENT | Age: 47
End: 2022-08-09

## 2022-08-09 DIAGNOSIS — M79.18 MYOFASCIAL PAIN: Primary | ICD-10-CM

## 2022-08-09 RX ORDER — HYDROCODONE BITARTRATE AND ACETAMINOPHEN 5; 325 MG/1; MG/1
1 TABLET ORAL 2 TIMES DAILY PRN
Qty: 60 TABLET | Refills: 0 | Status: SHIPPED | OUTPATIENT
Start: 2022-08-11 | End: 2022-09-07 | Stop reason: SDUPTHER

## 2022-09-06 DIAGNOSIS — G43.019 INTRACTABLE MIGRAINE WITHOUT AURA AND WITHOUT STATUS MIGRAINOSUS: Chronic | ICD-10-CM

## 2022-09-06 DIAGNOSIS — M54.50 CHRONIC BILATERAL LOW BACK PAIN WITHOUT SCIATICA: Chronic | ICD-10-CM

## 2022-09-06 DIAGNOSIS — Z86.19 HISTORY OF SHINGLES: ICD-10-CM

## 2022-09-06 DIAGNOSIS — G89.29 CHRONIC BILATERAL LOW BACK PAIN WITHOUT SCIATICA: Chronic | ICD-10-CM

## 2022-09-07 RX ORDER — LIDOCAINE 50 MG/G
3 PATCH TOPICAL DAILY
Qty: 30 PATCH | Refills: 2 | Status: SHIPPED | OUTPATIENT
Start: 2022-09-07

## 2022-09-07 RX ORDER — HYDROCODONE BITARTRATE AND ACETAMINOPHEN 5; 325 MG/1; MG/1
1 TABLET ORAL 2 TIMES DAILY PRN
Qty: 60 TABLET | Refills: 0 | Status: SHIPPED | OUTPATIENT
Start: 2022-09-10 | End: 2022-10-06 | Stop reason: SDUPTHER

## 2022-09-07 RX ORDER — BUTALBITAL, ACETAMINOPHEN AND CAFFEINE 50; 325; 40 MG/1; MG/1; MG/1
1 TABLET ORAL DAILY PRN
Qty: 30 TABLET | Refills: 2 | Status: SHIPPED | OUTPATIENT
Start: 2022-09-07

## 2022-09-07 RX ORDER — UBROGEPANT 100 MG/1
1 TABLET ORAL DAILY PRN
Qty: 16 TABLET | Refills: 5 | Status: SHIPPED | OUTPATIENT
Start: 2022-09-07

## 2022-09-14 ENCOUNTER — HOSPITAL ENCOUNTER (OUTPATIENT)
Dept: PAIN MANAGEMENT | Age: 47
Discharge: HOME | End: 2022-09-14
Payer: MEDICARE

## 2022-09-14 PROCEDURE — 20553 NJX 1/MLT TRIGGER POINTS 3/>: CPT | Performed by: NURSE PRACTITIONER

## 2022-09-14 PROCEDURE — 20611 DRAIN/INJ JOINT/BURSA W/US: CPT

## 2022-09-14 PROCEDURE — 2500000003 HC RX 250 WO HCPCS

## 2022-09-14 PROCEDURE — 20553 NJX 1/MLT TRIGGER POINTS 3/>: CPT

## 2022-09-14 PROCEDURE — 6360000002 HC RX W HCPCS

## 2022-09-14 PROCEDURE — 76942 ECHO GUIDE FOR BIOPSY: CPT | Performed by: NURSE PRACTITIONER

## 2022-09-14 RX ORDER — LIDOCAINE HYDROCHLORIDE 10 MG/ML
10 INJECTION, SOLUTION EPIDURAL; INFILTRATION; INTRACAUDAL; PERINEURAL ONCE
Status: DISCONTINUED | OUTPATIENT
Start: 2022-09-14 | End: 2022-09-16 | Stop reason: HOSPADM

## 2022-09-14 RX ORDER — BUPIVACAINE HYDROCHLORIDE 5 MG/ML
4 INJECTION, SOLUTION EPIDURAL; INTRACAUDAL ONCE
Status: DISCONTINUED | OUTPATIENT
Start: 2022-09-14 | End: 2022-09-16 | Stop reason: HOSPADM

## 2022-09-14 RX ORDER — BUPIVACAINE HYDROCHLORIDE 5 MG/ML
10 INJECTION, SOLUTION EPIDURAL; INTRACAUDAL ONCE
Status: DISCONTINUED | OUTPATIENT
Start: 2022-09-14 | End: 2022-09-16 | Stop reason: HOSPADM

## 2022-09-14 RX ORDER — TRIAMCINOLONE ACETONIDE 40 MG/ML
80 INJECTION, SUSPENSION INTRA-ARTICULAR; INTRAMUSCULAR ONCE
Status: DISCONTINUED | OUTPATIENT
Start: 2022-09-14 | End: 2022-09-16 | Stop reason: HOSPADM

## 2022-09-14 RX ORDER — DEXTROAMPHETAMINE SACCHARATE, AMPHETAMINE ASPARTATE, DEXTROAMPHETAMINE SULFATE AND AMPHETAMINE SULFATE 5; 5; 5; 5 MG/1; MG/1; MG/1; MG/1
20 TABLET ORAL DAILY
COMMUNITY

## 2022-09-14 RX ORDER — LIDOCAINE HYDROCHLORIDE 10 MG/ML
4 INJECTION, SOLUTION EPIDURAL; INFILTRATION; INTRACAUDAL; PERINEURAL ONCE
Status: DISCONTINUED | OUTPATIENT
Start: 2022-09-14 | End: 2022-09-16 | Stop reason: HOSPADM

## 2022-09-14 NOTE — DISCHARGE INSTRUCTIONS
[address] [place] [address] Physical And Pain Medicine  Post Procedure Discharge Instructions        YOU HAVE HAD THE FOLLOWING PROCEDURE:                                  [] Occipital Nerve Blocks  [] CTS wrist injection(s)  [] Knee Injection(s)         [] Shoulder Injection(s)   [] Elbow Injection(s)     [] Botox Injection  [] Cervical Trigger Point Injections    [] Thoracic Trigger Point Injections    [x] Lumbar Trigger Point Injections  [] Piriformis Trigger Point Injections  [x] SI Joint Injection(s)     [] Trochanteric Bursa Injection(s)       [] Ankle Injection(s)   [] Plantar Fasciitis   []  ______________  Injection(s) [] Botox []  Migraines [] Spasticity    YOU HAVE RECEIVED THE FOLLOWING MEDICATIONS IN YOUR INJECTION(s)  [x] Lidocaine [x] Bupivacaine   [] DepoMedrol (steroid) [] Decadron (steroid)  [x]  Kenalog (steroid)   [] Toradol  [] Supartz [] Juan M Kramer    [] Botox        PATIENT INFORMATION:   You may experience the following symptoms after your procedure. These symptoms are normal and should not cause concern: You may have an increase in your pain. This may last 24 - 48 hours after your procedure. You may have no change in the pain that you had prior to your injection(s). You may have weakness or numbness in your affected extremity. If this occurs, this may last until numbing the medication wears off. REPORT THE FOLLOWING SYMPTOMS TO YOUR DOCTOR:  Redness, swelling or drainage at the injection site(s)  Unusual pain that interferes with your normal activities of daily living. OTHER INSTRUCTIONS:    [x] I will apply ice to the injection site(s) for at least 24 hours after the procedure. I will rotate the ice on for 20 minutes and off for 20 minutes for at least 24 hours. [x] I will not apply heat for at least 48 hours and I will not take a hot bath or shower for at least 24 hours.      [x] I understand that if Lidocaine or Bupivacaine was used in my injection(s) that the injection site(s) will be numb for a few hours after the procedure    [x] I understand if a steroid was used it will take effect in 3 - 7 days. I understand that as the numbing medication wears off, the pain may return until the steroids start working. [x] I understand that today I will rest for the next 24 hours and drink plenty of water. [] For Botox for Migraines please do not wear anything constricting around the forehead for 7-10 days post injection. Examples headband, hats, or bandana    [] For Botox for Spasticity start therapy for the affected limb in two weeks. [] Additional instructions: ______________________________________________ ___________________________________________________________________    Sedation:  Was oral sedation given? [] Yes  [x] No    I understand that if I took an oral nerve calming medication I will not drive for  [] 24 hours after taking the medication.     [x] I have received a copy of my discharge instructions and understand the above instructions to the best of my knowledge    Patient Discharged:  [x] Home  [] Hospital  [] Other  ____________________________________________    Via:  [x] Ambulatory  [] Wheelchair   [] Stretcher [] EMS       Accompanied By:   [] Significant other  [] Family Member  [] Friend   [] Hospital Staff  []  Ambulance Staff  [x] Other_______________________________________________    Plan:  [] Will return to the office in   [] 1 month   [] 3 months for:  [] Procedure Follow-up  [x] Office Visit   [x] Planned Procedure      Patient Signature: _____________________________________________________    Staff Signature: _______________________________________________________        If you have questions, problems or concerns you may call [phone] and follow the prompts    SANDRO Thompson, [facility]

## 2022-09-15 NOTE — PROCEDURES
[facility] Physical & Pain Medicine    Patient Name: Senthil Leyva    : 1975    Age: 49 y.o. Sex: female    Date: 2022    Pre-op Diagnosis: bilateral  Sacroiliac Joint(s) Dysfunction/ Sacroiliitis    Post-op Diagnosis: bilateral  Sacroiliac Joint(s) Dysfunction/ Sacroliliits    Procedure: Ultrasound Guided Injection of  bilateral Sacroiliac Joint(s)     Performing Procedure:  SANDRO Wade    No data found. bilateral  Sacroiliac Joint(s)     Description of Procedure:    After voluntary, informed and signed consent obtained the patient was placed in a prone position. Appropriate time out was obtained per policy. The Sacroiliac Joint(s) was palpated for area of maximal tenderness. The area was prepped in an aseptic fashion with CHG swab. The ultrasound transducer was used to confirm the appropriate location. The skin was sprayed with Gebauer's Solution. Under aseptic technique and direct ultrasound visualization a 22 gauge 3 inch spinal needle was introduced into the Sacroiliac Joint(s). After a negative aspiration, a solution of 2 ml of 0.5% Marcaine Plain and 2 ml of 1% Lidocaine Plain and 1 ml of Kenalog (40 mg/ml) was injected into the Sacroiliac Joint(s). The needle was withdrawn and a sterile dressing applied. If this was a bilateral procedure, the same steps were followed on the opposite side. Pre-op Diagnosis: Myofascial Pain/ Muscle Spasms    Post-op Diagnosis: Myofascial Pain/ Muscle Spasms    Procedure: Lumbar Trigger Point Injections    Performing Procedure: SANDRO Wade; [facility]    No data found. Description of Procedure:    After a brief physical assessment and failure to improve with conservative measures the patient presented for Lumbar Trigger Point Injections The indications, limitations and possible complications were discussed with the patient and the patient elected to proceed with the procedure.     After voluntary, informed and signed consent obtained the patient was placed in a seated position. Appropriate time out was obtained per policy. The area of maximal tenderness was palpated over the bilaterally Lumbar Muscles - Lower Latissimus,  Erector Spinae, Lumbar Paraspinous. The skin overlying these areas was marked with a skin marker. The areas were prepped using aseptic technique with CHG prep. The skin overlying the proposed injection sites were then sprayed with Gebauer's Solution while protecting patient eyes. Each trigger point of the Lumbar Muscles - Lower Latissimus,  Erector Spinae, Lumbar Paraspinous was injected after negative aspiration was injected with approximately 1-2 ml of a solution of 5 ml of 1% Lidocaine Plain and 5 ml of 0.5% Marcaine Plain after negative aspiration    Discharge: The patient tolerated the procedure well. There were no complications during the procedure and the patient was discharged home with discharge instructions. The patient has been instructed to contact the office should there be any complications or questions to arise between today and their next appointment. Plan:     Will return to the office in 6 weeks for follow up    AMNA Sommers CNP, 9/14/2022 at 7:56 PM

## 2022-09-20 ENCOUNTER — TELEPHONE (OUTPATIENT)
Dept: PAIN MANAGEMENT | Age: 47
End: 2022-09-20

## 2022-09-20 DIAGNOSIS — M79.18 MYOFASCIAL PAIN: Primary | ICD-10-CM

## 2022-10-06 DIAGNOSIS — M54.50 CHRONIC BILATERAL LOW BACK PAIN WITHOUT SCIATICA: Chronic | ICD-10-CM

## 2022-10-06 DIAGNOSIS — G89.29 CHRONIC BILATERAL LOW BACK PAIN WITHOUT SCIATICA: Chronic | ICD-10-CM

## 2022-10-06 RX ORDER — HYDROCODONE BITARTRATE AND ACETAMINOPHEN 5; 325 MG/1; MG/1
1 TABLET ORAL 2 TIMES DAILY PRN
Qty: 60 TABLET | Refills: 0 | Status: SHIPPED | OUTPATIENT
Start: 2022-10-10 | End: 2022-10-27 | Stop reason: SDUPTHER

## 2022-10-27 ENCOUNTER — HOSPITAL ENCOUNTER (OUTPATIENT)
Dept: PAIN MANAGEMENT | Age: 47
Discharge: HOME | End: 2022-10-27
Payer: MEDICARE

## 2022-10-27 VITALS
DIASTOLIC BLOOD PRESSURE: 78 MMHG | WEIGHT: 141.04 LBS | HEART RATE: 83 BPM | HEIGHT: 63 IN | RESPIRATION RATE: 18 BRPM | TEMPERATURE: 97.7 F | OXYGEN SATURATION: 98 % | SYSTOLIC BLOOD PRESSURE: 121 MMHG | BODY MASS INDEX: 24.99 KG/M2

## 2022-10-27 DIAGNOSIS — M79.18 MYOFASCIAL PAIN: Primary | ICD-10-CM

## 2022-10-27 DIAGNOSIS — G89.29 CHRONIC BILATERAL LOW BACK PAIN WITHOUT SCIATICA: Chronic | ICD-10-CM

## 2022-10-27 DIAGNOSIS — M54.50 CHRONIC BILATERAL LOW BACK PAIN WITHOUT SCIATICA: Chronic | ICD-10-CM

## 2022-10-27 DIAGNOSIS — G43.019 INTRACTABLE MIGRAINE WITHOUT AURA AND WITHOUT STATUS MIGRAINOSUS: Chronic | ICD-10-CM

## 2022-10-27 PROCEDURE — 99213 OFFICE O/P EST LOW 20 MIN: CPT

## 2022-10-27 RX ORDER — ATOGEPANT 60 MG/1
1 TABLET ORAL DAILY
Qty: 30 TABLET | Refills: 5 | Status: SHIPPED | OUTPATIENT
Start: 2022-10-27

## 2022-10-27 RX ORDER — HYDROCODONE BITARTRATE AND ACETAMINOPHEN 5; 325 MG/1; MG/1
1 TABLET ORAL 2 TIMES DAILY PRN
Qty: 60 TABLET | Refills: 0 | Status: SHIPPED | OUTPATIENT
Start: 2022-11-07 | End: 2022-12-07

## 2022-10-27 RX ORDER — DULOXETIN HYDROCHLORIDE 30 MG/1
30 CAPSULE, DELAYED RELEASE ORAL DAILY
COMMUNITY

## 2022-10-27 ASSESSMENT — ENCOUNTER SYMPTOMS
CONSTIPATION: 0
SCALP TENDERNESS: 1
NAUSEA: 1
PHOTOPHOBIA: 1
BACK PAIN: 1
BLURRED VISION: 1
VOMITING: 1
VISUAL CHANGE: 1

## 2022-10-27 ASSESSMENT — PAIN SCALES - GENERAL: PAINLEVEL_OUTOF10: 3

## 2022-10-27 ASSESSMENT — PAIN DESCRIPTION - LOCATION
LOCATION: BACK
LOCATION_2: NECK
LOCATION_3: HEAD

## 2022-10-27 ASSESSMENT — PAIN DESCRIPTION - INTENSITY
RATING_3: 4
RATING_2: 4

## 2022-10-27 ASSESSMENT — PAIN DESCRIPTION - ORIENTATION: ORIENTATION: LOWER

## 2022-10-27 ASSESSMENT — PAIN DESCRIPTION - PAIN TYPE: TYPE: CHRONIC PAIN

## 2022-10-27 NOTE — PROGRESS NOTES
Clinic Documentation      Education Provided:  [x] Review of Claudina Rubinstein  [] Agreement Review  [x] PEG Score Calculated [] PHQ Score Calculated [] ORT Score Calculated    [] Compliance Issues Discussed [] Cognitive Behavior Needs [x] Exercise [] Review of Test [] Financial Issues  [x] Tobacco/Alcohol Use Reviewed [x] Teaching [] New Patient [] Picture Obtained    Physician Plan:  [] Outgoing Referral  [] Pharmacy Consult  [] Test Ordered [x] Prescription Ordered/Changed   [] Obtained Test Results / Consult Notes        Complete if patient is withholding blood thinner for procedure     Blood Thinner Patient is currently taking:      [] Plavix (Hold for 7 days)  [] Aspirin (Hold for 5 days)     [] Pletal (Hold for 2 days)  [] Pradaxa (Hold for 3 days)    [] Effient (Hold for 7 days)  [] Xarelto (Hold for 2 days)    [] Eliquis (Hold for 2 days)  [] Brilinta (Hold for 7 days)    [] Coumadin (Hold for 5 days) - (INR needs to be drawn the day prior to procedure- INR < 2.0)    [] Aggrenox (Hold for 7 days)        [] Patient will stop medication on their own.    [] Blood Thinner Form Faxed for approval to hold.    Provider form faxed to:     Assessment Completed by:  Electronically signed by Nguyen Garcia RN on 10/27/2022 at 4:07 PM

## 2022-10-27 NOTE — PROGRESS NOTES
[facility] Physical & Pain Medicine    Office Visit    Patient Name: Vlad Arevalo    MR #: [id number]    Account [de-identified]    : 1975    Age: 49 y.o. Sex: female    Date: 10/27/2022    PCP: Houston Pimetnel MD    Chief Complaint:   Chief Complaint   Patient presents with    Back Pain    Neck Pain    Migraine       History of Present Illness: The patient is a 49 y.o. female who is for procedure follow-up. Patient had Botox for migraines on 8/3/2022. Patient had over 100 hours of migraine freedom per month with at least 50% reduction in mean headache days per month with a decrease in severity, in intensity and in aggravating symptoms compared to pre-treatment. Yes. Patient has had improvement in quality of life with reduction in migraines and migraine symptoms Yes. Patient has had a decrease in the use of acute migraine medications such as triptians, NSAID'S, and CGRP inhibitors Yes. Patient continues to be evaluated for medication over use headaches [facility]) Yes    . Had bilateral cervical and thoracic trigger points on 8/3/2022. Patient had at least 85% relief of pain from procedure(s) for at least 6 weeks and was able to increase activity after procedure. Patient received enough pain relief from injections that the patient would like to repeat the injection(s). Patient states that trigger point injections on the same day as Botox injections helps with her migraine. Bilateral lumbar trigger points with bilateral SI injections on 2022. Patient had at least 85% relief of pain from procedure(s) for at least 6 weeks and was able to increase activity after procedure. Patient received enough pain relief from injections that the patient would like to repeat the injection(s). Patient is having a increase in migraines on the last two weeks of her botox cycle and the first two weeks post injection. This is common due to the half life of the medication.      Back Pain  This is a chronic problem. The current episode started more than 1 year ago. The problem occurs intermittently. The problem has been waxing and waning since onset. The pain is present in the lumbar spine and sacro-iliac. The quality of the pain is described as cramping and aching. The symptoms are aggravated by bending, sitting and standing. Associated symptoms include headaches. Neck Pain   This is a chronic problem. The current episode started more than 1 year ago. The problem occurs constantly. The problem has been waxing and waning. The quality of the pain is described as aching and cramping. Associated symptoms include headaches, photophobia and a visual change. Headache   This is a chronic problem. The current episode started more than 1 year ago. The problem occurs daily. The problem has been waxing and waning (recently patient is having more migraines and she has one at this appointment). The pain is located in the Bilateral, frontal and occipital region. The quality of the pain is described as aching, stabbing, throbbing and squeezing. Associated symptoms include back pain, blurred vision, nausea, neck pain, phonophobia, photophobia, scalp tenderness, a visual change and vomiting. The symptoms are aggravated by bright light, activity, emotional stress and noise. Screening Tools:    PEG Score: 2.3     Last PEG Score: 2     Annual ORT Score: 2     Annual PHQ Score: 13    Current Pain Assessment  Pain Assessment  Pain Assessment: 0-10  Pain Level: 3  Pain Location: Back  Pain Orientation: Lower  Pain Type: Chronic pain    Past Visit HPI:  4/14/2022  Patient has been having more migraines in the past month or two. She states that she is having at least 3 migraines per month. She cannot identify any specific trigger. She states that the Imitrex does not help at all anymore. She tried Ubrevly and she did have some relief. 3/1/2021  presents for follow up of procedure.  Patient had Lumbar trigger point injections on 11/18/2020, Cervical and thoracic trigger point injections on 1/6/2021, and Botox for Migraines on 1/13/2021. Patient had at least 85% relief of pain from procedure(s) for at least 6 weeks and was able to increase activity after procedure. Patient received enough pain relief from injections that the patient would like to repeat the injection(s). Patient states that she does not have to use abortive migraine medications until the last two to four weeks prior to Botox injections and sometimes the two weeks after receiving injections. Patient will take Fioricet first especially is she is not at home then she will take Imitrex. The Imitrex causes her to be very drowsy. Patient is behind on lumbar injections due to providers scheduled and her low back is bothering her a little more right now. 11/10/2020  presents for procedure follow-up. Patient had bilateral SI joint injections with lumbar trigger points on 7/1/2020, patient had cervical and thoracic trigger points on 7/15/2020, and patient had Botox injections on 9/2/2020. Patient had at least 85% relief of pain from procedure(s) for at least 6 weeks and was able to increase activity after procedure. Patient received enough pain relief from injections that the patient would like to repeat the injection(s).    6/22/2020  presents to the office for annual exam with primary complaints of chronic headaches and back pain. Patient was referred to this office last April 2018 with a past history of chiari malformation. Patient had a posterior fossa decompression and her pain had pretty much resolved for approximately 3 years. However during the year prior to coming back to this office patient started developing pain again. She started having chronic headaches, along with myofascial pain in the shoulders and thoracic spine.  Through the years patient has been on numerous medications to treat headaches including the following but not limited to Topamax, amitriptyline, propranolol, Imitrex, gabapentin, and Lyrica, Fioricet does help with the intense headaches but he does not make headaches tolerable. She does follow with neurology and she has been started on Aimovig which helps. Patient had been getting bilateral occipital nerve blocks along with trigger point injections in her cervical, thoracic, and lumbar spine. Patient was changed to botox injections in October 2019. Patient has had excellent results from botox injections. Patient did have a flare up of low back pain. She has had SI injections which have made low back pain improve. Past Medical History  Past Medical History:   Diagnosis Date    ADHD (attention deficit hyperactivity disorder)     Anemia     Anxiety     Cervicalgia     Chiari malformation type I ([address]     s/p decompression 2014    Chronic back pain     Depression     Headache(784.0)     Hiatal hernia     Hyperlipidemia     Indigestion     Migraines     Obstructive sleep apnea     Mild with an AHI:  7.5 and  RDI:  14.7       Medications  Current Outpatient Medications   Medication Sig Dispense Refill    DULoxetine (CYMBALTA) 30 MG extended release capsule Take 30 mg by mouth daily      HYDROcodone-acetaminophen (NORCO) 5-325 MG per tablet Take 1 tablet by mouth 2 times daily as needed for Pain for up to 30 days. (May fill 10/10/22) 60 tablet 0    amphetamine-dextroamphetamine (ADDERALL) 20 MG tablet Take 20 mg by mouth daily. butalbital-acetaminophen-caffeine (FIORICET, ESGIC) -40 MG per tablet Take 1 tablet by mouth daily as needed for Headaches (may repeat in 4 hours x 1 no more than 2 per day) 30 tablet 2    lidocaine (LIDODERM) 5 % Place 3 patches onto the skin daily 12 hours on, 12 hours off. 30 patch 2    Ubrogepant (UBRELVY) 100 MG TABS Take 1 tablet by mouth daily as needed (Migraine may repeat x 1 within 4 hours) Take 1 tablet at the start of a migraine. May repeat x1 within 4 hours.  Do not exceed 9 days per month 16 tablet 5    traZODone (DESYREL) 150 MG tablet Take 150 mg by mouth daily      Atogepant (QULIPTA) 60 MG TABS Take 1 tablet by mouth daily 30 tablet 2    ALPRAZolam (XANAX) 1 MG tablet Take 1 mg by mouth 2 times daily as needed. lamoTRIgine (LAMICTAL) 200 MG tablet Take 200 mg by mouth nightly      ibuprofen (ADVIL;MOTRIN) 800 MG tablet Take 1 tablet by mouth 3 times daily as needed for Pain 90 tablet 2    zolpidem (AMBIEN) 10 MG tablet Take 1 tablet by mouth nightly. .  2     No current facility-administered medications for this encounter. Allergies  Ultram [tramadol]    Current Medications  Current Outpatient Medications   Medication Sig Dispense Refill    DULoxetine (CYMBALTA) 30 MG extended release capsule Take 30 mg by mouth daily      HYDROcodone-acetaminophen (NORCO) 5-325 MG per tablet Take 1 tablet by mouth 2 times daily as needed for Pain for up to 30 days. (May fill 10/10/22) 60 tablet 0    amphetamine-dextroamphetamine (ADDERALL) 20 MG tablet Take 20 mg by mouth daily. butalbital-acetaminophen-caffeine (FIORICET, ESGIC) -40 MG per tablet Take 1 tablet by mouth daily as needed for Headaches (may repeat in 4 hours x 1 no more than 2 per day) 30 tablet 2    lidocaine (LIDODERM) 5 % Place 3 patches onto the skin daily 12 hours on, 12 hours off. 30 patch 2    Ubrogepant (UBRELVY) 100 MG TABS Take 1 tablet by mouth daily as needed (Migraine may repeat x 1 within 4 hours) Take 1 tablet at the start of a migraine. May repeat x1 within 4 hours. Do not exceed 9 days per month 16 tablet 5    traZODone (DESYREL) 150 MG tablet Take 150 mg by mouth daily      Atogepant (QULIPTA) 60 MG TABS Take 1 tablet by mouth daily 30 tablet 2    ALPRAZolam (XANAX) 1 MG tablet Take 1 mg by mouth 2 times daily as needed.       lamoTRIgine (LAMICTAL) 200 MG tablet Take 200 mg by mouth nightly      ibuprofen (ADVIL;MOTRIN) 800 MG tablet Take 1 tablet by mouth 3 times daily as needed for Pain 90 tablet 2 zolpidem (AMBIEN) 10 MG tablet Take 1 tablet by mouth nightly. .  2     No current facility-administered medications for this encounter. Social History    Social History     Socioeconomic History    Marital status:      Spouse name: None    Number of children: 2    Years of education: None    Highest education level: None   Tobacco Use    Smoking status: Never    Smokeless tobacco: Never   Vaping Use    Vaping Use: Never used   Substance and Sexual Activity    Alcohol use: No    Drug use: No    Sexual activity: Yes     Partners: Male         Family History  family history includes Cancer in her maternal aunt and another family member; High Blood Pressure in her father and sister. Review of Systems:  Review of Systems   Constitutional:  Positive for activity change. Eyes:  Positive for blurred vision, photophobia and visual disturbance. Gastrointestinal:  Positive for nausea and vomiting. Negative for constipation. Musculoskeletal:  Positive for arthralgias, back pain, myalgias, neck pain and neck stiffness. Neurological:  Positive for headaches. Psychiatric/Behavioral:  Positive for sleep disturbance. Negative for agitation, self-injury and suicidal ideas. The patient is not nervous/anxious. 14 point ROS negative besides that noted in HPI    Physical exam:     Patient Vitals for the past 24 hrs:   BP Temp Temp src Pulse Resp SpO2 Height Weight   10/27/22 1600 121/78 97.7 °F (36.5 °C) Tympanic 83 18 98 % 5' 3\" (1.6 m) 141 lb 0.6 oz (64 kg)       Body mass index is 24.98 kg/m². Physical Exam  Vitals and nursing note reviewed. Constitutional:       General: She is not in acute distress. Appearance: She is well-developed. HENT:      Head: Normocephalic. Right Ear: External ear normal.      Left Ear: External ear normal.      Nose: Nose normal.   Eyes:      Conjunctiva/sclera: Conjunctivae normal.      Pupils: Pupils are equal, round, and reactive to light. Cardiovascular:      Rate and Rhythm: Normal rate and regular rhythm. Pulmonary:      Effort: Pulmonary effort is normal.      Breath sounds: Normal breath sounds. Abdominal:      General: Bowel sounds are normal.      Palpations: Abdomen is soft. Musculoskeletal:      Cervical back: Normal range of motion and neck supple. Spasms (Noted to have tender palpable knots noted in muscles ) and tenderness present. Pain with movement present. Thoracic back: Spasms (Noted to have tender palpable knots noted in muscles ) and tenderness present. Lumbar back: Spasms (Noted to have tender palpable knots noted in muscles ) and tenderness (Positive Bilateral  SI tenderness noted. 3 + provacative test) present. Skin:     General: Skin is warm and dry. Neurological:      Mental Status: She is alert and oriented to person, place, and time. Cranial Nerves: No cranial nerve deficit. Sensory: No sensory deficit. Deep Tendon Reflexes: Reflexes are normal and symmetric. Psychiatric:         Behavior: Behavior normal.         Thought Content:  Thought content normal.         Judgment: Judgment normal.       LABS:     Lab Results   Component Value Date/Time     08/27/2011 05:15 PM    K 4.0 08/27/2011 05:15 PM     08/27/2011 05:15 PM    CO2 32 08/27/2011 05:15 PM    BUN 10 08/27/2011 05:15 PM    CREATININE 0.8 08/27/2011 05:15 PM    GLUCOSE 94 08/27/2011 05:15 PM    CALCIUM 9.3 08/27/2011 05:15 PM        Lab Results   Component Value Date    WBC 6.87 08/27/2011    HGB 12.6 08/27/2011    HCT 38.0 08/27/2011    MCV 91.1 08/27/2011     08/27/2011       Assessment:                                                                                                                                        Active Problems:    Cervical paraspinal muscle spasm    Spasm of muscle of lower back    Restless leg syndrome    Chiari malformation type I (HCC)    Intractable migraine without aura and without status migrainosus    Myofascial pain    SI (sacroiliac) joint dysfunction    Chronic bilateral low back pain without sciatica    Pain management contract agreement  Resolved Problems:    * No resolved hospital problems. *      PLAN:  Medications:   Intractable migraine without aura and without status migrainosus    Continue medication with no refill sent at appointment see refill encounter. butalbital-acetaminophen-caffeine (FIORICET, ESGIC) -40 MG per tablet; Take 1 tablet by mouth daily as needed for Headaches (may repeat in 4 hours x 1 no more than 2 per day)  Dispense: 30 tablet; Refill: 2    - Atogepant (QULIPTA) 60 MG TABS; Take 1 tablet by mouth daily  Dispense: 30 tablet; Refill: 2  - Ubrogepant (UBRELVY) 100 MG TABS; Take 1 tablet by mouth daily as needed (Migraine may repeat x 1 within 4 hours) Take 1 tablet at the start of a migraine. May repeat x1 within 4 hours. Do not exceed 9 days per month  Dispense: 16 tablet; Refill: 5     Stop Nurtec and start [place]. Patient can take daily preventative medication and use abortive medication coupled with Botox patient should to complete migraine freedom. Had used Ubrelvy in the past which had helped tried to use Nurtec as preventative and abortive but patient needed better combination. Patient has failed triptans in the past. Patient has tried [facility] samples and she achieved significant results with the [place]. She had almost complete freedom of migraines and headaches. However patient is having difficult with insurance covering the [facility]. Chronic bilateral low back pain without sciatica  - HYDROcodone-acetaminophen (NORCO) 5-325 MG per tablet; Take 1 tablet by mouth 2 times daily as needed for Pain for up to 30 days. Dispense: 60 tablet; Refill: 0     2. Intractable migraine without aura and without status migrainosus  - Atogepant (QULIPTA) 60 MG TABS; Take 1 tablet by mouth daily  Dispense: 30 tablet;  Refill: 5 Continue medication with no refill sent at appointment see refill encounter. Ibuprofen (Advil) 800 mg TID prn # 90 with 2 RF's    Continue medication with no refill sent at appointment see refill encounter. Lidocaine (lidoderm) 5% 1-3 patches transdermal daily # 30 2 RF's    Treatments:   Continue Botox for migraine. Patient obtained 50% reduction in mean headache days per month with a decrease in severity, intensity, and aggravating symptoms as compared with pretreatment and patient had over 100 hours of migraine freedom per month after Botox    Repeat with cervical and thoracic trigger point injections for myofascial pain as patient has had good response in the past. These work best if given same day as botox. Repeat with lumbar trigger point injections for myofacial pain as patient has had a good response to these injections in the past. These work best if given same day as SI joint injections. Repeat with bilateral SI joint injections with ultrasound for SI joint dysfunction and chronic low back pain as patient has had good response with these before. Order placed for Kyruusve 3 in 1 device. Patient suffers from SI joint dysfunction. Chronic low back pain, muscle spasms of neck and low back. [employer] NexWave is a prescription only 3-in-1 device with IFC, TENS, and NMES. The device is used to help manage their pain symptoms, re-educate and strengthen muscles, and or hopefully reduce or eliminate their need for pain medications. IFC can help extend pain relief and hopefully reduce need for pain medication. TENS is used to control break through pain. NMES is used to help with muscle spasms and strengthen weak muscles. Electrotherapy is effective and can increase activity levels by over 60% and has shown, in some patients, a reduction of pain medication usage by 50% (1).      Vanesa HUSAIN, Apryl C, Nia A, Jenniffer LW, Ary R ([id number]) Transcutaneous Electric Nerve Stimulation Treatment Outcome in Long-Term Users. Clinical Journal of Pain, 12(3), 1996, p.201      [x] Follow up    [] 4 weeks   [x] 6-8 weeks   [] 10-12 weeks   [] 3 months  [x] Post procedure to evaluate effectiveness of treatment  [] To evaluate medications changes made at office visit. [] To review diagnostics ordered at last visit. [] To evaluate response to therapy    [x] For management of controlled substance  [x] Random UDS as indicated by ORT score or if indicated by abberent behaviors    Discussion: Discussed exam findings and plan of care with patient. Patient agreed with POC and questions were asked and answered. Activity: discussed exercise as beneficial to pain reduction, encouraged stretching exercise with a focus on torso strengthening. Goals:  Pain Management Goals of Therapy:   [] Resolution in pain  [x] Decrease in pain level  [x] Improvement in ADL's  [x] Increase in activities with less pain  [] Decrease in medication      Controlled substance monitoring:    [x] Discussed medication side effects, risk of tolerance and/or dependence, and/or alternative treatment  [] Discussed the detrimental effects of long term narcotic use in younger patients especially women of childbearing years.   [x] No signs and symptoms of potential drug abuse or diversion were identified  [] Signs of potential drug abuse or diversion were identified   [] ORT Score   [] UDS non-compliant   [] See Note  [] Random urine drug screen sent today   [x] Random urine drug screen not completed today   [] Deferred New Patient  [x] Compliant 4/2022 saliva  [] Not Compliant see note  [] Medication agreement with provider signed today 4/2022  [x] Medication agreement with provider on file under media   [] Medication regimen effective with c/o of side effects noted and continued   [] New patient continuing current medication while developing POC   [x] On going assessment and evaluation of medication regimen  [] Medication regimen not effective see plan for changes  [x] Avtar Carlton reviewed & on file under media     CC: MD Vitaliy Mott Che, APRN - CNP, 10/27/2022 at 4:23 PM    EMR dragon/transcription disclaimer: Much of this encounter note is electronic transcription/translation of spoken language to printed tach. Electronic translation of spoken language may be erroneous, or at times, nonsensical words or phrases may be inadvertently transcribed.  Although, I have reviewed the note for such errors, some may still exist.

## 2022-10-27 NOTE — TELEPHONE ENCOUNTER
1. Chronic bilateral low back pain without sciatica  - HYDROcodone-acetaminophen (NORCO) 5-325 MG per tablet; Take 1 tablet by mouth 2 times daily as needed for Pain for up to 30 days. Dispense: 60 tablet; Refill: 0    2. Intractable migraine without aura and without status migrainosus  - Atogepant (QULIPTA) 60 MG TABS; Take 1 tablet by mouth daily  Dispense: 30 tablet;  Refill: 5

## 2022-11-02 DIAGNOSIS — Z86.19 HISTORY OF SHINGLES: ICD-10-CM

## 2022-11-02 DIAGNOSIS — G43.019 INTRACTABLE MIGRAINE WITHOUT AURA AND WITHOUT STATUS MIGRAINOSUS: Chronic | ICD-10-CM

## 2022-11-09 ENCOUNTER — HOSPITAL ENCOUNTER (OUTPATIENT)
Dept: PAIN MANAGEMENT | Age: 47
Discharge: HOME | End: 2022-11-09
Payer: MEDICARE

## 2022-11-09 VITALS
OXYGEN SATURATION: 100 % | RESPIRATION RATE: 18 BRPM | SYSTOLIC BLOOD PRESSURE: 119 MMHG | TEMPERATURE: 97.8 F | HEART RATE: 93 BPM | DIASTOLIC BLOOD PRESSURE: 80 MMHG

## 2022-11-09 DIAGNOSIS — M25.562 CHRONIC PAIN OF BOTH KNEES: ICD-10-CM

## 2022-11-09 DIAGNOSIS — M25.561 CHRONIC PAIN OF BOTH KNEES: ICD-10-CM

## 2022-11-09 DIAGNOSIS — G89.29 CHRONIC PAIN OF BOTH KNEES: ICD-10-CM

## 2022-11-09 PROCEDURE — 2500000003 HC RX 250 WO HCPCS

## 2022-11-09 PROCEDURE — 20611 DRAIN/INJ JOINT/BURSA W/US: CPT | Performed by: NURSE PRACTITIONER

## 2022-11-09 PROCEDURE — 20611 DRAIN/INJ JOINT/BURSA W/US: CPT

## 2022-11-09 PROCEDURE — 6360000002 HC RX W HCPCS

## 2022-11-09 RX ORDER — LIDOCAINE HYDROCHLORIDE 10 MG/ML
4 INJECTION, SOLUTION EPIDURAL; INFILTRATION; INTRACAUDAL; PERINEURAL ONCE
Status: DISCONTINUED | OUTPATIENT
Start: 2022-11-09 | End: 2022-11-11 | Stop reason: HOSPADM

## 2022-11-09 NOTE — DISCHARGE INSTRUCTIONS
[address] [place] [address] Physical And Pain Medicine  Post Procedure Discharge Instructions        YOU HAVE HAD THE FOLLOWING PROCEDURE:                                  [] Occipital Nerve Blocks  [] CTS wrist injection(s)  [x] Knee Injection(s)         [] Shoulder Injection(s)   [] Elbow Injection(s)     [] Botox Injection  [] Cervical Trigger Point Injections    [] Thoracic Trigger Point Injections    [] Lumbar Trigger Point Injections  [] Piriformis Trigger Point Injections  [] SI Joint Injection(s)     [] Trochanteric Bursa Injection(s)       [] Ankle Injection(s)   [] Plantar Fasciitis   []  ______________  Injection(s) [] Botox []  Migraines [] Spasticity    YOU HAVE RECEIVED THE FOLLOWING MEDICATIONS IN YOUR INJECTION(s)  [x] Lidocaine [] Bupivacaine   [] DepoMedrol (steroid) [] Decadron (steroid)  []  Kenalog (steroid)   [] Toradol  [] Supartz [x] Marian Webster    [] Botox        PATIENT INFORMATION:   You may experience the following symptoms after your procedure. These symptoms are normal and should not cause concern: You may have an increase in your pain. This may last 24 - 48 hours after your procedure. You may have no change in the pain that you had prior to your injection(s). You may have weakness or numbness in your affected extremity. If this occurs, this may last until numbing the medication wears off. REPORT THE FOLLOWING SYMPTOMS TO YOUR DOCTOR:  Redness, swelling or drainage at the injection site(s)  Unusual pain that interferes with your normal activities of daily living. OTHER INSTRUCTIONS:    [x] I will apply ice to the injection site(s) for at least 24 hours after the procedure. I will rotate the ice on for 20 minutes and off for 20 minutes for at least 24 hours. [x] I will not apply heat for at least 48 hours and I will not take a hot bath or shower for at least 24 hours.      [x] I understand that if Lidocaine or Bupivacaine was used in my injection(s) that the injection site(s) will be numb for a few hours after the procedure    [x] I understand if a steroid was used it will take effect in 3 - 7 days. I understand that as the numbing medication wears off, the pain may return until the steroids start working. [x] I understand that today I will rest for the next 24 hours and drink plenty of water. [] For Botox for Migraines please do not wear anything constricting around the forehead for 7-10 days post injection. Examples headband, hats, or bandana    [] For Botox for Spasticity start therapy for the affected limb in two weeks. [] Additional instructions: ______________________________________________ ___________________________________________________________________    Sedation:  Was oral sedation given? [] Yes  [x] No    I understand that if I took an oral nerve calming medication I will not drive for  [] 24 hours after taking the medication.     [x] I have received a copy of my discharge instructions and understand the above instructions to the best of my knowledge    Patient Discharged:  [x] Home  [] Hospital  [] Other  ____________________________________________    Via:  [x] Ambulatory  [] Wheelchair   [] Stretcher [] EMS       Accompanied By:   [x] Significant other  [] Family Member  [] Friend   [] Hospital Staff  []  Ambulance Staff  [] Other_______________________________________________    Plan:  [x] Will return to the office in   [] 1 month   [] 3 months for:  [] Procedure Follow-up  [] Office Visit   [] Planned Procedure      Patient Signature: _____________________________________________________    Staff Signature: _______________________________________________________        If you have questions, problems or concerns you may call [phone] and follow the prompts    SANDRO Foss, [place]-BC

## 2022-11-09 NOTE — PROGRESS NOTES
Procedure:  Level of Consciousness: [x]Alert [x]Oriented []Disoriented []Lethargic  Anxiety Level: [x]Calm []Anxious []Depressed []Other  Skin: [x]Warm [x]Dry []Cool []Moist []Intact []Other  Cardiovascular: [x]Palpitations: [x]Never []Occasionally []Frequently  Chest Pain: [x]No []Yes  Respiratory:  [x]Unlabored []Labored []Cough ([] Productive []Unproductive)  HCG Required: [x]No []Yes   Results: []Negative []Positive  Knowledge Level:        [x]Patient/Other verbalized understanding of pre-procedure instructions. [x]Assessment of post-op care needs (transportation, responsible caregiver)        [x]Able to discuss health care problems and how to deal with it.   Factors that Affect Teaching:        Language Barrier: [x]No []Yes - why:        Hearing Loss:        [x]No []Yes            Corrective Device:  []Yes []No        Vision Loss:           []No [x]Yes            Corrective Device:  [x]Yes []No        Memory Loss:       [x]No []Yes            []Short Term []Long Term  Motivational Level:  [x]Asks Questions                  []Extremely Anxious       [x]Seems Interested               []Seems Uninterested                  []Denies need for Education  Risk for Injury:  [x]Patient oriented to person, place and time  []History of frequent falls/loss of balance  Nutritional:  []Change in appetite   []Weight Gain   []Weight Loss  Functional:  []Requires assistance with ADL's

## 2022-11-10 NOTE — PROCEDURES
[address] [place] [address] Physical & Pain Medicine    Patient Name: Tracee Page    : 1975    Age: 49 y.o. Sex: female    Date: 2022    Preop Diagnosis: Osteoarthritis of bilateral Knee(s)    Postop Diagnosis: Osteoarthritis of  bilateral  Knee(s)      Procedure: Injection of  bilateral  Knee(s) with US confirmation    Performing Procedure: Deshawn De Leon AGACNP-BC    Patient Vitals for the past 24 hrs:   BP Temp Temp src Pulse Resp SpO2   22 1342 119/80 97.8 °F (36.6 °C) Temporal 93 18 100 %       Description of Procedure:    After a brief physical assessment and failure to improve with conservative measures the patient presented for an injection of the bilateral Knee(s). The indications, limitations and possible complications were discussed with the patient and the patient elected to proceed with the procedure. bilateral Knee(s)    After voluntary, informed and signed consent obtained the patient was placed in a seated position. Appropriate time out was obtained per policy. The knee was palpated at the anterior aspect of the knee to locate the patellar tendon. The projected injection site is approximately 1 cm medially or laterally of the midpoint of the tendon. Usually, there is a depression. The area was marked. The ultrasound transducer was used to confirm the appropriate location. The skin prepped in an aseptic fashion with CHG swab and then sprayed with Gebauer's Solution. Under aseptic technique 22 gauge 1 1/2 inch needle was then introduced into the Knee(s). After a negative aspiration, 1 ml of 1% Lidocaine Plain was injected into the knee prior to the 5 ml reconstituted Zilretta, which consists of  4 ml prepackaged solution, mixed with Zilretta (32mg/ml) constitute and this was injected into the Knee(s). The needle was withdrawn and a sterile dressing applied. If this was a bilateral procedure the same steps were followed on the opposite side. Discharge:   The patient tolerated the procedure well. There were no complications during the procedure and the patient was discharged home with discharge instructions. The patient has been instructed to contact the office should there be any complications or questions to arise between today and their next appointment. Plan:    The patient will follow up in the office in approximately 6 weeks.      AMNA Herron - CNP, 11/9/2022 at 11:30 PM

## 2022-11-16 ENCOUNTER — HOSPITAL ENCOUNTER (OUTPATIENT)
Dept: PAIN MANAGEMENT | Age: 47
Discharge: HOME | End: 2022-11-16
Payer: MEDICARE

## 2022-11-16 VITALS
RESPIRATION RATE: 18 BRPM | SYSTOLIC BLOOD PRESSURE: 121 MMHG | HEART RATE: 90 BPM | OXYGEN SATURATION: 100 % | DIASTOLIC BLOOD PRESSURE: 79 MMHG

## 2022-11-16 PROCEDURE — 6360000002 HC RX W HCPCS

## 2022-11-16 PROCEDURE — 2580000003 HC RX 258

## 2022-11-16 PROCEDURE — 2500000003 HC RX 250 WO HCPCS

## 2022-11-16 PROCEDURE — 64615 CHEMODENERV MUSC MIGRAINE: CPT | Performed by: NURSE PRACTITIONER

## 2022-11-16 PROCEDURE — 20553 NJX 1/MLT TRIGGER POINTS 3/>: CPT | Performed by: NURSE PRACTITIONER

## 2022-11-16 PROCEDURE — 64615 CHEMODENERV MUSC MIGRAINE: CPT

## 2022-11-16 PROCEDURE — 20553 NJX 1/MLT TRIGGER POINTS 3/>: CPT

## 2022-11-16 PROCEDURE — A4216 STERILE WATER/SALINE, 10 ML: HCPCS

## 2022-11-16 RX ORDER — BUPIVACAINE HYDROCHLORIDE 5 MG/ML
10 INJECTION, SOLUTION EPIDURAL; INTRACAUDAL ONCE
Status: DISCONTINUED | OUTPATIENT
Start: 2022-11-16 | End: 2022-11-18 | Stop reason: HOSPADM

## 2022-11-16 RX ORDER — SODIUM CHLORIDE 9 MG/ML
4.2 INJECTION INTRAVENOUS ONCE
Status: DISCONTINUED | OUTPATIENT
Start: 2022-11-16 | End: 2022-11-18 | Stop reason: HOSPADM

## 2022-11-16 RX ORDER — LIDOCAINE HYDROCHLORIDE 10 MG/ML
10 INJECTION, SOLUTION EPIDURAL; INFILTRATION; INTRACAUDAL; PERINEURAL ONCE
Status: DISCONTINUED | OUTPATIENT
Start: 2022-11-16 | End: 2022-11-18 | Stop reason: HOSPADM

## 2022-11-16 NOTE — PROCEDURES
[address]    Patient Name: Blaire Kelley    : 1975                    Age: 49 y.o. Sex: female    Date: 2022    Pre-op Diagnosis: Chronic Migraines    Post-op Diagnosis: Chronic Migraines    Procedure: Botox injections x 155 units (45 units wasted)    Performing Procedure: AMNA Lucio    Patient Vitals for the past 24 hrs:   BP Pulse Resp SpO2   22 1539 121/79 90 18 100 %       Previous Injections:  Patient had 24/2 headaches/migraines over the past month. Indications:    Blaire Kelley has been treated for problems with chronic migraine headaches. The patient was taken through a conservative course of treatment without complete resolution of symptoms. Botox injection therapy was offered and after the risks and benefits of the procedure were explained to the patient, we had agreed to proceed. The patient was taken to the procedure room and placed in the seated position. The following muscles were identified using palpation and standard landmarks. These muscles were marked and prepped with alcohol. A total of 155 units were injected after careful aspiration and the following distribution bilaterally:  10 units in 2 sites, procerus 5 units in one site, frontalis 20 units in 4 sites, temporalis 40 unit in 8 sites, occipitals 30 units in 6 sites, cervical paraspinals 20 units in 4 sites, and trapezius 30 units in 6 sites.     Pre-op Diagnosis: Myofascial Pain/ Muscle Spasms/ Cervicalgia    Post-op Diagnosis: Myofascial Pain/ Muscle Spasms/ Cervicalgia    Procedure: Cervical Trigger Point Injections    Performing Procedure: NEDA LucioP - BC, VA-BC    Patient Vitals for the past 24 hrs:   BP Pulse Resp SpO2   22 1539 121/79 90 18 100 %       Description of Procedure:    After a brief physical assessment and failure to improve with conservative measures the patient presented for Cervical Trigger Point Injections The indications, limitations and possible complications were discussed with the patient and the patient elected to proceed with the procedure. After voluntary, informed and signed consent obtained the patient was placed in a seated position. Appropriate time out was obtained per policy. The area of maximal tenderness was palpated over the   bilaterally Cervical muscles - Splenius  Trapezius  Rhomboid The skin overlying these areas was marked with a skin marker. The skin overlying the proposed injection sites were then sprayed with Gebauer's Solution while protecting patient eyes. The areas were prepped using aseptic technique with CHG prep. Each trigger point of the Cervical muscles - Splenius  Trapezius  Rhomboid was injected with approximately 1-2 ml of a solution of 5 ml of 1% Lidocaine Plain and 5 ml of 0.5% Marcaine Plain after negative aspiration    Discharge: The patient tolerated the procedure well. There were no complications during the procedure and the patient was discharged home with discharge instructions. The patient has been instructed to contact the office should there be any complications or questions to arise between today and their next appointment. Plan:    Patient to follow up in the office in about 6 weeks.      AMNA Berg - CNP, 11/16/2022 at 4:14 PM

## 2022-11-16 NOTE — DISCHARGE INSTRUCTIONS
[facility] Physical And Pain Medicine  Post Procedure Discharge Instructions        YOU HAVE HAD THE FOLLOWING PROCEDURE:                                  [] Occipital Nerve Blocks  [] CTS wrist injection(s)  [] Knee Injection(s)         [] Shoulder Injection(s)   [] Elbow Injection(s)     [] Botox Injection  [x] Cervical Trigger Point Injections    [] Thoracic Trigger Point Injections    [] Lumbar Trigger Point Injections  [] Piriformis Trigger Point Injections  [] SI Joint Injection(s)     [] Trochanteric Bursa Injection(s)       [] Ankle Injection(s)   [] Plantar Fasciitis   []  ______________  Injection(s) [x] Botox [x]  Migraines [] Spasticity    YOU HAVE RECEIVED THE FOLLOWING MEDICATIONS IN YOUR INJECTION(s)  [x] Lidocaine [x] Bupivacaine   [] DepoMedrol (steroid) [] Decadron (steroid)  []  Kenalog (steroid)   [] Toradol  [] Supartz [] Chastity Basurtouss    [x] Botox        PATIENT INFORMATION:   You may experience the following symptoms after your procedure. These symptoms are normal and should not cause concern: You may have an increase in your pain. This may last 24 - 48 hours after your procedure. You may have no change in the pain that you had prior to your injection(s). You may have weakness or numbness in your affected extremity. If this occurs, this may last until numbing the medication wears off. REPORT THE FOLLOWING SYMPTOMS TO YOUR DOCTOR:  Redness, swelling or drainage at the injection site(s)  Unusual pain that interferes with your normal activities of daily living. OTHER INSTRUCTIONS:    [x] I will apply ice to the injection site(s) for at least 24 hours after the procedure. I will rotate the ice on for 20 minutes and off for 20 minutes for at least 24 hours. [x] I will not apply heat for at least 48 hours and I will not take a hot bath or shower for at least 24 hours.      [x] I understand that if Lidocaine or Bupivacaine was used in my injection(s) that the injection site(s) will be numb for a few hours after the procedure    [x] I understand if a steroid was used it will take effect in 3 - 7 days. I understand that as the numbing medication wears off, the pain may return until the steroids start working. [x] I understand that today I will rest for the next 24 hours and drink plenty of water. [x] For Botox for Migraines please do not wear anything constricting around the forehead for 7-10 days post injection. Examples headband, hats, or bandana    [] For Botox for Spasticity start therapy for the affected limb in two weeks. [] Additional instructions: ______________________________________________ ___________________________________________________________________    Sedation:  Was oral sedation given? [] Yes  [x] No    I understand that if I took an oral nerve calming medication I will not drive for  [] 24 hours after taking the medication.     [x] I have received a copy of my discharge instructions and understand the above instructions to the best of my knowledge    Patient Discharged:  [x] Home  [] Hospital  [] Other  ____________________________________________    Via:  [x] Ambulatory  [] Wheelchair   [] Stretcher [] EMS       Accompanied By:   [] Significant other  [] Family Member  [] Friend   [] Hospital Staff  []  Ambulance Staff  [] Other_______________________________________________    Plan:  [x] Will return to the office in   [] 1 month   [] 3 months for:  [] Procedure Follow-up  [] Office Visit   [x] Planned Procedure      Patient Signature: _____________________________________________________    Staff Signature: _______________________________________________________        If you have questions, problems or concerns you may call [phone] and follow the prompts    SANDRO Cartagena, VA-BC

## 2022-11-16 NOTE — PROGRESS NOTES
Patient states that he/she has ___24___ headaches out of 30 days each month.   Patient states that he/she has ____2__ migraines out of 30 days each month.monthly

## 2022-11-16 NOTE — PROGRESS NOTES
Procedure:  Level of Consciousness: [x]Alert [x]Oriented []Disoriented []Lethargic  Anxiety Level: [x]Calm []Anxious []Depressed []Other  Skin: [x]Warm [x]Dry []Cool []Moist []Intact []Other  Cardiovascular: [x]Palpitations: [x]Never []Occasionally []Frequently  Chest Pain: [x]No []Yes  Respiratory:  [x]Unlabored []Labored []Cough ([] Productive []Unproductive)  HCG Required: [x]No []Yes   Results: []Negative []Positive  Knowledge Level:        [x]Patient/Other verbalized understanding of pre-procedure instructions. [x]Assessment of post-op care needs (transportation, responsible caregiver)        [x]Able to discuss health care problems and how to deal with it. Factors that Affect Teaching:        Language Barrier: [x]No []Yes - why:        Hearing Loss:        [x]No []Yes            Corrective Device:  []Yes [x]No        Vision Loss:           []No [x]Yes            Corrective Device:  [x]Yes []No        Memory Loss:       [x]No []Yes            []Short Term []Long Term  Motivational Level:  [x]Asks Questions                  []Extremely Anxious       [x]Seems Interested               []Seems Uninterested                  [x]Denies need for Education  Risk for Injury:  [x]Patient oriented to person, place and time  []History of frequent falls/loss of balance  Nutritional:  []Change in appetite   []Weight Gain   []Weight Loss  Functional:  []Requires assistance with ADL's      Botox Assessment  [x] Patient  has had a reduction of at least 100 hours (5 Days) in Migraines since receiving Botox  []  []  []  Factors that Affect Teaching:  Assessment of post-op care needs (transportation, responsible caregiver)        []Able to discuss health care problems and how to deal with it.   Factors that Affect Teaching:

## 2022-11-22 ENCOUNTER — TELEPHONE (OUTPATIENT)
Dept: PAIN MANAGEMENT | Age: 47
End: 2022-11-22

## 2022-11-22 DIAGNOSIS — M79.18 MYOFASCIAL PAIN: Primary | ICD-10-CM

## 2022-12-01 DIAGNOSIS — G89.29 CHRONIC BILATERAL LOW BACK PAIN WITHOUT SCIATICA: Chronic | ICD-10-CM

## 2022-12-01 DIAGNOSIS — M54.50 CHRONIC BILATERAL LOW BACK PAIN WITHOUT SCIATICA: Chronic | ICD-10-CM

## 2022-12-02 RX ORDER — HYDROCODONE BITARTRATE AND ACETAMINOPHEN 5; 325 MG/1; MG/1
1 TABLET ORAL 2 TIMES DAILY PRN
Qty: 60 TABLET | Refills: 0 | Status: SHIPPED | OUTPATIENT
Start: 2022-12-07 | End: 2023-01-06

## 2022-12-02 RX ORDER — IBUPROFEN 800 MG/1
800 TABLET ORAL 3 TIMES DAILY PRN
Qty: 90 TABLET | Refills: 2 | Status: SHIPPED | OUTPATIENT
Start: 2022-12-02

## 2022-12-14 ENCOUNTER — HOSPITAL ENCOUNTER (OUTPATIENT)
Dept: PAIN MANAGEMENT | Age: 47
Discharge: HOME | End: 2022-12-14
Payer: MEDICARE

## 2022-12-14 VITALS
HEART RATE: 82 BPM | OXYGEN SATURATION: 100 % | DIASTOLIC BLOOD PRESSURE: 75 MMHG | TEMPERATURE: 96.8 F | SYSTOLIC BLOOD PRESSURE: 106 MMHG | RESPIRATION RATE: 18 BRPM

## 2022-12-14 DIAGNOSIS — M79.18 MYOFASCIAL PAIN: Primary | ICD-10-CM

## 2022-12-14 PROCEDURE — 20611 DRAIN/INJ JOINT/BURSA W/US: CPT

## 2022-12-14 PROCEDURE — 20553 NJX 1/MLT TRIGGER POINTS 3/>: CPT

## 2022-12-14 PROCEDURE — 6360000002 HC RX W HCPCS

## 2022-12-14 PROCEDURE — 2500000003 HC RX 250 WO HCPCS

## 2022-12-14 RX ORDER — BUPIVACAINE HYDROCHLORIDE 5 MG/ML
10 INJECTION, SOLUTION EPIDURAL; INTRACAUDAL ONCE
Status: DISCONTINUED | OUTPATIENT
Start: 2022-12-14 | End: 2022-12-16 | Stop reason: HOSPADM

## 2022-12-14 RX ORDER — TRIAMCINOLONE ACETONIDE 40 MG/ML
80 INJECTION, SUSPENSION INTRA-ARTICULAR; INTRAMUSCULAR ONCE
Status: DISCONTINUED | OUTPATIENT
Start: 2022-12-14 | End: 2022-12-16 | Stop reason: HOSPADM

## 2022-12-14 RX ORDER — LIDOCAINE HYDROCHLORIDE 10 MG/ML
10 INJECTION, SOLUTION EPIDURAL; INFILTRATION; INTRACAUDAL; PERINEURAL ONCE
Status: DISCONTINUED | OUTPATIENT
Start: 2022-12-14 | End: 2022-12-16 | Stop reason: HOSPADM

## 2022-12-14 RX ORDER — LIDOCAINE HYDROCHLORIDE 10 MG/ML
4 INJECTION, SOLUTION EPIDURAL; INFILTRATION; INTRACAUDAL; PERINEURAL ONCE
Status: DISCONTINUED | OUTPATIENT
Start: 2022-12-14 | End: 2022-12-16 | Stop reason: HOSPADM

## 2022-12-14 RX ORDER — BUPIVACAINE HYDROCHLORIDE 5 MG/ML
4 INJECTION, SOLUTION EPIDURAL; INTRACAUDAL ONCE
Status: DISCONTINUED | OUTPATIENT
Start: 2022-12-14 | End: 2022-12-16 | Stop reason: HOSPADM

## 2022-12-14 NOTE — DISCHARGE INSTRUCTIONS
[facility] Physical And Pain Medicine  Post Procedure Discharge Instructions        YOU HAVE HAD THE FOLLOWING PROCEDURE:                                  [] Occipital Nerve Blocks  [] CTS wrist injection(s)  [] Knee Injection(s)         [] Shoulder Injection(s)   [] Elbow Injection(s)     [] Botox Injection  [] Cervical Trigger Point Injections    [] Thoracic Trigger Point Injections    [x] Lumbar Trigger Point Injections  [] Piriformis Trigger Point Injections  [x] SI Joint Injection(s)     [] Trochanteric Bursa Injection(s)       [] Ankle Injection(s)   [] Plantar Fasciitis   []  ______________  Injection(s) [] Botox []  Migraines [] Spasticity    YOU HAVE RECEIVED THE FOLLOWING MEDICATIONS IN YOUR INJECTION(s)  [x] Lidocaine [x] Bupivacaine   [] DepoMedrol (steroid) [] Decadron (steroid)  [x]  Kenalog (steroid)   [] Toradol  [] Supartz [] Maame Helling    [] Botox        PATIENT INFORMATION:   You may experience the following symptoms after your procedure. These symptoms are normal and should not cause concern: You may have an increase in your pain. This may last 24 - 48 hours after your procedure. You may have no change in the pain that you had prior to your injection(s). You may have weakness or numbness in your affected extremity. If this occurs, this may last until numbing the medication wears off. REPORT THE FOLLOWING SYMPTOMS TO YOUR DOCTOR:  Redness, swelling or drainage at the injection site(s)  Unusual pain that interferes with your normal activities of daily living. OTHER INSTRUCTIONS:    [x] I will apply ice to the injection site(s) for at least 24 hours after the procedure. I will rotate the ice on for 20 minutes and off for 20 minutes for at least 24 hours. [x] I will not apply heat for at least 48 hours and I will not take a hot bath or shower for at least 24 hours.      [x] I understand that if Lidocaine or Bupivacaine was used in my injection(s) that the injection site(s) will be numb for a few hours after the procedure    [x] I understand if a steroid was used it will take effect in 3 - 7 days. I understand that as the numbing medication wears off, the pain may return until the steroids start working. [x] I understand that today I will rest for the next 24 hours and drink plenty of water. [] For Botox for Migraines please do not wear anything constricting around the forehead for 7-10 days post injection. Examples headband, hats, or bandana    [] For Botox for Spasticity start therapy for the affected limb in two weeks. [] Additional instructions: ______________________________________________ ___________________________________________________________________    Sedation:  Was oral sedation given? [] Yes  [x] No    I understand that if I took an oral nerve calming medication I will not drive for  [] 24 hours after taking the medication.     [x] I have received a copy of my discharge instructions and understand the above instructions to the best of my knowledge    Patient Discharged:  [x] Home  [] Hospital  [] Other  ____________________________________________    Via:  [x] Ambulatory  [] Wheelchair   [] Stretcher [] EMS       Accompanied By:   [] Significant other  [] Family Member  [] Friend   [] Hospital Staff  []  Ambulance Staff  [x] Other_______________________________________________    Plan:  [] Will return to the office in   [] 1 month   [] 3 months for:  [] Procedure Follow-up  [x] Office Visit   [x] Planned Procedure      Patient Signature: _____________________________________________________    Staff Signature: _______________________________________________________        If you have questions, problems or concerns you may call [phone] and follow the prompts    SANDRO Joseph, VA-BC

## 2022-12-15 NOTE — PROCEDURES
[facility] Physical & Pain Medicine    Patient Name: Shyam Headley    : 1975    Age: 49 y.o. Sex: female    Date: 2022    Pre-op Diagnosis: bilateral  Sacroiliac Joint(s) Dysfunction/ Sacroiliitis    Post-op Diagnosis: bilateral  Sacroiliac Joint(s) Dysfunction/ Sacroliliits    Procedure: Ultrasound Guided Injection of  bilateral Sacroiliac Joint(s)     Performing Procedure:  SANDRO Ivory    Patient Vitals for the past 24 hrs:   BP Temp Temp src Pulse Resp SpO2   22 1052 106/75 96.8 °F (36 °C) Temporal 82 18 100 %       bilateral  Sacroiliac Joint(s)     Description of Procedure:    After voluntary, informed and signed consent obtained the patient was placed in a prone position. Appropriate time out was obtained per policy. The Sacroiliac Joint(s) was palpated for area of maximal tenderness. The area was prepped in an aseptic fashion with CHG swab. The ultrasound transducer was used to confirm the appropriate location. The skin was sprayed with Gebauer's Solution. Under aseptic technique and direct ultrasound visualization a 22 gauge 3 inch spinal needle was introduced into the Sacroiliac Joint(s). After a negative aspiration, a solution of 2 ml of 0.5% Marcaine Plain and 2 ml of 1% Lidocaine Plain and 1 ml of Kenalog (40 mg/ml) was injected into the Sacroiliac Joint(s). The needle was withdrawn and a sterile dressing applied. If this was a bilateral procedure, the same steps were followed on the opposite side.      Pre-op Diagnosis: Myofascial Pain/ Muscle Spasms    Post-op Diagnosis: Myofascial Pain/ Muscle Spasms    Procedure: Lumbar Trigger Point Injections    Performing Procedure: SANDRO Ivory; VA-BC    Patient Vitals for the past 24 hrs:   BP Temp Temp src Pulse Resp SpO2   22 1052 106/75 96.8 °F (36 °C) Temporal 82 18 100 %       Description of Procedure:    After a brief physical assessment and failure to improve with conservative measures the patient presented for Lumbar Trigger Point Injections The indications, limitations and possible complications were discussed with the patient and the patient elected to proceed with the procedure. After voluntary, informed and signed consent obtained the patient was placed in a seated position. Appropriate time out was obtained per policy. The area of maximal tenderness was palpated over the bilaterally Lumbar Muscles - Lower Latissimus,  Erector Spinae, Lumbar Paraspinous. The skin overlying these areas was marked with a skin marker. The areas were prepped using aseptic technique with CHG prep. The skin overlying the proposed injection sites were then sprayed with Gebauer's Solution while protecting patient eyes. Each trigger point of the Lumbar Muscles - Lower Latissimus,  Erector Spinae, Lumbar Paraspinous was injected after negative aspiration was injected with approximately 1-2 ml of a solution of 5 ml of 1% Lidocaine Plain and 5 ml of 0.5% Marcaine Plain after negative aspiration    Discharge: The patient tolerated the procedure well. There were no complications during the procedure and the patient was discharged home with discharge instructions. The patient has been instructed to contact the office should there be any complications or questions to arise between today and their next appointment. Plan:     Will return to the office in 6 weeks for follow up    AMNA Lamb CNP, 12/14/2022 at 9:17 PM

## 2022-12-20 ENCOUNTER — TELEPHONE (OUTPATIENT)
Dept: PAIN MANAGEMENT | Age: 47
End: 2022-12-20

## 2022-12-20 DIAGNOSIS — M79.18 MYOFASCIAL PAIN: Primary | ICD-10-CM

## 2023-01-04 DIAGNOSIS — G89.29 CHRONIC BILATERAL LOW BACK PAIN WITHOUT SCIATICA: Chronic | ICD-10-CM

## 2023-01-04 DIAGNOSIS — M54.50 CHRONIC BILATERAL LOW BACK PAIN WITHOUT SCIATICA: Chronic | ICD-10-CM

## 2023-01-04 RX ORDER — HYDROCODONE BITARTRATE AND ACETAMINOPHEN 5; 325 MG/1; MG/1
1 TABLET ORAL 2 TIMES DAILY PRN
Qty: 60 TABLET | Refills: 0 | Status: SHIPPED | OUTPATIENT
Start: 2023-01-06 | End: 2023-02-05

## 2023-01-04 NOTE — TELEPHONE ENCOUNTER
Patient has an appointment on 2/16/23 with Manuel Mann. Seen by Dr uEceda.Medicated  For pain as requested. On air mattress. Incontinent of B&B.Left great toe CARLEEN . Bactroban applied . No drainage noted . Continued on telemetry and fall risk monitoring No True red alarm ectopy.Hob elevated . Bed alarm on . Teds on right leg  In place.

## 2023-01-31 DIAGNOSIS — M54.50 CHRONIC BILATERAL LOW BACK PAIN WITHOUT SCIATICA: Chronic | ICD-10-CM

## 2023-01-31 DIAGNOSIS — G89.29 CHRONIC BILATERAL LOW BACK PAIN WITHOUT SCIATICA: Chronic | ICD-10-CM

## 2023-02-06 DIAGNOSIS — G43.019 INTRACTABLE MIGRAINE WITHOUT AURA AND WITHOUT STATUS MIGRAINOSUS: Chronic | ICD-10-CM

## 2023-02-06 DIAGNOSIS — G89.29 CHRONIC BILATERAL LOW BACK PAIN WITHOUT SCIATICA: Chronic | ICD-10-CM

## 2023-02-06 DIAGNOSIS — M54.50 CHRONIC BILATERAL LOW BACK PAIN WITHOUT SCIATICA: Chronic | ICD-10-CM

## 2023-02-06 RX ORDER — ONDANSETRON 8 MG/1
8 TABLET, ORALLY DISINTEGRATING ORAL EVERY 8 HOURS PRN
Qty: 90 TABLET | Refills: 0 | Status: SHIPPED | OUTPATIENT
Start: 2023-02-06 | End: 2023-05-07

## 2023-02-06 RX ORDER — BUTALBITAL, ACETAMINOPHEN AND CAFFEINE 50; 325; 40 MG/1; MG/1; MG/1
1 TABLET ORAL DAILY PRN
Qty: 30 TABLET | Refills: 0 | Status: SHIPPED | OUTPATIENT
Start: 2023-02-06

## 2023-02-06 RX ORDER — HYDROCODONE BITARTRATE AND ACETAMINOPHEN 5; 325 MG/1; MG/1
1 TABLET ORAL 2 TIMES DAILY PRN
Qty: 60 TABLET | Refills: 0 | Status: SHIPPED | OUTPATIENT
Start: 2023-02-06 | End: 2023-03-08

## 2023-02-16 ENCOUNTER — HOSPITAL ENCOUNTER (OUTPATIENT)
Dept: PAIN MANAGEMENT | Age: 48
Discharge: HOME | End: 2023-02-16
Payer: MEDICARE

## 2023-02-16 VITALS
HEIGHT: 63 IN | SYSTOLIC BLOOD PRESSURE: 119 MMHG | BODY MASS INDEX: 26.05 KG/M2 | HEART RATE: 91 BPM | RESPIRATION RATE: 16 BRPM | WEIGHT: 147 LBS | TEMPERATURE: 97.7 F | OXYGEN SATURATION: 99 % | DIASTOLIC BLOOD PRESSURE: 73 MMHG

## 2023-02-16 DIAGNOSIS — G89.29 CHRONIC BILATERAL LOW BACK PAIN WITHOUT SCIATICA: Chronic | ICD-10-CM

## 2023-02-16 DIAGNOSIS — M54.50 CHRONIC BILATERAL LOW BACK PAIN WITHOUT SCIATICA: Chronic | ICD-10-CM

## 2023-02-16 PROCEDURE — 99214 OFFICE O/P EST MOD 30 MIN: CPT

## 2023-02-16 RX ORDER — HYDROCODONE BITARTRATE AND ACETAMINOPHEN 5; 325 MG/1; MG/1
1 TABLET ORAL EVERY 8 HOURS PRN
Qty: 90 TABLET | Refills: 0 | Status: SHIPPED | OUTPATIENT
Start: 2023-02-27 | End: 2023-03-29

## 2023-02-16 ASSESSMENT — PAIN DESCRIPTION - LOCATION
LOCATION_2: NECK
LOCATION: BACK

## 2023-02-16 ASSESSMENT — ENCOUNTER SYMPTOMS
CONSTIPATION: 0
VOMITING: 1

## 2023-02-16 ASSESSMENT — PAIN DESCRIPTION - ORIENTATION
ORIENTATION: LOWER
ORIENTATION_2: LOWER

## 2023-02-16 ASSESSMENT — PAIN DESCRIPTION - INTENSITY: RATING_2: 4

## 2023-02-16 ASSESSMENT — PAIN DESCRIPTION - PAIN TYPE
TYPE: CHRONIC PAIN
TYPE_2: CHRONIC PAIN

## 2023-02-16 ASSESSMENT — PAIN SCALES - GENERAL: PAINLEVEL_OUTOF10: 2

## 2023-02-16 NOTE — TELEPHONE ENCOUNTER
1. Chronic bilateral low back pain without sciatica  - HYDROcodone-acetaminophen (NORCO) 5-325 MG per tablet; Take 1 tablet by mouth every 8 hours as needed for Pain for up to 30 days. Max Daily Amount: 3 tablets  Dispense: 90 tablet;  Refill: 0     Refill at office

## 2023-02-16 NOTE — PROGRESS NOTES
[facility]    Office Visit    Patient Name: Jennifer Bueno    MR #: [id number]    Account [de-identified]    : 1975    Age: 49 y.o. Sex: female    Date: 10/27/2022    PCP: Ulises Amor MD    Chief Complaint:   Chief Complaint   Patient presents with    Back Pain     2/10    Neck Pain     /10    Headache     Headache days , migraine days , 8/10       History of Present Illness: The patient is a 49 y.o. female who is for procedure follow-up. Patient had Botox for migraines on     . Had bilateral cervical and thoracic trigger points on     Bilateral lumbar trigger points with bilateral SI injections on         Back Pain  This is a chronic problem. The current episode started more than 1 year ago. The problem occurs intermittently. The problem has been waxing and waning since onset. The pain is present in the lumbar spine and sacro-iliac. The quality of the pain is described as cramping and aching. The symptoms are aggravated by bending, sitting and standing. Associated symptoms include headaches. Neck Pain   This is a chronic problem. The current episode started more than 1 year ago. The problem occurs constantly. The problem has been waxing and waning. The quality of the pain is described as cramping and aching. Associated symptoms include headaches, photophobia and a visual change. Headache   This is a chronic problem. The current episode started more than 1 year ago. The problem occurs daily. The problem has been waxing and waning (recently patient is having more migraines and she has one at this appointment). The pain is located in the Bilateral, occipital and frontal region. The quality of the pain is described as throbbing, squeezing, aching and stabbing. Associated symptoms include back pain, blurred vision, nausea, neck pain, phonophobia, photophobia, scalp tenderness, a visual change and vomiting.  The symptoms are aggravated by emotional stress, activity, bright light and noise. Screening Tools:    PEG Score: ***     Last PEG Score: 2.3     Annual ORT Score: 2     Annual PHQ Score: 13    Current Pain Assessment  Pain Assessment  Pain Assessment: 0-10  Pain Level: 2  Pain Location: Back  Pain Orientation: Lower  Pain Type: Chronic pain    Past Visit HPI:  10/2022  Patient is having a increase in migraines on the last two weeks of her botox cycle and the first two weeks post injection. This is common due to the half life of the medication. 4/14/2022  Patient has been having more migraines in the past month or two. She states that she is having at least 3 migraines per month. She cannot identify any specific trigger. She states that the Imitrex does not help at all anymore. She tried Ubrevly and she did have some relief. 3/1/2021  presents for follow up of procedure. Patient had Lumbar trigger point injections on 11/18/2020, Cervical and thoracic trigger point injections on 1/6/2021, and Botox for Migraines on 1/13/2021. Patient had at least 85% relief of pain from procedure(s) for at least 6 weeks and was able to increase activity after procedure. Patient received enough pain relief from injections that the patient would like to repeat the injection(s). Patient states that she does not have to use abortive migraine medications until the last two to four weeks prior to Botox injections and sometimes the two weeks after receiving injections. Patient will take Fioricet first especially is she is not at home then she will take Imitrex. The Imitrex causes her to be very drowsy. Patient is behind on lumbar injections due to providers scheduled and her low back is bothering her a little more right now. 11/10/2020  presents for procedure follow-up. Patient had bilateral SI joint injections with lumbar trigger points on 7/1/2020, patient had cervical and thoracic trigger points on 7/15/2020, and patient had Botox injections on 9/2/2020. Patient had at least 85% relief of pain from procedure(s) for at least 6 weeks and was able to increase activity after procedure. Patient received enough pain relief from injections that the patient would like to repeat the injection(s).    6/22/2020  presents to the office for annual exam with primary complaints of chronic headaches and back pain. Patient was referred to this office last April 2018 with a past history of chiari malformation. Patient had a posterior fossa decompression and her pain had pretty much resolved for approximately 3 years. However during the year prior to coming back to this office patient started developing pain again. She started having chronic headaches, along with myofascial pain in the shoulders and thoracic spine. Through the years patient has been on numerous medications to treat headaches including the following but not limited to Topamax, amitriptyline, propranolol, Imitrex, gabapentin, and Lyrica, Fioricet does help with the intense headaches but he does not make headaches tolerable. She does follow with neurology and she has been started on Aimovig which helps. Patient had been getting bilateral occipital nerve blocks along with trigger point injections in her cervical, thoracic, and lumbar spine. Patient was changed to botox injections in October 2019. Patient has had excellent results from botox injections. Patient did have a flare up of low back pain. She has had SI injections which have made low back pain improve.      Past Medical History  Past Medical History:   Diagnosis Date    ADHD (attention deficit hyperactivity disorder)     Anemia     Anxiety     Cervicalgia     Chiari malformation type I ([facility] [address]     s/p decompression 2014    Chronic back pain     Depression     Headache(784.0)     Hiatal hernia     Hyperlipidemia     Indigestion     Migraines     Obstructive sleep apnea     Mild with an AHI:  7.5 and  RDI:  14.7       Medications  Current Outpatient Medications Medication Sig Dispense Refill    ibuprofen (ADVIL;MOTRIN) 800 MG tablet Take 1 tablet by mouth 3 times daily as needed for Pain 90 tablet 2    HYDROcodone-acetaminophen (NORCO) 5-325 MG per tablet Take 1 tablet by mouth 2 times daily as needed for Pain for up to 30 days. 60 tablet 0    butalbital-acetaminophen-caffeine (FIORICET, ESGIC) -40 MG per tablet Take 1 tablet by mouth daily as needed for Headaches (may repeat in 4 hours x 1 no more than 2 per day) 30 tablet 0    ondansetron (ZOFRAN-ODT) 8 MG TBDP disintegrating tablet Place 1 tablet under the tongue every 8 hours as needed for Nausea or Vomiting 90 tablet 0    DULoxetine (CYMBALTA) 30 MG extended release capsule Take 30 mg by mouth daily      amphetamine-dextroamphetamine (ADDERALL) 20 MG tablet Take 20 mg by mouth daily. lidocaine (LIDODERM) 5 % Place 3 patches onto the skin daily 12 hours on, 12 hours off. 30 patch 2    Ubrogepant (UBRELVY) 100 MG TABS Take 1 tablet by mouth daily as needed (Migraine may repeat x 1 within 4 hours) Take 1 tablet at the start of a migraine. May repeat x1 within 4 hours. Do not exceed 9 days per month 16 tablet 5    traZODone (DESYREL) 150 MG tablet Take 150 mg by mouth daily      ALPRAZolam (XANAX) 1 MG tablet Take 1 mg by mouth 2 times daily as needed. lamoTRIgine (LAMICTAL) 200 MG tablet Take 200 mg by mouth nightly      zolpidem (AMBIEN) 10 MG tablet Take 1 tablet by mouth nightly. .  2     No current facility-administered medications for this encounter. Allergies  Ultram [tramadol]    Current Medications  Current Outpatient Medications   Medication Sig Dispense Refill    ibuprofen (ADVIL;MOTRIN) 800 MG tablet Take 1 tablet by mouth 3 times daily as needed for Pain 90 tablet 2    HYDROcodone-acetaminophen (NORCO) 5-325 MG per tablet Take 1 tablet by mouth 2 times daily as needed for Pain for up to 30 days.  60 tablet 0    butalbital-acetaminophen-caffeine (FIORICET, ESGIC) -40 MG per tablet Take 1 tablet by mouth daily as needed for Headaches (may repeat in 4 hours x 1 no more than 2 per day) 30 tablet 0    ondansetron (ZOFRAN-ODT) 8 MG TBDP disintegrating tablet Place 1 tablet under the tongue every 8 hours as needed for Nausea or Vomiting 90 tablet 0    DULoxetine (CYMBALTA) 30 MG extended release capsule Take 30 mg by mouth daily      amphetamine-dextroamphetamine (ADDERALL) 20 MG tablet Take 20 mg by mouth daily. lidocaine (LIDODERM) 5 % Place 3 patches onto the skin daily 12 hours on, 12 hours off. 30 patch 2    Ubrogepant (UBRELVY) 100 MG TABS Take 1 tablet by mouth daily as needed (Migraine may repeat x 1 within 4 hours) Take 1 tablet at the start of a migraine. May repeat x1 within 4 hours. Do not exceed 9 days per month 16 tablet 5    traZODone (DESYREL) 150 MG tablet Take 150 mg by mouth daily      ALPRAZolam (XANAX) 1 MG tablet Take 1 mg by mouth 2 times daily as needed. lamoTRIgine (LAMICTAL) 200 MG tablet Take 200 mg by mouth nightly      zolpidem (AMBIEN) 10 MG tablet Take 1 tablet by mouth nightly. .  2     No current facility-administered medications for this encounter. Social History    Social History     Socioeconomic History    Marital status:      Spouse name: None    Number of children: 2    Years of education: None    Highest education level: None   Tobacco Use    Smoking status: Never    Smokeless tobacco: Never   Vaping Use    Vaping Use: Never used   Substance and Sexual Activity    Alcohol use: No    Drug use: No    Sexual activity: Yes     Partners: Male         Family History  family history includes Cancer in her maternal aunt and another family member; High Blood Pressure in her father and sister. Review of Systems:  Review of Systems   Constitutional:  Positive for activity change. Eyes:  Positive for blurred vision, photophobia and visual disturbance. Gastrointestinal:  Positive for nausea and vomiting. Negative for constipation. Musculoskeletal:  Positive for arthralgias, back pain, myalgias, neck pain and neck stiffness. Neurological:  Positive for headaches. Psychiatric/Behavioral:  Positive for sleep disturbance. Negative for agitation, self-injury and suicidal ideas. The patient is not nervous/anxious. 14 point ROS negative besides that noted in HPI    Physical exam:     Patient Vitals for the past 24 hrs:   BP Temp Temp src Pulse Resp SpO2 Height Weight   02/16/23 1514 119/73 97.7 °F (36.5 °C) Temporal 91 16 99 % 5' 3\" (1.6 m) 147 lb (66.7 kg)         Body mass index is 26.04 kg/m². Physical Exam  Vitals and nursing note reviewed. Constitutional:       General: She is not in acute distress. Appearance: She is well-developed. HENT:      Head: Normocephalic. Right Ear: External ear normal.      Left Ear: External ear normal.      Nose: Nose normal.   Eyes:      Conjunctiva/sclera: Conjunctivae normal.      Pupils: Pupils are equal, round, and reactive to light. Cardiovascular:      Rate and Rhythm: Normal rate and regular rhythm. Pulmonary:      Effort: Pulmonary effort is normal.      Breath sounds: Normal breath sounds. Abdominal:      General: Bowel sounds are normal.      Palpations: Abdomen is soft. Musculoskeletal:      Cervical back: Normal range of motion and neck supple. Spasms (Noted to have tender palpable knots noted in muscles ) and tenderness present. Pain with movement present. Thoracic back: Spasms (Noted to have tender palpable knots noted in muscles ) and tenderness present. Lumbar back: Spasms (Noted to have tender palpable knots noted in muscles ) and tenderness (Positive Bilateral  SI tenderness noted. 3 + provacative test) present. Skin:     General: Skin is warm and dry. Neurological:      Mental Status: She is alert and oriented to person, place, and time. Cranial Nerves: No cranial nerve deficit. Sensory: No sensory deficit.       Deep Tendon Reflexes: Reflexes are normal and symmetric. Psychiatric:         Behavior: Behavior normal.         Thought Content: Thought content normal.         Judgment: Judgment normal.       LABS:     Lab Results   Component Value Date/Time     08/27/2011 05:15 PM    K 4.0 08/27/2011 05:15 PM     08/27/2011 05:15 PM    CO2 32 08/27/2011 05:15 PM    BUN 10 08/27/2011 05:15 PM    CREATININE 0.8 08/27/2011 05:15 PM    GLUCOSE 94 08/27/2011 05:15 PM    CALCIUM 9.3 08/27/2011 05:15 PM        Lab Results   Component Value Date    WBC 6.87 08/27/2011    HGB 12.6 08/27/2011    HCT 38.0 08/27/2011    MCV 91.1 08/27/2011     08/27/2011       Assessment:                                                                                                                                        Active Problems:    Cervical paraspinal muscle spasm    Spasm of muscle of lower back    Chronic pain of both knees    Restless leg syndrome    Chiari malformation type I (HCC)    Intractable migraine without aura and without status migrainosus    Myofascial pain    SI (sacroiliac) joint dysfunction    Chronic bilateral low back pain without sciatica    Pain management contract agreement  Resolved Problems:    * No resolved hospital problems. *      PLAN:  Medications:   Intractable migraine without aura and without status migrainosus    Continue medication with no refill sent at appointment see refill encounter. butalbital-acetaminophen-caffeine (FIORICET, ESGIC) -40 MG per tablet; Take 1 tablet by mouth daily as needed for Headaches (may repeat in 4 hours x 1 no more than 2 per day)  Dispense: 30 tablet; Refill: 2    - Atogepant (QULIPTA) 60 MG TABS; Take 1 tablet by mouth daily  Dispense: 30 tablet; Refill: 2  - Ubrogepant (UBRELVY) 100 MG TABS; Take 1 tablet by mouth daily as needed (Migraine may repeat x 1 within 4 hours) Take 1 tablet at the start of a migraine. May repeat x1 within 4 hours.  Do not exceed 9 days per month Dispense: 16 tablet; Refill: 5     Stop Nurtec and start [place]. Patient can take daily preventative medication and use abortive medication coupled with Botox patient should to complete migraine freedom. Had used Ubrelvy in the past which had helped tried to use Nurtec as preventative and abortive but patient needed better combination. Patient has failed triptans in the past. Patient has tried [place] samples and she achieved significant results with the [place]. She had almost complete freedom of migraines and headaches. However patient is having difficult with insurance covering the [place]. Chronic bilateral low back pain without sciatica  - HYDROcodone-acetaminophen (NORCO) 5-325 MG per tablet; Take 1 tablet by mouth 2 times daily as needed for Pain for up to 30 days. Dispense: 60 tablet; Refill: 0     2. Intractable migraine without aura and without status migrainosus  - Atogepant (QULIPTA) 60 MG TABS; Take 1 tablet by mouth daily  Dispense: 30 tablet; Refill: 5     Continue medication with no refill sent at appointment see refill encounter. Ibuprofen (Advil) 800 mg TID prn # 90 with 2 RF's    Continue medication with no refill sent at appointment see refill encounter. Lidocaine (lidoderm) 5% 1-3 patches transdermal daily # 30 2 RF's    Treatments:   Continue Botox for migraine. Patient obtained 50% reduction in mean headache days per month with a decrease in severity, intensity, and aggravating symptoms as compared with pretreatment and patient had over 100 hours of migraine freedom per month after Botox    Repeat with cervical and thoracic trigger point injections for myofascial pain as patient has had good response in the past. These work best if given same day as botox. Repeat with lumbar trigger point injections for myofacial pain as patient has had a good response to these injections in the past. These work best if given same day as SI joint injections.      Repeat with bilateral SI joint injections with ultrasound for SI joint dysfunction and chronic low back pain as patient has had good response with these before. Order placed for NexWave 3 in 1 device. Patient suffers from SI joint dysfunction. Chronic low back pain, muscle spasms of neck and low back. Zynex NexWave is a prescription only 3-in-1 device with IFC, TENS, and NMES. The device is used to help manage their pain symptoms, re-educate and strengthen muscles, and or hopefully reduce or eliminate their need for pain medications. IFC can help extend pain relief and hopefully reduce need for pain medication. TENS is used to control break through pain. NMES is used to help with muscle spasms and strengthen weak muscles. Electrotherapy is effective and can increase activity levels by over 60% and has shown, in some patients, a reduction of pain medication usage by 50% (1). Vanesa DA, Apryl C, Nia A, Ary Yu (1996) Transcutaneous Electric Nerve Stimulation Treatment Outcome in Long-Term Users. Clinical Journal of Pain, 12(3), 1996, p.201      [x] Follow up    [] 4 weeks   [x] 6-8 weeks   [] 10-12 weeks   [] 3 months  [x] Post procedure to evaluate effectiveness of treatment  [] To evaluate medications changes made at office visit. [] To review diagnostics ordered at last visit. [] To evaluate response to therapy    [x] For management of controlled substance  [x] Random UDS as indicated by ORT score or if indicated by abberent behaviors    Discussion: Discussed exam findings and plan of care with patient. Patient agreed with POC and questions were asked and answered. Activity: discussed exercise as beneficial to pain reduction, encouraged stretching exercise with a focus on torso strengthening.     Goals:  Pain Management Goals of Therapy:   [] Resolution in pain  [x] Decrease in pain level  [x] Improvement in ADL's  [x] Increase in activities with less pain  [] Decrease in medication Controlled substance monitoring:    [x] Discussed medication side effects, risk of tolerance and/or dependence, and/or alternative treatment  [] Discussed the detrimental effects of long term narcotic use in younger patients especially women of childbearing years. [x] No signs and symptoms of potential drug abuse or diversion were identified  [] Signs of potential drug abuse or diversion were identified   [] ORT Score   [] UDS non-compliant   [] See Note  [] Random urine drug screen sent today   [x] Random urine drug screen not completed today   [] Deferred New Patient  [x] Compliant 4/2022 saliva  [] Not Compliant see note  [] Medication agreement with provider signed today 4/2022  [x] Medication agreement with provider on file under media   [] Medication regimen effective with c/o of side effects noted and continued   [] New patient continuing current medication while developing POC   [x] On going assessment and evaluation of medication regimen  [] Medication regimen not effective see plan for changes  [x] Panchito Ludwig reviewed & on file under media     CC: MD Blayne Willis, AMNA - CNP, 2/16/2023 at 3:41 PM    EMR dragon/transcription disclaimer: Much of this encounter note is electronic transcription/translation of spoken language to printed tach. Electronic translation of spoken language may be erroneous, or at times, nonsensical words or phrases may be inadvertently transcribed.  Although, I have reviewed the note for such errors, some may still exist.

## 2023-02-16 NOTE — PROGRESS NOTES
Clinic Documentation      Education Provided:  [x] Review of Lora Villarreal  [] Agreement Review  [x] PEG Score Calculated [] PHQ Score Calculated [] ORT Score Calculated    [] Compliance Issues Discussed [] Cognitive Behavior Needs [x] Exercise [] Review of Test [] Financial Issues  [x] Tobacco/Alcohol Use Reviewed [x] Teaching [] New Patient [] Picture Obtained    Physician Plan:  [] Outgoing Referral  [] Pharmacy Consult  [x] Test Ordered [x] Prescription Ordered/Changed   [] Obtained Test Results / Consult Notes        Complete if patient is withholding blood thinner for procedure     Blood Thinner Patient is currently taking:      [] Plavix (Hold for 7 days)  [] Aspirin (Hold for 5 days)     [] Pletal (Hold for 2 days)  [] Pradaxa (Hold for 3 days)    [] Effient (Hold for 7 days)  [] Xarelto (Hold for 2 days)    [] Eliquis (Hold for 2 days)  [] Brilinta (Hold for 7 days)    [] Coumadin (Hold for 5 days) - (INR needs to be drawn the day prior to procedure- INR < 2.0)    [] Aggrenox (Hold for 7 days)        [] Patient will stop medication on their own.    [] Blood Thinner Form Faxed for approval to hold.    Provider form faxed to:     Assessment Completed by:  Electronically signed by Ricky Wikls RN on 2/16/2023 at 4:13 PM

## 2023-03-01 DIAGNOSIS — M54.50 CHRONIC BILATERAL LOW BACK PAIN WITHOUT SCIATICA: Chronic | ICD-10-CM

## 2023-03-01 DIAGNOSIS — G89.29 CHRONIC BILATERAL LOW BACK PAIN WITHOUT SCIATICA: Chronic | ICD-10-CM

## 2023-03-01 DIAGNOSIS — G43.019 INTRACTABLE MIGRAINE WITHOUT AURA AND WITHOUT STATUS MIGRAINOSUS: Chronic | ICD-10-CM

## 2023-03-08 ENCOUNTER — HOSPITAL ENCOUNTER (OUTPATIENT)
Dept: PAIN MANAGEMENT | Age: 48
Discharge: HOME | End: 2023-03-08
Payer: MEDICARE

## 2023-03-08 VITALS
RESPIRATION RATE: 18 BRPM | DIASTOLIC BLOOD PRESSURE: 70 MMHG | TEMPERATURE: 97.2 F | SYSTOLIC BLOOD PRESSURE: 112 MMHG | HEART RATE: 88 BPM | OXYGEN SATURATION: 100 %

## 2023-03-08 DIAGNOSIS — M43.6 ANTEROCOLLIS: ICD-10-CM

## 2023-03-08 DIAGNOSIS — M62.838 MUSCLE SPASTICITY: ICD-10-CM

## 2023-03-08 PROCEDURE — 95874 GUIDE NERV DESTR NEEDLE EMG: CPT

## 2023-03-08 PROCEDURE — 2580000003 HC RX 258

## 2023-03-08 PROCEDURE — 64646 CHEMODENERV TRUNK MUSC 1-5: CPT

## 2023-03-08 PROCEDURE — A4216 STERILE WATER/SALINE, 10 ML: HCPCS

## 2023-03-08 PROCEDURE — 6360000002 HC RX W HCPCS

## 2023-03-08 PROCEDURE — 64615 CHEMODENERV MUSC MIGRAINE: CPT

## 2023-03-08 RX ORDER — SODIUM CHLORIDE 9 MG/ML
9 INJECTION INTRAVENOUS ONCE
Status: DISCONTINUED | OUTPATIENT
Start: 2023-03-08 | End: 2023-03-10 | Stop reason: HOSPADM

## 2023-03-08 NOTE — PROGRESS NOTES
Patient states that he/she has ___3___ headaches out of 30 days each month.   Patient states that he/she has ___2___ migraines out of 30 days each month.monthly

## 2023-03-08 NOTE — DISCHARGE INSTRUCTIONS
[address] [place] [address] Physical And Pain Medicine  Post Procedure Discharge Instructions        YOU HAVE HAD THE FOLLOWING PROCEDURE:                                  [] Occipital Nerve Blocks  [] CTS wrist injection(s)  [] Knee Injection(s)         [] Shoulder Injection(s)   [] Elbow Injection(s)     [] Botox Injection  [] Cervical Trigger Point Injections    [] Thoracic Trigger Point Injections    [] Lumbar Trigger Point Injections  [] Piriformis Trigger Point Injections  [] SI Joint Injection(s)     [] Trochanteric Bursa Injection(s)       [] Ankle Injection(s)   [] Plantar Fasciitis   []  ______________  Injection(s) [x] Botox [x]  Migraines [x] Spasticity    YOU HAVE RECEIVED THE FOLLOWING MEDICATIONS IN YOUR INJECTION(s)  [] Lidocaine [] Bupivacaine   [] DepoMedrol (steroid) [] Decadron (steroid)  []  Kenalog (steroid)   [] Toradol  [] Supartz [] Marcellus Dun    [x] Botox        PATIENT INFORMATION:   You may experience the following symptoms after your procedure. These symptoms are normal and should not cause concern: You may have an increase in your pain. This may last 24 - 48 hours after your procedure. You may have no change in the pain that you had prior to your injection(s). You may have weakness or numbness in your affected extremity. If this occurs, this may last until numbing the medication wears off. REPORT THE FOLLOWING SYMPTOMS TO YOUR DOCTOR:  Redness, swelling or drainage at the injection site(s)  Unusual pain that interferes with your normal activities of daily living. OTHER INSTRUCTIONS:    [x] I will apply ice to the injection site(s) for at least 24 hours after the procedure. I will rotate the ice on for 20 minutes and off for 20 minutes for at least 24 hours. [x] I will not apply heat for at least 48 hours and I will not take a hot bath or shower for at least 24 hours.      [] I understand that if Lidocaine or Bupivacaine was used in my injection(s) that the injection site(s) will be numb for a few hours after the procedure    [] I understand if a steroid was used it will take effect in 3 - 7 days. I understand that as the numbing medication wears off, the pain may return until the steroids start working. [x] I understand that today I will rest for the next 24 hours and drink plenty of water. [x] For Botox for Migraines please do not wear anything constricting around the forehead for 7-10 days post injection. Examples headband, hats, or bandana    [x] For Botox for Spasticity start therapy for the affected limb in two weeks. [] Additional instructions: ______________________________________________ ___________________________________________________________________    Sedation:  Was oral sedation given? [] Yes  [x] No    I understand that if I took an oral nerve calming medication I will not drive for  [] 24 hours after taking the medication.     [x] I have received a copy of my discharge instructions and understand the above instructions to the best of my knowledge    Patient Discharged:  [x] Home  [] Hospital  [] Other  ____________________________________________    Via:  [x] Ambulatory  [] Wheelchair   [] Stretcher [] EMS       Accompanied By:   [] Significant other  [] Family Member  [] Friend   [] Hospital Staff  []  Ambulance Staff  [] Other_______________________________________________    Plan:  [x] Will return to the office in   [] 1 month   [] 3 months for:  [] Procedure Follow-up  [] Office Visit   [] Planned Procedure      Patient Signature: _____________________________________________________    Staff Signature: _______________________________________________________        If you have questions, problems or concerns you may call [phone] and follow the prompts    SANDRO Garcia, VA-BC

## 2023-03-09 NOTE — PROCEDURES
[address]    Patient Name: Eunice Schneider    : 1975                    Age: 51 y.o. Sex: female    Date: 2023    Pre-op Diagnosis: Chronic Migraines/Cervical dystonia    Post-op Diagnosis: Chronic Migraines/Cervical dystonia    Procedure: Botox injections x *** units for migraines and *** for CD (*** units wasted)    Performing Procedure: Ari Needle, APRN    Patient Vitals for the past 24 hrs:   BP Temp Temp src Pulse Resp SpO2   23 1324 112/70 97.2 °F (36.2 °C) Temporal 88 18 100 %       Previous Injections:  Patient had 3/2 headaches/migraines over the past month. Indications:    Eunice Schneider has been treated for problems with chronic migraine headaches. The patient was taken through a conservative course of treatment without complete resolution of symptoms. Botox injection therapy was offered and after the risks and benefits of the procedure were explained to the patient, we had agreed to proceed. The patient was taken to the procedure room and placed in the seated position. The following muscles were identified using palpation and standard landmarks. These muscles were marked and prepped with alcohol. A total of 125 units were injected after careful aspiration and the following distribution bilaterally:  10 units in 2 sites, procerus 5 units in one site, frontalis 20 units in 4 sites, temporalis 40 unit in 8 sites, occipitals 30 units in 6 sites, and cervical paraspinals 20 units in 4 sites    The following muscles were identified using landmark location and palpation of muscle tightness. {RIGHT/LEFT:12::\"bilaterally\"} Cervical muscles - Splenius Capitis, Splenius Cervicis, Sternocleidomastoid, Scalene Complex, Semispinalis Capitis, Rectus Capitis Posterior Major/Minor, Trapezius, Longissimus, Levator Scapulae. The skin overlying these areas were marked with a skin marker. The areas were prepped using alcohol.  A total of *** units were injected after careful aspiration and the following distribution: {Right/left:04933::\"bilateral\",\"left\",\"right\"} Splenius Capitis {Numbers; 0-100:79084::\"100\"} units in {NUMBER 1-10:7372636684} separate sites; {Right/left:61476::\"bilateral\",\"left\",\"right\"} Splenius Cervicis {Numbers; 0-100:67317::\"100\"} units in {NUMBER 1-10:9969101540} separate sites; {Right/left:04046::\"bilateral\",\"left\",\"right\"} Sternocleidomastoid {Numbers; 0-100:28250::\"100\"} units in {NUMBER 1-10:5043535947} separate sites;, {Right/left:30910::\"bilateral\",\"left\",\"right\"} Scalene Complex {Numbers; 0-100:79331::\"100\"} units in {NUMBER 1-10:6825011878} separate sites; {Right/left:07553::\"bilateral\",\"left\",\"right\"} Semispinalis Capitis {Numbers; 0-100:42105::\"100\"} units in {NUMBER 1-10:0389751141} separate sites; {Right/left:17695::\"bilateral\",\"left\",\"right\"} Rectus Capitis Posterior Major/Minor {Numbers; 0-100:65048::\"100\"} units in {NUMBER 1-10:0461971314} separate sites; {Right/left:21190::\"bilateral\",\"left\",\"right\"} Trapezius {Numbers; 0-100:73688::\"100\"} units in {NUMBER 1-10:2808286018} separate sites; Longissimus {Numbers; 0-100:45153::\"100\"} units in {NUMBER 1-10:3245782367} separate sites; {Right/left:84449::\"bilateral\",\"left\",\"right\"} Levator Scapulae {Numbers; 0-100:05037::\"100\"} units in {NUMBER 1-10:7111629793} separate sites;  EMG was used to confirm targeted muscle was injected. Discharge: The patient tolerated the procedure well. There were no complications during the procedure and the patient was discharged home with discharge instructions. The patient has been instructed to contact the office should there be any complications or questions to arise between today and their next appointment. Plan: Will return to the office for office visit in 4 - 6 weeks for follow up and office visit.     AMNA Potts - CNP, 3/8/2023 at 9:32 PM

## 2023-03-15 ENCOUNTER — HOSPITAL ENCOUNTER (OUTPATIENT)
Dept: PAIN MANAGEMENT | Age: 48
Discharge: HOME | End: 2023-03-15
Payer: MEDICARE

## 2023-03-15 ENCOUNTER — TELEPHONE (OUTPATIENT)
Dept: PAIN MANAGEMENT | Age: 48
End: 2023-03-15

## 2023-03-15 VITALS
SYSTOLIC BLOOD PRESSURE: 117 MMHG | HEART RATE: 83 BPM | OXYGEN SATURATION: 100 % | TEMPERATURE: 97.9 F | RESPIRATION RATE: 18 BRPM | DIASTOLIC BLOOD PRESSURE: 67 MMHG

## 2023-03-15 DIAGNOSIS — M79.18 MYOFASCIAL PAIN: Primary | ICD-10-CM

## 2023-03-15 PROCEDURE — 6360000002 HC RX W HCPCS

## 2023-03-15 PROCEDURE — 2500000003 HC RX 250 WO HCPCS

## 2023-03-15 PROCEDURE — 20611 DRAIN/INJ JOINT/BURSA W/US: CPT

## 2023-03-15 RX ORDER — LIDOCAINE HYDROCHLORIDE 10 MG/ML
4 INJECTION, SOLUTION EPIDURAL; INFILTRATION; INTRACAUDAL; PERINEURAL ONCE
Status: DISCONTINUED | OUTPATIENT
Start: 2023-03-15 | End: 2023-03-17 | Stop reason: HOSPADM

## 2023-03-15 NOTE — DISCHARGE INSTRUCTIONS
[address] [place] [address] Physical And Pain Medicine  Post Procedure Discharge Instructions        YOU HAVE HAD THE FOLLOWING PROCEDURE:                                  [] Occipital Nerve Blocks  [] CTS wrist injection(s)  [x] Knee Injection(s)         [] Shoulder Injection(s)   [] Elbow Injection(s)     [] Botox Injection  [] Cervical Trigger Point Injections    [] Thoracic Trigger Point Injections    [] Lumbar Trigger Point Injections  [] Piriformis Trigger Point Injections  [] SI Joint Injection(s)     [] Trochanteric Bursa Injection(s)       [] Ankle Injection(s)   [] Plantar Fasciitis   []  ______________  Injection(s) [] Botox []  Migraines [] Spasticity    YOU HAVE RECEIVED THE FOLLOWING MEDICATIONS IN YOUR INJECTION(s)  [x] Lidocaine [] Bupivacaine   [] DepoMedrol (steroid) [] Decadron (steroid)  []  Kenalog (steroid)   [] Toradol  [] Supartz [x] Gerre Lambing    [] Botox        PATIENT INFORMATION:   You may experience the following symptoms after your procedure. These symptoms are normal and should not cause concern: You may have an increase in your pain. This may last 24 - 48 hours after your procedure. You may have no change in the pain that you had prior to your injection(s). You may have weakness or numbness in your affected extremity. If this occurs, this may last until numbing the medication wears off. REPORT THE FOLLOWING SYMPTOMS TO YOUR DOCTOR:  Redness, swelling or drainage at the injection site(s)  Unusual pain that interferes with your normal activities of daily living. OTHER INSTRUCTIONS:    [x] I will apply ice to the injection site(s) for at least 24 hours after the procedure. I will rotate the ice on for 20 minutes and off for 20 minutes for at least 24 hours. [x] I will not apply heat for at least 48 hours and I will not take a hot bath or shower for at least 24 hours.      [x] I understand that if Lidocaine or Bupivacaine was used in my injection(s) that the injection site(s) will be numb for a few hours after the procedure    [] I understand if a steroid was used it will take effect in 3 - 7 days. I understand that as the numbing medication wears off, the pain may return until the steroids start working. [x] I understand that today I will rest for the next 24 hours and drink plenty of water. [] For Botox for Migraines please do not wear anything constricting around the forehead for 7-10 days post injection. Examples headband, hats, or bandana    [] For Botox for Spasticity start therapy for the affected limb in two weeks. [] Additional instructions: ______________________________________________ ___________________________________________________________________    Sedation:  Was oral sedation given? [] Yes  [x] No    I understand that if I took an oral nerve calming medication I will not drive for  [] 24 hours after taking the medication.     [x] I have received a copy of my discharge instructions and understand the above instructions to the best of my knowledge    Patient Discharged:  [x] Home  [] Hospital  [] Other  ____________________________________________    Via:  [x] Ambulatory  [] Wheelchair   [] Stretcher [] EMS       Accompanied By:   [] Significant other  [] Family Member  [] Friend   [] Hospital Staff  []  Ambulance Staff  [x] Other_______________________________________________    Plan:  [] Will return to the office in   [] 1 month   [] 3 months for:  [] Procedure Follow-up  [x] Office Visit   [x] Planned Procedure      Patient Signature: _____________________________________________________    Staff Signature: _______________________________________________________        If you have questions, problems or concerns you may call [phone] and follow the prompts    Gerhardt Fleck, ACAGNP, VA-BC

## 2023-03-15 NOTE — PROCEDURES
[facility] Physical & Pain Medicine    Patient Name: Jose Luis Lima    : 1975    Age: 51 y.o. Sex: female    Date: March 15, 2023    Preop Diagnosis: Osteoarthritis of bilateral Knee(s)    Postop Diagnosis: Osteoarthritis of  bilateral  Knee(s)      Procedure: Injection of  bilateral  Knee(s) with US confirmation    Performing Procedure: Urban Korina, AGACNP-BC    Patient Vitals for the past 24 hrs:   BP Temp Temp src Pulse Resp SpO2   03/15/23 1327 117/67 97.9 °F (36.6 °C) Temporal 83 18 100 %       Description of Procedure:    After a brief physical assessment and failure to improve with conservative measures the patient presented for an injection of the bilateral Knee(s). The indications, limitations and possible complications were discussed with the patient and the patient elected to proceed with the procedure. bilateral Knee(s)    After voluntary, informed and signed consent obtained the patient was placed in a seated position. Appropriate time out was obtained per policy. The knee was palpated at the anterior aspect of the knee to locate the patellar tendon. The projected injection site is approximately 1 cm medially or laterally of the midpoint of the tendon. Usually, there is a depression. The area was marked. The ultrasound transducer was used to confirm the appropriate location. The skin prepped in an aseptic fashion with CHG swab and then sprayed with Gebauer's Solution. Under aseptic technique 22 gauge 1 1/2 inch needle was then introduced into the Knee(s). After a negative aspiration, 1 ml of 1% Lidocaine Plain was injected into the knee prior to the 5 ml reconstituted Zilretta, which consists of  4 ml prepackaged solution, mixed with Zilretta (32mg/ml) constitute and this was injected into the Knee(s). The needle was withdrawn and a sterile dressing applied. If this was a bilateral procedure the same steps were followed on the opposite side. Discharge:   The patient tolerated the procedure well. There were no complications during the procedure and the patient was discharged home with discharge instructions. The patient has been instructed to contact the office should there be any complications or questions to arise between today and their next appointment. Plan:    The patient will follow up in the office in approximately 6 weeks.      AMNA Shine - CNP, 3/15/2023 at 2:01 PM

## 2023-03-19 PROBLEM — Z51.81 ENCOUNTER FOR MONITORING OPIOID MAINTENANCE THERAPY: Status: ACTIVE | Noted: 2023-03-19

## 2023-03-19 PROBLEM — Z79.891 ENCOUNTER FOR MONITORING OPIOID MAINTENANCE THERAPY: Status: ACTIVE | Noted: 2023-03-19

## 2023-03-19 ASSESSMENT — ENCOUNTER SYMPTOMS
PHOTOPHOBIA: 1
BACK PAIN: 1
SCALP TENDERNESS: 1
BLURRED VISION: 1
NAUSEA: 1
VISUAL CHANGE: 1

## 2023-03-27 DIAGNOSIS — M54.50 CHRONIC BILATERAL LOW BACK PAIN WITHOUT SCIATICA: Chronic | ICD-10-CM

## 2023-03-27 DIAGNOSIS — G89.29 CHRONIC BILATERAL LOW BACK PAIN WITHOUT SCIATICA: Chronic | ICD-10-CM

## 2023-03-28 RX ORDER — HYDROCODONE BITARTRATE AND ACETAMINOPHEN 5; 325 MG/1; MG/1
1 TABLET ORAL EVERY 8 HOURS PRN
Qty: 90 TABLET | Refills: 0 | Status: SHIPPED | OUTPATIENT
Start: 2023-03-29 | End: 2023-04-28

## 2023-03-29 ENCOUNTER — HOSPITAL ENCOUNTER (OUTPATIENT)
Dept: PAIN MANAGEMENT | Age: 48
Discharge: HOME | End: 2023-03-29
Payer: MEDICARE

## 2023-03-29 VITALS
HEART RATE: 73 BPM | OXYGEN SATURATION: 100 % | SYSTOLIC BLOOD PRESSURE: 126 MMHG | TEMPERATURE: 97 F | DIASTOLIC BLOOD PRESSURE: 67 MMHG | RESPIRATION RATE: 18 BRPM

## 2023-03-29 DIAGNOSIS — M79.18 MYOFASCIAL PAIN: Primary | ICD-10-CM

## 2023-03-29 PROCEDURE — 6360000002 HC RX W HCPCS

## 2023-03-29 PROCEDURE — 20611 DRAIN/INJ JOINT/BURSA W/US: CPT

## 2023-03-29 PROCEDURE — 2500000003 HC RX 250 WO HCPCS

## 2023-03-29 PROCEDURE — 20553 NJX 1/MLT TRIGGER POINTS 3/>: CPT | Performed by: NURSE PRACTITIONER

## 2023-03-29 PROCEDURE — 20553 NJX 1/MLT TRIGGER POINTS 3/>: CPT

## 2023-03-29 PROCEDURE — 76942 ECHO GUIDE FOR BIOPSY: CPT | Performed by: NURSE PRACTITIONER

## 2023-03-29 RX ORDER — TRIAMCINOLONE ACETONIDE 40 MG/ML
80 INJECTION, SUSPENSION INTRA-ARTICULAR; INTRAMUSCULAR ONCE
Status: DISCONTINUED | OUTPATIENT
Start: 2023-03-29 | End: 2023-03-31 | Stop reason: HOSPADM

## 2023-03-29 RX ORDER — BUPIVACAINE HYDROCHLORIDE 5 MG/ML
4 INJECTION, SOLUTION EPIDURAL; INTRACAUDAL ONCE
Status: DISCONTINUED | OUTPATIENT
Start: 2023-03-29 | End: 2023-03-31 | Stop reason: HOSPADM

## 2023-03-29 RX ORDER — LIDOCAINE HYDROCHLORIDE 10 MG/ML
4 INJECTION, SOLUTION EPIDURAL; INFILTRATION; INTRACAUDAL; PERINEURAL ONCE
Status: DISCONTINUED | OUTPATIENT
Start: 2023-03-29 | End: 2023-03-31 | Stop reason: HOSPADM

## 2023-03-29 NOTE — PROCEDURES
[facility] Physical & Pain Medicine    Patient Name: Karen Moore    : 1975    Age: 51 y.o. Sex: female    Date: 2023    Pre-op Diagnosis: bilateral  Sacroiliac Joint(s) Dysfunction/ Sacroiliitis    Post-op Diagnosis: bilateral  Sacroiliac Joint(s) Dysfunction/ Sacroliliits    Procedure: Ultrasound Guided Injection of  bilateral Sacroiliac Joint(s)     Performing Procedure:  SANDRO Schneider [facility]    Patient Vitals for the past 24 hrs:   BP Temp Temp src Pulse Resp SpO2   23 1316 126/67 97 °F (36.1 °C) Temporal 73 18 100 %       bilateral  Sacroiliac Joint(s)     Description of Procedure:    After voluntary, informed and signed consent obtained the patient was placed in a prone position. Appropriate time out was obtained per policy. The Sacroiliac Joint(s) was palpated for area of maximal tenderness. The area was prepped in an aseptic fashion with CHG swab. The ultrasound transducer was used to confirm the appropriate location. The skin was sprayed with Gebauer's Solution. Under aseptic technique and direct ultrasound visualization a 22 gauge 3 inch spinal needle was introduced into the Sacroiliac Joint(s). After a negative aspiration, a solution of 2 ml of 0.5% Marcaine Plain and 2 ml of 1% Lidocaine Plain and 1 ml of Kenalog (40 mg/ml) was injected into the Sacroiliac Joint(s). The needle was withdrawn and a sterile dressing applied. If this was a bilateral procedure, the same steps were followed on the opposite side.      Pre-op Diagnosis: Myofascial Pain/ Muscle Spasms    Post-op Diagnosis: Myofascial Pain/ Muscle Spasms    Procedure: Lumbar Trigger Point Injections    Performing Procedure: SANDRO Schneider - BC; VA-BC    Patient Vitals for the past 24 hrs:   BP Temp Temp src Pulse Resp SpO2   23 1316 126/67 97 °F (36.1 °C) Temporal 73 18 100 %       Description of Procedure:    After a brief physical assessment and failure to improve with conservative

## 2023-03-29 NOTE — DISCHARGE INSTRUCTIONS
Disp Refills Start End    diazePAM (VALIUM) 5 MG tablet 30 tablet 1 4/16/2022     Sig: Do not start before April 16, 2022. TAKE 1 TABLET BY MOUTH DAILY AS NEEDED FOR ANXIETY    Sent to pharmacy as: diazePAM 5 MG Oral Tablet (VALIUM)    Class: Eprescribe    E-Prescribing Status: Receipt confirmed by pharmacy (4/11/2022 10:21 AM CDT)      zolpidem (AMBIEN) 10 MG tablet 30 tablet 1 4/14/2022     Sig: Do not start before April 14, 2022. TAKE 1/2 TO 1 TABLET BY MOUTH AT BEDTIME AS NEEDED FOR SLEEP. MUST LAST 30 DAYS    Sent to pharmacy as: Zolpidem Tartrate 10 MG Oral Tablet (AMBIEN)    Class: Eprescribe    Notes to Pharmacy: Must last 30 days.    E-Prescribing Status: Receipt confirmed by pharmacy (4/11/2022 10:21 AM CDT)          Last office visit: 4/11/2022  Advised follow up: 3 months  Appointment: 7/11/2022  Last refill: 5/16/2022  Per PDMP  Is the patient due for refill of this medication(s): Yes due 6/15/2022  PDMP review: Criteria met. Forwarded to Physician/DEDRICK for signature.   Tani Ortizth & Dave Salazar       [facility] Physical And Pain Medicine  Post Procedure Discharge Instructions        YOU HAVE HAD THE FOLLOWING PROCEDURE:                                  [] Occipital Nerve Blocks  [] CTS wrist injection(s)  [] Knee Injection(s)         [] Shoulder Injection(s)   [] Elbow Injection(s)     [] Botox Injection  [] Cervical Trigger Point Injections    [] Thoracic Trigger Point Injections    [x] Lumbar Trigger Point Injections  [] Piriformis Trigger Point Injections  [x] SI Joint Injection(s)     [] Trochanteric Bursa Injection(s)       [] Ankle Injection(s)   [] Plantar Fasciitis   []  ______________  Injection(s) [] Botox []  Migraines [] Spasticity    YOU HAVE RECEIVED THE FOLLOWING MEDICATIONS IN YOUR INJECTION(s)  [x] Lidocaine [x] Bupivacaine   [] DepoMedrol (steroid) [] Decadron (steroid)  [x]  Kenalog (steroid)   [] Toradol  [] Supartz [] Kimi Falter    [] Botox        PATIENT INFORMATION:   You may experience the following symptoms after your procedure. These symptoms are normal and should not cause concern: You may have an increase in your pain. This may last 24 - 48 hours after your procedure. You may have no change in the pain that you had prior to your injection(s). You may have weakness or numbness in your affected extremity. If this occurs, this may last until numbing the medication wears off. REPORT THE FOLLOWING SYMPTOMS TO YOUR DOCTOR:  Redness, swelling or drainage at the injection site(s)  Unusual pain that interferes with your normal activities of daily living. OTHER INSTRUCTIONS:    [x] I will apply ice to the injection site(s) for at least 24 hours after the procedure. I will rotate the ice on for 20 minutes and off for 20 minutes for at least 24 hours. [x] I will not apply heat for at least 48 hours and I will not take a hot bath or shower for at least 24 hours.      [x] I understand that if Lidocaine or Bupivacaine was used in my injection(s) that the injection site(s)

## 2023-04-05 ENCOUNTER — TELEPHONE (OUTPATIENT)
Dept: PAIN MANAGEMENT | Age: 48
End: 2023-04-05

## 2023-04-05 DIAGNOSIS — M79.18 MYOFASCIAL PAIN: Primary | Chronic | ICD-10-CM

## 2023-05-09 DIAGNOSIS — G89.29 CHRONIC BILATERAL LOW BACK PAIN WITHOUT SCIATICA: Chronic | ICD-10-CM

## 2023-05-09 DIAGNOSIS — M54.50 CHRONIC BILATERAL LOW BACK PAIN WITHOUT SCIATICA: Chronic | ICD-10-CM

## 2023-05-10 RX ORDER — ONDANSETRON 8 MG/1
8 TABLET, ORALLY DISINTEGRATING ORAL EVERY 8 HOURS PRN
Qty: 90 TABLET | Refills: 0 | Status: SHIPPED | OUTPATIENT
Start: 2023-05-10 | End: 2023-08-08

## 2023-05-10 RX ORDER — HYDROCODONE BITARTRATE AND ACETAMINOPHEN 5; 325 MG/1; MG/1
1 TABLET ORAL EVERY 8 HOURS PRN
Qty: 90 TABLET | Refills: 0 | Status: SHIPPED | OUTPATIENT
Start: 2023-05-10 | End: 2023-06-09

## 2023-05-15 ENCOUNTER — HOSPITAL ENCOUNTER (OUTPATIENT)
Dept: PAIN MANAGEMENT | Age: 48
Discharge: HOME | End: 2023-05-15
Payer: MEDICARE

## 2023-05-15 VITALS
BODY MASS INDEX: 24.92 KG/M2 | RESPIRATION RATE: 16 BRPM | TEMPERATURE: 98.4 F | WEIGHT: 146 LBS | SYSTOLIC BLOOD PRESSURE: 128 MMHG | OXYGEN SATURATION: 95 % | HEART RATE: 82 BPM | DIASTOLIC BLOOD PRESSURE: 81 MMHG | HEIGHT: 64 IN

## 2023-05-15 DIAGNOSIS — G89.29 CHRONIC BILATERAL LOW BACK PAIN WITHOUT SCIATICA: Chronic | ICD-10-CM

## 2023-05-15 DIAGNOSIS — F11.90 CHRONIC, CONTINUOUS USE OF OPIOIDS: Primary | ICD-10-CM

## 2023-05-15 DIAGNOSIS — G43.019 INTRACTABLE MIGRAINE WITHOUT AURA AND WITHOUT STATUS MIGRAINOSUS: Primary | ICD-10-CM

## 2023-05-15 DIAGNOSIS — Z02.89 PAIN MEDICATION AGREEMENT SIGNED: ICD-10-CM

## 2023-05-15 DIAGNOSIS — M54.50 CHRONIC BILATERAL LOW BACK PAIN WITHOUT SCIATICA: Chronic | ICD-10-CM

## 2023-05-15 PROCEDURE — 99214 OFFICE O/P EST MOD 30 MIN: CPT | Performed by: NURSE PRACTITIONER

## 2023-05-15 PROCEDURE — 99215 OFFICE O/P EST HI 40 MIN: CPT

## 2023-05-15 RX ORDER — TRAZODONE HYDROCHLORIDE 50 MG/1
1 TABLET ORAL NIGHTLY
COMMUNITY
Start: 2023-05-09

## 2023-05-15 RX ORDER — ALPRAZOLAM 0.5 MG/1
1 TABLET ORAL 2 TIMES DAILY PRN
COMMUNITY
Start: 2023-05-12

## 2023-05-15 RX ORDER — RIZATRIPTAN BENZOATE 10 MG/1
10 TABLET, ORALLY DISINTEGRATING ORAL
Qty: 18 TABLET | Refills: 3 | Status: SHIPPED | OUTPATIENT
Start: 2023-05-15 | End: 2023-05-15

## 2023-05-15 RX ORDER — HYDROCODONE BITARTRATE AND ACETAMINOPHEN 5; 325 MG/1; MG/1
1 TABLET ORAL EVERY 8 HOURS PRN
Qty: 21 TABLET | Refills: 0 | Status: SHIPPED | OUTPATIENT
Start: 2023-05-15 | End: 2023-05-22

## 2023-05-15 RX ORDER — HYDROCODONE BITARTRATE AND ACETAMINOPHEN 5; 325 MG/1; MG/1
1 TABLET ORAL EVERY 8 HOURS PRN
Qty: 90 TABLET | Refills: 0 | Status: SHIPPED | OUTPATIENT
Start: 2023-05-22 | End: 2023-06-21

## 2023-05-15 RX ORDER — NALOXONE HYDROCHLORIDE 4 MG/.1ML
1 SPRAY NASAL PRN
Qty: 2 EACH | Refills: 5 | Status: SHIPPED | OUTPATIENT
Start: 2023-05-15

## 2023-05-15 RX ORDER — LISDEXAMFETAMINE DIMESYLATE 60 MG/1
1 CAPSULE ORAL DAILY
COMMUNITY
Start: 2023-05-12

## 2023-05-15 RX ORDER — DULOXETIN HYDROCHLORIDE 60 MG/1
1 CAPSULE, DELAYED RELEASE ORAL DAILY
COMMUNITY
Start: 2023-05-12

## 2023-05-15 ASSESSMENT — ENCOUNTER SYMPTOMS
PHOTOPHOBIA: 1
SCALP TENDERNESS: 1
VISUAL CHANGE: 1
NAUSEA: 1
BACK PAIN: 1
CONSTIPATION: 0
BLURRED VISION: 1

## 2023-05-15 ASSESSMENT — PAIN SCALES - GENERAL: PAINLEVEL_OUTOF10: 4

## 2023-05-15 ASSESSMENT — PAIN DESCRIPTION - LOCATION
LOCATION_2: NECK
LOCATION: BACK

## 2023-05-15 ASSESSMENT — PAIN DESCRIPTION - PAIN TYPE
TYPE_2: CHRONIC PAIN
TYPE: CHRONIC PAIN

## 2023-05-15 ASSESSMENT — PAIN DESCRIPTION - ORIENTATION
ORIENTATION_2: LOWER
ORIENTATION: LOWER

## 2023-05-15 ASSESSMENT — PAIN DESCRIPTION - INTENSITY: RATING_2: 2

## 2023-05-15 NOTE — PROGRESS NOTES
Clinic Documentation      Education Provided:  [x] Review of Jonna Rubinstein  [x] Agreement Review  [x] PEG Score Calculated [x] PHQ Score Calculated [x] ORT Score Calculated    [] Compliance Issues Discussed [] Cognitive Behavior Needs [x] Exercise [] Review of Test [] Financial Issues  [x] Tobacco/Alcohol Use Reviewed [x] Teaching [] New Patient [] Picture Obtained    Physician Plan:  [] Outgoing Referral  [] Pharmacy Consult  [] Test Ordered [x] Prescription Ordered/Changed   [] Obtained Test Results / Consult Notes        Complete if patient is withholding blood thinner for procedure     Blood Thinner Patient is currently taking:      [] Plavix (Hold for 7 days)  [] Aspirin (Hold for 5 days)     [] Pletal (Hold for 2 days)  [] Pradaxa (Hold for 3 days)    [] Effient (Hold for 7 days)  [] Xarelto (Hold for 2 days)    [] Eliquis (Hold for 2 days)  [] Brilinta (Hold for 7 days)    [] Coumadin (Hold for 5 days) - (INR needs to be drawn the day prior to procedure- INR < 2.0)    [] Aggrenox (Hold for 7 days)        [] Patient will stop medication on their own.    [] Blood Thinner Form Faxed for approval to hold.    Provider form faxed to:     Assessment Completed by:  Electronically signed by Kimo Posadas RN on 5/15/2023 at 1:47 PM
Pain agreement reviewed with patient and signed per patient. No questions voiced at the present and patient states understanding of agreement. Copy given to patient.
potential drug abuse or diversion were identified  [] Signs of potential drug abuse or diversion were identified   [] ORT Score   [] UDS non-compliant   [] See Note  [x] Random urine drug screen sent today   [] Random urine drug screen not completed today   [] Deferred New Patient  [] Compliant   [] Not Compliant see note  [] Medication agreement with provider signed today 4/2022  [x] Medication agreement with provider on file under media   [] Medication regimen effective with c/o of side effects noted and continued   [] New patient continuing current medication while developing POC   [x] On going assessment and evaluation of medication regimen  [] Medication regimen not effective see plan for changes  [x] Alba Micaela reviewed & on file under media     CC: MD Ophelia Hampton, APRN - CNP, 5/15/2023 at 1:13 PM    EMR dragon/transcription disclaimer: Much of this encounter note is electronic transcription/translation of spoken language to printed tach. Electronic translation of spoken language may be erroneous, or at times, nonsensical words or phrases may be inadvertently transcribed.  Although, I have reviewed the note for such errors, some may still exist.

## 2023-05-15 NOTE — TELEPHONE ENCOUNTER
1. Chronic bilateral low back pain without sciatica  - HYDROcodone-acetaminophen (NORCO) 5-325 MG per tablet; Take 1 tablet by mouth every 8 hours as needed for Pain for up to 30 days. Max Daily Amount: 3 tablets  Dispense: 90 tablet; Refill: 0  - HYDROcodone-acetaminophen (NORCO) 5-325 MG per tablet; Take 1 tablet by mouth every 8 hours as needed for Pain for up to 7 days. Max Daily Amount: 3 tablets  Dispense: 21 tablet; Refill: 0    2. Chronic, continuous use of opioids  - naloxone 4 MG/0.1ML LIQD nasal spray; 1 spray by Nasal route as needed for Opioid Reversal  Dispense: 2 each;  Refill: 5     For insurance needs 7 day script to prove not opiate naive    Continue medication with refill sent at appointment yes; refill sent to medical director at appointment NA, see refill encounter dated 5/15/2023     Electronically signed by AMNA Sen CNP on 5/15/2023 at 4:53 PM

## 2023-05-15 NOTE — DISCHARGE INSTRUCTIONS
Take Ubrelvy within 4 hours of the start of a migraine. May repeat within 4 hours if not effective. Do not exceed 9 days per month  Take Fioricet for tension headaches may repeat x 1 do not exceed 14 days per month (may cause rebound headaches)  Take Maxalt within 2 hours of the start of a migraine may make drowsy. May repeat within 2 hours if not effective. Do not exceed 9 days per month (may cause rebound headaches). May take medications on the same day if one dose is not effective but do not take 2 doses of a medication then another medication in the same day.

## 2023-05-30 ENCOUNTER — TELEPHONE (OUTPATIENT)
Dept: PAIN MANAGEMENT | Age: 48
End: 2023-05-30

## 2023-05-30 NOTE — TELEPHONE ENCOUNTER
Patient called prescription line. Call forwarded to nurse line. Patient is needing to update Yun Pill regarding her medications, and insurance approval/denial reasons. I have spoken with her and have sent Yun Pill a message. Patient will call back with any other update or need.

## 2023-05-31 ENCOUNTER — TELEPHONE (OUTPATIENT)
Dept: PAIN MANAGEMENT | Age: 48
End: 2023-05-31

## 2023-05-31 NOTE — TELEPHONE ENCOUNTER
Spoke with patient regarding her medications. Tatiana Chantell will send a [employer]. Patient stated that she looked and thinks that this is now on the preferred list. She said it worked in the past but was not covered. I spoke to her about the xanax, and reminded her to not stop abruptly, but to wean off of it. We discussed prescription drug cards such as good Rx for the norco to help get a better cash price until it can be resolved with her insurance. Patient will keep us informed if she continues to have issues with her medications.

## 2023-05-31 NOTE — TELEPHONE ENCOUNTER
Per Carlos Crow, ok to order Fer Johnson, as patient has taken in the past and was effective, and is now covered by her insurance.

## 2023-06-07 ENCOUNTER — HOSPITAL ENCOUNTER (OUTPATIENT)
Dept: PAIN MANAGEMENT | Age: 48
Discharge: HOME | End: 2023-06-07
Payer: MEDICARE

## 2023-06-07 VITALS
TEMPERATURE: 98.2 F | RESPIRATION RATE: 16 BRPM | DIASTOLIC BLOOD PRESSURE: 76 MMHG | SYSTOLIC BLOOD PRESSURE: 131 MMHG | HEART RATE: 83 BPM | OXYGEN SATURATION: 100 %

## 2023-06-07 DIAGNOSIS — M79.18 MYOFASCIAL PAIN: Primary | Chronic | ICD-10-CM

## 2023-06-07 PROCEDURE — 64616 CHEMODENERV MUSC NECK DYSTON: CPT

## 2023-06-07 PROCEDURE — 95874 GUIDE NERV DESTR NEEDLE EMG: CPT

## 2023-06-07 PROCEDURE — 6360000002 HC RX W HCPCS

## 2023-06-07 PROCEDURE — 64616 CHEMODENERV MUSC NECK DYSTON: CPT | Performed by: NURSE PRACTITIONER

## 2023-06-07 PROCEDURE — 2580000003 HC RX 258

## 2023-06-07 PROCEDURE — A4216 STERILE WATER/SALINE, 10 ML: HCPCS

## 2023-06-07 PROCEDURE — 95874 GUIDE NERV DESTR NEEDLE EMG: CPT | Performed by: NURSE PRACTITIONER

## 2023-06-07 RX ORDER — SODIUM CHLORIDE 9 MG/ML
9 INJECTION INTRAVENOUS ONCE
Status: DISCONTINUED | OUTPATIENT
Start: 2023-06-07 | End: 2023-06-09 | Stop reason: HOSPADM

## 2023-06-07 NOTE — PROGRESS NOTES
[facility]   [address] p  [phone]    Procedure:  Level of Consciousness: [x]Alert [x]Oriented []Disoriented []Lethargic  Anxiety Level: [x]Calm []Anxious []Depressed []Other  Skin: []Warm [x]Dry []Cool []Moist []Intact []Other  Cardiovascular: [x]Palpitations: [x]Never []Occasionally []Frequently  Chest Pain: [x]No []Yes  Respiratory:  [x]Unlabored []Labored []Cough ([] Productive []Unproductive)  HCG Required: [x]No []Yes   Results: []Negative []Positive  Knowledge Level:        [x]Patient/Other verbalized understanding of pre-procedure instructions. [x]Assessment of post-op care needs (transportation, responsible caregiver)        [x]Able to discuss health care problems and how to deal with it. Factors that Affect Teaching:        Language Barrier: [x]No []Yes - why:        Hearing Loss:        [x]No []Yes            Corrective Device:  []Yes []No        Vision Loss:           []No []Yes            Corrective Device:  [x]Yes []No        Memory Loss:       [x]No []Yes            []Short Term []Long Term  Motivational Level:  [x]Asks Questions                  []Extremely Anxious       [x]Seems Interested               []Seems Uninterested                  [x]Denies need for Education  Risk for Injury:  [x]Patient oriented to person, place and time  []History of frequent falls/loss of balance  Nutritional:  []Change in appetite   []Weight Gain   []Weight Loss  Functional:  []Requires assistance with ADL's      Migraine  Follow up Assessment:  Patient experiences 4 headaches per month  Patient states that he/she has 2 headaches out of 30 days each month. Patient states that he/she has 2 migraines out of 30 days each month.   Patient has experienced a  reduction in Migraine headaches less than 15 days per month [x]Yes []No  Patient has experienced a reduction in Migraine hours [x]Yes []No

## 2023-06-07 NOTE — DISCHARGE INSTRUCTIONS
will be numb for a few hours after the procedure    [] I understand if a steroid was used it will take effect in 3 - 7 days. I understand that as the numbing medication wears off, the pain may return until the steroids start working. [x] I understand that today I will rest for the next 24 hours and drink plenty of water. [x] For Botox for Migraines please do not wear anything constricting around the forehead for 7-10 days post injection. Examples headband, hats, or bandana    [] For Botox for Spasticity start therapy for the affected limb in two weeks. [] Additional instructions: ______________________________________________ ___________________________________________________________________    Sedation:  Was oral sedation given? [] Yes  [x] No    I understand that if I took an oral nerve calming medication I will not drive for  [] 24 hours after taking the medication.     [x] I have received a copy of my discharge instructions and understand the above instructions to the best of my knowledge    Patient Discharged:  [x] Home  [] Hospital  [] Other  ____________________________________________    Via:  [x] Ambulatory  [] Wheelchair   [] Stretcher [] EMS       Accompanied By:   [] Significant other  [] Family Member  [] Friend   [] Hospital Staff  []  Ambulance Staff  [x] Other_______________________________________________    Plan:  [] Will return to the office in   [] 1 month   [] 3 months for:  [] Procedure Follow-up  [x] Office Visit   [x] Planned Procedure      Patient Signature: _____________________________________________________    Staff Signature: _______________________________________________________        If you have questions, problems or concerns you may call [phone] and follow the prompts    SANDRO Merida, [facility]-BC

## 2023-06-08 NOTE — PROCEDURES
[address]    Patient Name: Rancho White    : 1975                    Age: 51 y.o. Sex: female    Date: 2023    Pre-op Diagnosis: Chronic Migraines/Cervical dystonia    Post-op Diagnosis: Chronic Migraines/Cervical dystonia    Procedure: Botox injections x 125 units for migraines and 245 for CD (30 units wasted)    Performing Procedure: AMNA Menjivar    Patient Vitals for the past 24 hrs:   BP Temp Temp src Pulse Resp SpO2   23 1305 131/76 98.2 °F (36.8 °C) Temporal 83 16 100 %       Previous Injections:  Patient had 2/2 headaches/migraines over the past month. Indications:    Rancho White has been treated for problems with chronic migraine headaches. The patient was taken through a conservative course of treatment without complete resolution of symptoms. Botox injection therapy was offered and after the risks and benefits of the procedure were explained to the patient, we had agreed to proceed. The patient was taken to the procedure room and placed in the seated position. The following muscles were identified using palpation and standard landmarks. These muscles were marked and prepped with alcohol. A total of 125 units were injected after careful aspiration and the following distribution bilaterally:  10 units in 2 sites, procerus 5 units in one site, frontalis 20 units in 4 sites, temporalis 40 unit in 8 sites, occipitals 30 units in 6 sites, and cervical paraspinals 20 units in 4 sites    The following muscles were identified using landmark location and palpation of muscle tightness. bilaterally Cervical muscles - Splenius Capitis, Splenius Cervicis, Sternocleidomastoid, Scalene Complex, Semispinalis Capitis, Rectus Capitis Posterior, Rhomboideus Major/Minor, Trapezius, Longissimus, Levator Scapulae. The skin overlying these areas were marked with a skin marker. The areas were prepped using alcohol.  A total of 245

## 2023-06-13 ENCOUNTER — TELEPHONE (OUTPATIENT)
Dept: PAIN MANAGEMENT | Age: 48
End: 2023-06-13

## 2023-06-13 DIAGNOSIS — M79.18 MYOFASCIAL PAIN: Primary | Chronic | ICD-10-CM

## 2023-06-21 ENCOUNTER — HOSPITAL ENCOUNTER (OUTPATIENT)
Dept: PAIN MANAGEMENT | Age: 48
Discharge: HOME | End: 2023-06-21
Payer: MEDICARE

## 2023-06-21 VITALS
OXYGEN SATURATION: 100 % | DIASTOLIC BLOOD PRESSURE: 73 MMHG | RESPIRATION RATE: 16 BRPM | TEMPERATURE: 98 F | HEART RATE: 85 BPM | SYSTOLIC BLOOD PRESSURE: 143 MMHG

## 2023-06-21 DIAGNOSIS — M79.18 MYOFASCIAL PAIN: Primary | Chronic | ICD-10-CM

## 2023-06-21 PROCEDURE — 6360000002 HC RX W HCPCS

## 2023-06-21 PROCEDURE — 2500000003 HC RX 250 WO HCPCS

## 2023-06-21 PROCEDURE — 20611 DRAIN/INJ JOINT/BURSA W/US: CPT

## 2023-06-21 RX ORDER — ETHYL CHLORIDE 100 %
AEROSOL, SPRAY (ML) TOPICAL PRN
Status: DISCONTINUED | OUTPATIENT
Start: 2023-06-21 | End: 2023-06-23 | Stop reason: HOSPADM

## 2023-06-21 RX ORDER — LIDOCAINE HYDROCHLORIDE 10 MG/ML
4 INJECTION, SOLUTION EPIDURAL; INFILTRATION; INTRACAUDAL; PERINEURAL ONCE
Status: DISCONTINUED | OUTPATIENT
Start: 2023-06-21 | End: 2023-06-23 | Stop reason: HOSPADM

## 2023-06-21 NOTE — DISCHARGE INSTRUCTIONS
[address] [place] [address] Physical And Pain Medicine  Post Procedure Discharge Instructions        YOU HAVE HAD THE FOLLOWING PROCEDURE:                                  [] Occipital Nerve Blocks  [] CTS wrist injection(s)  [x] Knee Injection(s)         [] Shoulder Injection(s)   [] Elbow Injection(s)     [] Botox Injection  [] Cervical Trigger Point Injections    [] Thoracic Trigger Point Injections    [] Lumbar Trigger Point Injections  [] Piriformis Trigger Point Injections  [] SI Joint Injection(s)     [] Trochanteric Bursa Injection(s)       [] Ankle Injection(s)   [] Plantar Fasciitis   []  ______________  Injection(s) [] Botox []  Migraines [] Spasticity    YOU HAVE RECEIVED THE FOLLOWING MEDICATIONS IN YOUR INJECTION(s)  [x] Lidocaine [] Bupivacaine   [] DepoMedrol (steroid) [] Decadron (steroid)  []  Kenalog (steroid)   [] Toradol  [] Supartz [x] Jose Kitchen    [] Botox        PATIENT INFORMATION:   You may experience the following symptoms after your procedure. These symptoms are normal and should not cause concern: You may have an increase in your pain. This may last 24 - 48 hours after your procedure. You may have no change in the pain that you had prior to your injection(s). You may have weakness or numbness in your affected extremity. If this occurs, this may last until numbing the medication wears off. REPORT THE FOLLOWING SYMPTOMS TO YOUR DOCTOR:  Redness, swelling or drainage at the injection site(s)  Unusual pain that interferes with your normal activities of daily living. OTHER INSTRUCTIONS:    [x] I will apply ice to the injection site(s) for at least 24 hours after the procedure. I will rotate the ice on for 20 minutes and off for 20 minutes for at least 24 hours. [x] I will not apply heat for at least 48 hours and I will not take a hot bath or shower for at least 24 hours.      [x] I understand that if Lidocaine or Bupivacaine was used in my injection(s) that the injection site(s)

## 2023-06-21 NOTE — PROCEDURES
[address] [place] [address] Physical & Pain Medicine    Patient Name: Stephane Ramirez    : 1975    Age: 51 y.o. Sex: female    Date: 2023    Preop Diagnosis: Osteoarthritis of bilateral Knee(s)    Postop Diagnosis: Osteoarthritis of  bilateral  Knee(s)      Procedure: Injection of  bilateral  Knee(s) with US confirmation    Performing Procedure: Regina Melendez, AGACNP-BC    Patient Vitals for the past 24 hrs:   BP Temp Temp src Pulse Resp SpO2   23 1338 (!) 143/73 98 °F (36.7 °C) Temporal 85 16 100 %       Description of Procedure:    After a brief physical assessment and failure to improve with conservative measures the patient presented for an injection of the bilateral Knee(s). The indications, limitations and possible complications were discussed with the patient and the patient elected to proceed with the procedure. bilateral Knee(s)    After voluntary, informed and signed consent obtained the patient was placed in a seated position. Appropriate time out was obtained per policy. The knee was palpated at the anterior aspect of the knee to locate the patellar tendon. The projected injection site is approximately 1 cm medially or laterally of the midpoint of the tendon. Usually, there is a depression. The area was marked. The ultrasound transducer was used to confirm the appropriate location. The skin prepped in an aseptic fashion with CHG swab and then sprayed with Gebauer's Solution. Under aseptic technique 22 gauge 1 1/2 inch needle was then introduced into the Knee(s). After a negative aspiration, 1 ml of 1% Lidocaine Plain was injected into the knee prior to the 5 ml reconstituted Zilretta, which consists of  4 ml prepackaged solution, mixed with Zilretta (32mg/ml) constitute and this was injected into the Knee(s). The needle was withdrawn and a sterile dressing applied. If this was a bilateral procedure the same steps were followed on the opposite side. Discharge:   The patient

## 2023-07-05 ENCOUNTER — HOSPITAL ENCOUNTER (OUTPATIENT)
Dept: PAIN MANAGEMENT | Age: 48
Discharge: HOME | End: 2023-07-05
Payer: MEDICARE

## 2023-07-05 VITALS
DIASTOLIC BLOOD PRESSURE: 90 MMHG | RESPIRATION RATE: 18 BRPM | OXYGEN SATURATION: 100 % | HEART RATE: 82 BPM | TEMPERATURE: 97.8 F | SYSTOLIC BLOOD PRESSURE: 107 MMHG

## 2023-07-05 PROCEDURE — 2500000003 HC RX 250 WO HCPCS

## 2023-07-05 PROCEDURE — 20553 NJX 1/MLT TRIGGER POINTS 3/>: CPT

## 2023-07-05 PROCEDURE — 76942 ECHO GUIDE FOR BIOPSY: CPT | Performed by: NURSE PRACTITIONER

## 2023-07-05 PROCEDURE — 20553 NJX 1/MLT TRIGGER POINTS 3/>: CPT | Performed by: NURSE PRACTITIONER

## 2023-07-05 PROCEDURE — 6360000002 HC RX W HCPCS

## 2023-07-05 PROCEDURE — 20611 DRAIN/INJ JOINT/BURSA W/US: CPT

## 2023-07-05 RX ORDER — ETHYL CHLORIDE 100 %
AEROSOL, SPRAY (ML) TOPICAL PRN
Status: DISCONTINUED | OUTPATIENT
Start: 2023-07-05 | End: 2023-07-07 | Stop reason: HOSPADM

## 2023-07-05 RX ORDER — LIDOCAINE HYDROCHLORIDE 10 MG/ML
2 INJECTION, SOLUTION EPIDURAL; INFILTRATION; INTRACAUDAL; PERINEURAL ONCE
Status: DISCONTINUED | OUTPATIENT
Start: 2023-07-05 | End: 2023-07-07 | Stop reason: HOSPADM

## 2023-07-05 RX ORDER — TRIAMCINOLONE ACETONIDE 40 MG/ML
80 INJECTION, SUSPENSION INTRA-ARTICULAR; INTRAMUSCULAR ONCE
Status: DISCONTINUED | OUTPATIENT
Start: 2023-07-05 | End: 2023-07-07 | Stop reason: HOSPADM

## 2023-07-05 RX ORDER — BUPIVACAINE HYDROCHLORIDE 5 MG/ML
10 INJECTION, SOLUTION EPIDURAL; INTRACAUDAL ONCE
Status: DISCONTINUED | OUTPATIENT
Start: 2023-07-05 | End: 2023-07-07 | Stop reason: HOSPADM

## 2023-07-05 RX ORDER — LIDOCAINE HYDROCHLORIDE 10 MG/ML
10 INJECTION, SOLUTION EPIDURAL; INFILTRATION; INTRACAUDAL; PERINEURAL ONCE
Status: DISCONTINUED | OUTPATIENT
Start: 2023-07-05 | End: 2023-07-07 | Stop reason: HOSPADM

## 2023-07-05 RX ORDER — BUPIVACAINE HYDROCHLORIDE 5 MG/ML
2 INJECTION, SOLUTION EPIDURAL; INTRACAUDAL ONCE
Status: DISCONTINUED | OUTPATIENT
Start: 2023-07-05 | End: 2023-07-07 | Stop reason: HOSPADM

## 2023-07-05 NOTE — DISCHARGE INSTRUCTIONS
[address] [facility] Physical And Pain Medicine  Post Procedure Discharge Instructions        YOU HAVE HAD THE FOLLOWING PROCEDURE:                                  [] Occipital Nerve Blocks  [] CTS wrist injection(s)  [] Knee Injection(s)         [] Shoulder Injection(s)   [] Elbow Injection(s)     [] Botox Injection  [] Cervical Trigger Point Injections    [] Thoracic Trigger Point Injections    [x] Lumbar Trigger Point Injections  [] Piriformis Trigger Point Injections  [x] SI Joint Injection(s)     [] Trochanteric Bursa Injection(s)       [] Ankle Injection(s)   [] Plantar Fasciitis   []  ______________  Injection(s) [] Botox []  Migraines [] Spasticity    YOU HAVE RECEIVED THE FOLLOWING MEDICATIONS IN YOUR INJECTION(s)  [x] Lidocaine [x] Bupivacaine   [] DepoMedrol (steroid) [] Decadron (steroid)  [x]  Kenalog (steroid)   [] Toradol  [] Supartz [] Yoel Mention    [] Botox        PATIENT INFORMATION:   You may experience the following symptoms after your procedure. These symptoms are normal and should not cause concern: You may have an increase in your pain. This may last 24 - 48 hours after your procedure. You may have no change in the pain that you had prior to your injection(s). You may have weakness or numbness in your affected extremity. If this occurs, this may last until numbing the medication wears off. REPORT THE FOLLOWING SYMPTOMS TO YOUR DOCTOR:  Redness, swelling or drainage at the injection site(s)  Unusual pain that interferes with your normal activities of daily living. OTHER INSTRUCTIONS:    [x] I will apply ice to the injection site(s) for at least 24 hours after the procedure. I will rotate the ice on for 20 minutes and off for 20 minutes for at least 24 hours. [x] I will not apply heat for at least 48 hours and I will not take a hot bath or shower for at least 24 hours.      [x] I understand that if Lidocaine or Bupivacaine was used in my injection(s) that the injection site(s)

## 2023-07-10 DIAGNOSIS — G89.29 CHRONIC BILATERAL LOW BACK PAIN WITHOUT SCIATICA: Chronic | ICD-10-CM

## 2023-07-10 DIAGNOSIS — M54.50 CHRONIC BILATERAL LOW BACK PAIN WITHOUT SCIATICA: Chronic | ICD-10-CM

## 2023-07-12 RX ORDER — HYDROCODONE BITARTRATE AND ACETAMINOPHEN 5; 325 MG/1; MG/1
1 TABLET ORAL EVERY 8 HOURS PRN
Qty: 90 TABLET | Refills: 0 | Status: SHIPPED | OUTPATIENT
Start: 2023-07-13 | End: 2023-08-12

## 2023-07-13 ENCOUNTER — TELEPHONE (OUTPATIENT)
Dept: PAIN MANAGEMENT | Age: 48
End: 2023-07-13

## 2023-07-13 DIAGNOSIS — M79.18 MYOFASCIAL PAIN: Primary | Chronic | ICD-10-CM

## 2023-08-14 DIAGNOSIS — G43.019 INTRACTABLE MIGRAINE WITHOUT AURA AND WITHOUT STATUS MIGRAINOSUS: Chronic | ICD-10-CM

## 2023-08-14 DIAGNOSIS — M54.50 CHRONIC BILATERAL LOW BACK PAIN WITHOUT SCIATICA: Chronic | ICD-10-CM

## 2023-08-14 DIAGNOSIS — G89.29 CHRONIC BILATERAL LOW BACK PAIN WITHOUT SCIATICA: Chronic | ICD-10-CM

## 2023-08-14 DIAGNOSIS — Z86.19 HISTORY OF SHINGLES: ICD-10-CM

## 2023-08-15 RX ORDER — HYDROCODONE BITARTRATE AND ACETAMINOPHEN 5; 325 MG/1; MG/1
1 TABLET ORAL EVERY 8 HOURS PRN
Qty: 90 TABLET | Refills: 0 | Status: SHIPPED | OUTPATIENT
Start: 2023-08-15 | End: 2023-09-14

## 2023-08-15 RX ORDER — LIDOCAINE 50 MG/G
3 PATCH TOPICAL DAILY
Qty: 30 PATCH | Refills: 2 | Status: SHIPPED | OUTPATIENT
Start: 2023-08-15

## 2023-08-15 RX ORDER — BUTALBITAL, ACETAMINOPHEN AND CAFFEINE 50; 325; 40 MG/1; MG/1; MG/1
1 TABLET ORAL DAILY PRN
Qty: 30 TABLET | Refills: 0 | Status: SHIPPED | OUTPATIENT
Start: 2023-08-15

## 2023-08-31 ENCOUNTER — HOSPITAL ENCOUNTER (OUTPATIENT)
Dept: PAIN MANAGEMENT | Age: 48
Discharge: HOME | End: 2023-08-31
Payer: MEDICARE

## 2023-08-31 VITALS
OXYGEN SATURATION: 100 % | RESPIRATION RATE: 18 BRPM | TEMPERATURE: 97 F | HEART RATE: 86 BPM | SYSTOLIC BLOOD PRESSURE: 128 MMHG | DIASTOLIC BLOOD PRESSURE: 81 MMHG

## 2023-08-31 PROCEDURE — A4216 STERILE WATER/SALINE, 10 ML: HCPCS

## 2023-08-31 PROCEDURE — 95874 GUIDE NERV DESTR NEEDLE EMG: CPT

## 2023-08-31 PROCEDURE — 64616 CHEMODENERV MUSC NECK DYSTON: CPT

## 2023-08-31 PROCEDURE — 64646 CHEMODENERV TRUNK MUSC 1-5: CPT

## 2023-08-31 PROCEDURE — 6360000002 HC RX W HCPCS

## 2023-08-31 PROCEDURE — 2580000003 HC RX 258

## 2023-08-31 RX ORDER — SODIUM CHLORIDE 9 MG/ML
9 INJECTION INTRAVENOUS ONCE
Status: DISCONTINUED | OUTPATIENT
Start: 2023-08-31 | End: 2023-09-02 | Stop reason: HOSPADM

## 2023-08-31 NOTE — DISCHARGE INSTRUCTIONS
[address] [facility] Physical And Pain Medicine  Post Procedure Discharge Instructions        YOU HAVE HAD THE FOLLOWING PROCEDURE:                                  [] Occipital Nerve Blocks  [] CTS wrist injection(s)  [] Knee Injection(s)         [] Shoulder Injection(s)   [] Elbow Injection(s)     [x] Botox Injection  [] Cervical Trigger Point Injections    [] Thoracic Trigger Point Injections    [] Lumbar Trigger Point Injections  [] Piriformis Trigger Point Injections  [] SI Joint Injection(s)     [] Trochanteric Bursa Injection(s)       [] Ankle Injection(s)   [] Plantar Fasciitis   []  ______________  Injection(s) [x] Botox [x]  Migraines [x] Spasticity    YOU HAVE RECEIVED THE FOLLOWING MEDICATIONS IN YOUR INJECTION(s)  [] Lidocaine [] Bupivacaine   [] DepoMedrol (steroid) [] Decadron (steroid)  []  Kenalog (steroid)   [] Toradol  [] Supartz [] Sharalyn Parent    [x] Botox        PATIENT INFORMATION:   You may experience the following symptoms after your procedure. These symptoms are normal and should not cause concern: You may have an increase in your pain. This may last 24 - 48 hours after your procedure. You may have no change in the pain that you had prior to your injection(s). You may have weakness or numbness in your affected extremity. If this occurs, this may last until numbing the medication wears off. REPORT THE FOLLOWING SYMPTOMS TO YOUR DOCTOR:  Redness, swelling or drainage at the injection site(s)  Unusual pain that interferes with your normal activities of daily living. OTHER INSTRUCTIONS:    [x] I will apply ice to the injection site(s) for at least 24 hours after the procedure. I will rotate the ice on for 20 minutes and off for 20 minutes for at least 24 hours. [x] I will not apply heat for at least 48 hours and I will not take a hot bath or shower for at least 24 hours.      [] I understand that if Lidocaine or Bupivacaine was used in my injection(s) that the injection site(s)

## 2023-08-31 NOTE — PROGRESS NOTES
Procedure:  Level of Consciousness: [x]Alert [x]Oriented []Disoriented []Lethargic  Anxiety Level: [x]Calm []Anxious []Depressed []Other  Skin: [x]Warm [x]Dry []Cool []Moist []Intact []Other  Cardiovascular: [x]Palpitations: []Never [x]Occasionally []Frequently  Chest Pain: [x]No []Yes  Respiratory:  [x]Unlabored []Labored []Cough ([] Productive []Unproductive)  HCG Required: [x]No []Yes   Results: []Negative []Positive  Knowledge Level:        [x]Patient/Other verbalized understanding of pre-procedure instructions. [x]Assessment of post-op care needs (transportation, responsible caregiver)        [x]Able to discuss health care problems and how to deal with it.   Factors that Affect Teaching:        Language Barrier: [x]No []Yes - why:        Hearing Loss:        [x]No []Yes            Corrective Device:  []Yes [x]No        Vision Loss:           []No [x]Yes            Corrective Device:  [x]Yes []No        Memory Loss:       [x]No []Yes            []Short Term []Long Term  Motivational Level:  [x]Asks Questions                  []Extremely Anxious       [x]Seems Interested               []Seems Uninterested                  []Denies need for Education  Risk for Injury:  [x]Patient oriented to person, place and time  []History of frequent falls/loss of balance  Nutritional:  []Change in appetite   []Weight Gain   []Weight Loss  Functional:  []Requires assistance with ADL's

## 2023-08-31 NOTE — PROGRESS NOTES
Patient states that he/she has ___8___ headaches out of 30 days each month.   Patient states that he/she has ___1___ migraines out of 30 days each month.monthly

## 2023-09-12 DIAGNOSIS — M54.50 CHRONIC BILATERAL LOW BACK PAIN WITHOUT SCIATICA: Chronic | ICD-10-CM

## 2023-09-12 DIAGNOSIS — G89.29 CHRONIC BILATERAL LOW BACK PAIN WITHOUT SCIATICA: Chronic | ICD-10-CM

## 2023-09-12 NOTE — TELEPHONE ENCOUNTER
Office cancelled appt on 8/31/23(per Annamaria Yang). Patient was unable to come on Friday 9/1/23. Rescheduled for 10/23/23 with Anne Oquendo.

## 2023-09-14 RX ORDER — HYDROCODONE BITARTRATE AND ACETAMINOPHEN 5; 325 MG/1; MG/1
1 TABLET ORAL EVERY 8 HOURS PRN
Qty: 90 TABLET | Refills: 0 | Status: SHIPPED | OUTPATIENT
Start: 2023-09-15 | End: 2023-10-15

## 2023-09-25 DIAGNOSIS — M54.50 CHRONIC BILATERAL LOW BACK PAIN WITHOUT SCIATICA: Chronic | ICD-10-CM

## 2023-09-25 DIAGNOSIS — G89.29 CHRONIC BILATERAL LOW BACK PAIN WITHOUT SCIATICA: Chronic | ICD-10-CM

## 2023-09-25 DIAGNOSIS — G43.019 INTRACTABLE MIGRAINE WITHOUT AURA AND WITHOUT STATUS MIGRAINOSUS: Chronic | ICD-10-CM

## 2023-10-04 RX ORDER — BUTALBITAL, ACETAMINOPHEN AND CAFFEINE 50; 325; 40 MG/1; MG/1; MG/1
1 TABLET ORAL DAILY PRN
Qty: 30 TABLET | Refills: 0 | OUTPATIENT
Start: 2023-10-04

## 2023-10-04 RX ORDER — LIDOCAINE 50 MG/G
3 PATCH TOPICAL DAILY
Refills: 2 | OUTPATIENT
Start: 2023-10-04

## 2023-10-04 RX ORDER — ONDANSETRON 8 MG/1
TABLET, ORALLY DISINTEGRATING ORAL
Qty: 90 TABLET | Refills: 0 | OUTPATIENT
Start: 2023-10-04

## 2023-10-04 RX ORDER — BUTALBITAL, ACETAMINOPHEN AND CAFFEINE 50; 325; 40 MG/1; MG/1; MG/1
1 TABLET ORAL DAILY PRN
Qty: 30 TABLET | Refills: 2 | OUTPATIENT
Start: 2023-10-04

## 2023-10-04 RX ORDER — IBUPROFEN 800 MG/1
TABLET ORAL
Qty: 90 TABLET | Refills: 2 | OUTPATIENT
Start: 2023-10-04

## 2023-10-04 RX ORDER — HYDROCODONE BITARTRATE AND ACETAMINOPHEN 5; 325 MG/1; MG/1
TABLET ORAL
Qty: 60 TABLET | Refills: 0 | OUTPATIENT
Start: 2023-10-04

## 2023-10-04 RX ORDER — HYDROCODONE BITARTRATE AND ACETAMINOPHEN 5; 325 MG/1; MG/1
1 TABLET ORAL 2 TIMES DAILY PRN
Qty: 60 TABLET | Refills: 0 | OUTPATIENT
Start: 2023-10-04 | End: 2023-11-03

## 2023-10-04 RX ORDER — HYDROCODONE BITARTRATE AND ACETAMINOPHEN 5; 325 MG/1; MG/1
1 TABLET ORAL EVERY 8 HOURS PRN
Qty: 90 TABLET | Refills: 0 | OUTPATIENT
Start: 2023-10-04

## 2023-10-04 RX ORDER — HYDROCODONE BITARTRATE AND ACETAMINOPHEN 5; 325 MG/1; MG/1
1 TABLET ORAL EVERY 8 HOURS PRN
Qty: 90 TABLET | Refills: 0 | OUTPATIENT
Start: 2023-10-04 | End: 2023-11-03

## 2023-10-18 ENCOUNTER — OFFICE VISIT (OUTPATIENT)
Dept: FAMILY MEDICINE CLINIC | Facility: CLINIC | Age: 48
End: 2023-10-18
Payer: MEDICARE

## 2023-10-18 VITALS
HEART RATE: 76 BPM | OXYGEN SATURATION: 97 % | DIASTOLIC BLOOD PRESSURE: 74 MMHG | HEIGHT: 64 IN | BODY MASS INDEX: 26.98 KG/M2 | SYSTOLIC BLOOD PRESSURE: 120 MMHG | TEMPERATURE: 98.5 F | RESPIRATION RATE: 18 BRPM | WEIGHT: 158 LBS

## 2023-10-18 DIAGNOSIS — R79.9 ABNORMAL FINDING OF BLOOD CHEMISTRY, UNSPECIFIED: ICD-10-CM

## 2023-10-18 DIAGNOSIS — G43.809 OTHER MIGRAINE WITHOUT STATUS MIGRAINOSUS, NOT INTRACTABLE: ICD-10-CM

## 2023-10-18 DIAGNOSIS — Z12.11 SCREEN FOR COLON CANCER: ICD-10-CM

## 2023-10-18 DIAGNOSIS — Z23 NEED FOR VACCINATION: ICD-10-CM

## 2023-10-18 DIAGNOSIS — G89.29 OTHER CHRONIC PAIN: Primary | ICD-10-CM

## 2023-10-18 DIAGNOSIS — F41.9 ANXIETY: ICD-10-CM

## 2023-10-18 DIAGNOSIS — F32.A DEPRESSION, UNSPECIFIED DEPRESSION TYPE: ICD-10-CM

## 2023-10-18 PROBLEM — G43.909 MIGRAINE: Status: ACTIVE | Noted: 2023-10-18

## 2023-10-18 RX ORDER — IBUPROFEN 800 MG/1
800 TABLET ORAL EVERY 6 HOURS PRN
COMMUNITY

## 2023-10-18 RX ORDER — TRAZODONE HYDROCHLORIDE 50 MG/1
50 TABLET ORAL DAILY
COMMUNITY
Start: 2023-05-09

## 2023-10-18 RX ORDER — DULOXETIN HYDROCHLORIDE 30 MG/1
30 CAPSULE, DELAYED RELEASE ORAL DAILY
COMMUNITY
Start: 2023-09-25

## 2023-10-18 RX ORDER — ZOLPIDEM TARTRATE 10 MG/1
10 TABLET ORAL DAILY
COMMUNITY
Start: 2023-06-09

## 2023-10-18 RX ORDER — ONDANSETRON 8 MG/1
8 TABLET, ORALLY DISINTEGRATING ORAL EVERY 8 HOURS PRN
COMMUNITY

## 2023-10-18 RX ORDER — BUTALBITAL, ACETAMINOPHEN AND CAFFEINE 50; 325; 40 MG/1; MG/1; MG/1
1 TABLET ORAL 2 TIMES DAILY PRN
COMMUNITY
Start: 2023-08-15

## 2023-10-18 RX ORDER — DULOXETIN HYDROCHLORIDE 60 MG/1
1 CAPSULE, DELAYED RELEASE ORAL DAILY
COMMUNITY
Start: 2023-05-12

## 2023-10-18 RX ORDER — HYDROCODONE BITARTRATE AND ACETAMINOPHEN 5; 325 MG/1; MG/1
1 TABLET ORAL 3 TIMES DAILY PRN
COMMUNITY
Start: 2023-09-15

## 2023-10-18 RX ORDER — LIDOCAINE 50 MG/G
3 PATCH TOPICAL EVERY 24 HOURS
COMMUNITY
Start: 2023-08-15

## 2023-10-18 RX ORDER — LAMOTRIGINE 200 MG/1
200 TABLET ORAL DAILY
COMMUNITY
Start: 2023-06-09

## 2023-10-18 RX ORDER — LISDEXAMFETAMINE DIMESYLATE CAPSULES 60 MG/1
60 CAPSULE ORAL DAILY
COMMUNITY
Start: 2023-05-12

## 2023-10-18 RX ORDER — HYDROXYZINE PAMOATE 25 MG/1
25 CAPSULE ORAL 3 TIMES DAILY PRN
COMMUNITY
Start: 2023-06-09

## 2023-10-18 NOTE — PROGRESS NOTES
Subjective   Lonikarlos Peña is a 48 y.o. female.     Chief Complaint   Patient presents with    Establish Care    Medicare Wellness-subsequent      History of Present Illness     Here today to [facility] care---complex hx and sees pain management, psychiatry--no new complaints today      Current Outpatient Medications:     butalbital-acetaminophen-caffeine (FIORICET, ESGIC) -40 MG per tablet, Take 1 tablet by mouth 2 (Two) Times a Day As Needed., Disp: , Rfl:     DULoxetine (CYMBALTA) 30 MG capsule, Take 1 capsule by mouth Daily., Disp: , Rfl:     DULoxetine (CYMBALTA) 60 MG capsule, Take 1 capsule by mouth Daily., Disp: , Rfl:     HYDROcodone-acetaminophen (NORCO) 5-325 MG per tablet, Take 1 tablet by mouth 3 (Three) Times a Day As Needed., Disp: , Rfl:     hydrOXYzine pamoate (VISTARIL) 25 MG capsule, Take 1 capsule by mouth 3 (Three) Times a Day As Needed., Disp: , Rfl:     ibuprofen (ADVIL,MOTRIN) 800 MG tablet, Take 1 tablet by mouth Every 6 (Six) Hours As Needed for Mild Pain., Disp: , Rfl:     lamoTRIgine (LaMICtal) 200 MG tablet, Take 1 tablet by mouth Daily., Disp: , Rfl:     lidocaine (LIDODERM) 5 %, Place 3 patches on the skin as directed by provider Daily., Disp: , Rfl:     lisdexamfetamine (VYVANSE) 60 MG capsule, Take 1 capsule by mouth Daily, Disp: , Rfl:     ondansetron ODT (ZOFRAN-ODT) 8 MG disintegrating tablet, Take 1 tablet by mouth Every 8 (Eight) Hours As Needed., Disp: , Rfl:     traZODone (DESYREL) 50 MG tablet, Take 1 tablet by mouth Daily., Disp: , Rfl:     ubrogepant (Ubrelvy) 100 MG tablet, Take 1 tablet by mouth Daily., Disp: , Rfl:     zolpidem (AMBIEN) 10 MG tablet, Take 1 tablet by mouth Daily., Disp: , Rfl:   Allergies   Allergen Reactions    Griseofulvin Ultramicrosize [Griseofulvin] Hives       BMI is >= 25 and <30. (Overweight) The following options were offered after discussion;: nutrition counseling/recommendations      Facility age limit for growth %dennis is 20 years.    No  "past medical history on file.  No past surgical history on file.    Review of Systems   Constitutional: Negative.    Eyes: Negative.    Respiratory: Negative.     Cardiovascular: Negative.    Gastrointestinal: Negative.    Endocrine: Negative.    Genitourinary: Negative.    Musculoskeletal:  Positive for arthralgias, back pain and neck pain.   Skin: Negative.    Allergic/Immunologic: Negative.    Neurological:  Positive for headaches.   Hematological: Negative.    Psychiatric/Behavioral: Negative.         Objective /74   Pulse 76   Temp 98.5 °F (36.9 °C)   Resp 18   Ht 162.6 cm (64\")   Wt 71.7 kg (158 lb)   SpO2 97%   BMI 27.12 kg/m²    Physical Exam  Vitals and nursing note reviewed.   Constitutional:       Appearance: Normal appearance. She is normal weight.   HENT:      Head: Normocephalic and atraumatic.      Nose: Nose normal.      Mouth/Throat:      Mouth: Mucous membranes are moist.   Eyes:      Extraocular Movements: Extraocular movements intact.      Pupils: Pupils are equal, round, and reactive to light.   Cardiovascular:      Rate and Rhythm: Normal rate and regular rhythm.      Pulses: Normal pulses.   Pulmonary:      Effort: Pulmonary effort is normal.   Abdominal:      General: Abdomen is flat. Bowel sounds are normal.      Palpations: Abdomen is soft.   Musculoskeletal:         General: Normal range of motion.      Cervical back: Normal range of motion and neck supple.   Skin:     General: Skin is warm and dry.      Capillary Refill: Capillary refill takes less than 2 seconds.   Neurological:      General: No focal deficit present.      Mental Status: She is alert and oriented to person, place, and time. Mental status is at baseline.   Psychiatric:         Mood and Affect: Mood normal.         Assessment & Plan   Diagnoses and all orders for this visit:    1. Other chronic pain (Primary)  -     CBC & Differential  -     Comprehensive metabolic panel  -     Lipid Panel With / Chol / HDL " Ratio  -     TSH    2. Other migraine without status migrainosus, not intractable  -     CBC & Differential  -     Comprehensive metabolic panel  -     Lipid Panel With / Chol / HDL Ratio  -     TSH    3. Anxiety  -     CBC & Differential  -     Comprehensive metabolic panel  -     Lipid Panel With / Chol / HDL Ratio  -     TSH    4. Depression, unspecified depression type  -     CBC & Differential  -     Comprehensive metabolic panel  -     Lipid Panel With / Chol / HDL Ratio  -     TSH    5. Screen for colon cancer  -     Ambulatory Referral For Screening Colonoscopy    6. Abnormal finding of blood chemistry, unspecified  -     Lipid Panel With / Chol / HDL Ratio    Other orders  -     Fluzone >6 Months ([id number])                 Orders Placed This Encounter   Procedures    Fluzone >6 Months ([id number])    Comprehensive metabolic panel     Order Specific Question:   Release to patient     Answer:   Routine Release [[id number]]    Lipid Panel With / Chol / HDL Ratio     Order Specific Question:   Release to patient     Answer:   Routine Release [[id number]]    TSH     Order Specific Question:   Release to patient     Answer:   Routine Release [[id number]]    Ambulatory Referral For Screening Colonoscopy     Referral Priority:   Routine     Referral Type:   Diagnostic Medical     Referral Reason:   Specialty Services Required     Number of Visits Requested:   1    CBC & Differential     Order Specific Question:   Release to patient     Answer:   Routine Release [[id number]]       Follow up: 6 month(s)

## 2023-10-19 DIAGNOSIS — D64.9 ANEMIA, UNSPECIFIED TYPE: Primary | ICD-10-CM

## 2023-10-19 LAB
ALBUMIN SERPL-MCNC: 4.6 G/DL (ref 3.5–5.2)
ALBUMIN/GLOB SERPL: 2.4 G/DL
ALP SERPL-CCNC: 69 U/L (ref 39–117)
ALT SERPL-CCNC: 13 U/L (ref 1–33)
AST SERPL-CCNC: 20 U/L (ref 1–32)
BASOPHILS # BLD AUTO: 0.07 10*3/MM3 (ref 0–0.2)
BASOPHILS NFR BLD AUTO: 1 % (ref 0–1.5)
BILIRUB SERPL-MCNC: 0.6 MG/DL (ref 0–1.2)
BUN SERPL-MCNC: 11 MG/DL (ref 6–20)
BUN/CREAT SERPL: 13.9 (ref 7–25)
CALCIUM SERPL-MCNC: 9 MG/DL (ref 8.6–10.5)
CHLORIDE SERPL-SCNC: 99 MMOL/L (ref 98–107)
CHOLEST SERPL-MCNC: 296 MG/DL (ref 0–200)
CHOLEST/HDLC SERPL: 4.63 {RATIO}
CO2 SERPL-SCNC: 30.2 MMOL/L (ref 22–29)
CREAT SERPL-MCNC: 0.79 MG/DL (ref 0.57–1)
EGFRCR SERPLBLD CKD-EPI 2021: 91.8 ML/MIN/1.73
EOSINOPHIL # BLD AUTO: 0.09 10*3/MM3 (ref 0–0.4)
EOSINOPHIL NFR BLD AUTO: 1.3 % (ref 0.3–6.2)
ERYTHROCYTE [DISTWIDTH] IN BLOOD BY AUTOMATED COUNT: 12 % (ref 12.3–15.4)
FERRITIN SERPL-MCNC: 34 NG/ML (ref 13–150)
GLOBULIN SER CALC-MCNC: 1.9 GM/DL
GLUCOSE SERPL-MCNC: 92 MG/DL (ref 65–99)
HCT VFR BLD AUTO: 35.9 % (ref 34–46.6)
HDLC SERPL-MCNC: 64 MG/DL (ref 40–60)
HGB BLD-MCNC: 11.9 G/DL (ref 12–15.9)
IMM GRANULOCYTES # BLD AUTO: 0.02 10*3/MM3 (ref 0–0.05)
IMM GRANULOCYTES NFR BLD AUTO: 0.3 % (ref 0–0.5)
IRON SERPL-MCNC: 132 MCG/DL (ref 37–145)
LDLC SERPL CALC-MCNC: 218 MG/DL (ref 0–100)
LYMPHOCYTES # BLD AUTO: 1.72 10*3/MM3 (ref 0.7–3.1)
LYMPHOCYTES NFR BLD AUTO: 24.1 % (ref 19.6–45.3)
MCH RBC QN AUTO: 31.1 PG (ref 26.6–33)
MCHC RBC AUTO-ENTMCNC: 33.1 G/DL (ref 31.5–35.7)
MCV RBC AUTO: 93.7 FL (ref 79–97)
MONOCYTES # BLD AUTO: 0.6 10*3/MM3 (ref 0.1–0.9)
MONOCYTES NFR BLD AUTO: 8.4 % (ref 5–12)
NEUTROPHILS # BLD AUTO: 4.65 10*3/MM3 (ref 1.7–7)
NEUTROPHILS NFR BLD AUTO: 64.9 % (ref 42.7–76)
NRBC BLD AUTO-RTO: 0 /100 WBC (ref 0–0.2)
PLATELET # BLD AUTO: 289 10*3/MM3 (ref 140–450)
POTASSIUM SERPL-SCNC: 3.9 MMOL/L (ref 3.5–5.2)
PROT SERPL-MCNC: 6.5 G/DL (ref 6–8.5)
RBC # BLD AUTO: 3.83 10*6/MM3 (ref 3.77–5.28)
SODIUM SERPL-SCNC: 137 MMOL/L (ref 136–145)
TRIGL SERPL-MCNC: 88 MG/DL (ref 0–150)
TSH SERPL DL<=0.005 MIU/L-ACNC: 2.93 UIU/ML (ref 0.27–4.2)
VIT B12 SERPL-MCNC: 493 PG/ML (ref 211–946)
VLDLC SERPL CALC-MCNC: 14 MG/DL (ref 5–40)
WBC # BLD AUTO: 7.15 10*3/MM3 (ref 3.4–10.8)
WRITTEN AUTHORIZATION: NORMAL

## 2023-10-23 ENCOUNTER — HOSPITAL ENCOUNTER (OUTPATIENT)
Dept: PAIN MANAGEMENT | Age: 48
Discharge: HOME | End: 2023-10-23
Payer: MEDICARE

## 2023-10-23 VITALS
OXYGEN SATURATION: 99 % | DIASTOLIC BLOOD PRESSURE: 84 MMHG | TEMPERATURE: 96.4 F | RESPIRATION RATE: 16 BRPM | SYSTOLIC BLOOD PRESSURE: 134 MMHG | BODY MASS INDEX: 26.63 KG/M2 | WEIGHT: 156 LBS | HEART RATE: 88 BPM | HEIGHT: 64 IN

## 2023-10-23 DIAGNOSIS — G43.019 INTRACTABLE MIGRAINE WITHOUT AURA AND WITHOUT STATUS MIGRAINOSUS: Chronic | ICD-10-CM

## 2023-10-23 DIAGNOSIS — M54.50 CHRONIC BILATERAL LOW BACK PAIN WITHOUT SCIATICA: Chronic | ICD-10-CM

## 2023-10-23 DIAGNOSIS — G89.29 CHRONIC BILATERAL LOW BACK PAIN WITHOUT SCIATICA: Chronic | ICD-10-CM

## 2023-10-23 DIAGNOSIS — Z86.19 HISTORY OF SHINGLES: ICD-10-CM

## 2023-10-23 PROCEDURE — 99213 OFFICE O/P EST LOW 20 MIN: CPT

## 2023-10-23 RX ORDER — ONDANSETRON 8 MG/1
8 TABLET, ORALLY DISINTEGRATING ORAL EVERY 8 HOURS PRN
COMMUNITY

## 2023-10-23 RX ORDER — UBROGEPANT 100 MG/1
1 TABLET ORAL DAILY PRN
Qty: 16 TABLET | Refills: 5 | Status: SHIPPED | OUTPATIENT
Start: 2023-10-23

## 2023-10-23 RX ORDER — LIDOCAINE 50 MG/G
3 PATCH TOPICAL DAILY
Qty: 30 PATCH | Refills: 5 | Status: SHIPPED | OUTPATIENT
Start: 2023-10-23

## 2023-10-23 RX ORDER — OLANZAPINE 2.5 MG/1
2.5 TABLET ORAL
COMMUNITY
Start: 2023-10-20

## 2023-10-23 RX ORDER — HYDROCODONE BITARTRATE AND ACETAMINOPHEN 5; 325 MG/1; MG/1
1 TABLET ORAL EVERY 8 HOURS PRN
Qty: 90 TABLET | Refills: 0 | Status: SHIPPED | OUTPATIENT
Start: 2023-10-23 | End: 2023-11-22

## 2023-10-23 RX ORDER — BUTALBITAL, ACETAMINOPHEN AND CAFFEINE 50; 325; 40 MG/1; MG/1; MG/1
1 TABLET ORAL DAILY PRN
Qty: 30 TABLET | Refills: 2 | Status: SHIPPED | OUTPATIENT
Start: 2023-10-23

## 2023-10-23 ASSESSMENT — ENCOUNTER SYMPTOMS
BACK PAIN: 1
VISUAL CHANGE: 1
PHOTOPHOBIA: 1
BLURRED VISION: 1
NAUSEA: 1
CONSTIPATION: 0
SCALP TENDERNESS: 1

## 2023-10-23 ASSESSMENT — PAIN DESCRIPTION - ORIENTATION
ORIENTATION: LOWER
ORIENTATION_2: LOWER

## 2023-10-23 ASSESSMENT — PAIN DESCRIPTION - PAIN TYPE
TYPE: CHRONIC PAIN
TYPE_2: CHRONIC PAIN

## 2023-10-23 ASSESSMENT — PAIN DESCRIPTION - LOCATION
LOCATION_2: NECK
LOCATION: BACK
LOCATION_3: HEAD

## 2023-10-23 ASSESSMENT — PAIN DESCRIPTION - INTENSITY
RATING_2: 3
RATING_3: 8

## 2023-10-23 ASSESSMENT — PAIN SCALES - GENERAL: PAINLEVEL_OUTOF10: 0

## 2023-10-23 NOTE — TELEPHONE ENCOUNTER
1. Intractable migraine without aura and without status migrainosus  - butalbital-acetaminophen-caffeine (FIORICET, ESGIC) -40 MG per tablet; Take 1 tablet by mouth daily as needed for Headaches (may repeat in 4 hours x 1 no more than 2 per day)  Dispense: 30 tablet; Refill: 2  - Ubrogepant (UBRELVY) 100 MG TABS; Take 1 tablet by mouth daily as needed (Migraine may repeat x 1 within 4 hours) Take 1 tablet at the start of a migraine. May repeat x1 within 4 hours. Do not exceed 9 days per month  Dispense: 16 tablet; Refill: 5    2. Chronic bilateral low back pain without sciatica  - HYDROcodone-acetaminophen (NORCO) 5-325 MG per tablet; Take 1 tablet by mouth every 8 hours as needed for Pain for up to 30 days. (May fill 9/15/23)  Dispense: 90 tablet; Refill: 0    3. History of shingles  - lidocaine (LIDODERM) 5 %; Place 3 patches onto the skin daily 12 hours on, 12 hours off. Dispense: 30 patch; Refill: 5      Requested Prescriptions     Signed Prescriptions Disp Refills    butalbital-acetaminophen-caffeine (FIORICET, ESGIC) -40 MG per tablet 30 tablet 2     Sig: Take 1 tablet by mouth daily as needed for Headaches (may repeat in 4 hours x 1 no more than 2 per day)     Authorizing Provider: Page CEDILLO    HYDROcodone-acetaminophen (NORCO) 5-325 MG per tablet 90 tablet 0     Sig: Take 1 tablet by mouth every 8 hours as needed for Pain for up to 30 days. (May fill 9/15/23)     Authorizing Provider: Page CEDILLO    Ubrogepant (UBRELVY) 100 MG TABS 16 tablet 5     Sig: Take 1 tablet by mouth daily as needed (Migraine may repeat x 1 within 4 hours) Take 1 tablet at the start of a migraine. May repeat x1 within 4 hours. Do not exceed 9 days per month     Authorizing Provider: Page CEDILLO    lidocaine (LIDODERM) 5 % 30 patch 5     Sig: Place 3 patches onto the skin daily 12 hours on, 12 hours off.      Authorizing Provider: Roula Pardo       Continue medication with refill sent at appointment

## 2023-10-23 NOTE — PROGRESS NOTES
Clinic Documentation      Education Provided:  [x] Review of Audelia Farah  [] Agreement Review  [x] PEG Score Calculated [] PHQ Score Calculated [] ORT Score Calculated    [] Compliance Issues Discussed [] Cognitive Behavior Needs [x] Exercise [] Review of Test [] Financial Issues  [x] Tobacco/Alcohol Use Reviewed [x] Teaching [] New Patient [] Picture Obtained    Physician Plan:  [] Outgoing Referral  [] Pharmacy Consult  [] Test Ordered [x] Prescription Ordered/Changed   [] Obtained Test Results / Consult Notes        Complete if patient is withholding blood thinner for procedure     Blood Thinner Patient is currently taking:      [] Plavix (Hold for 7 days)  [] Aspirin (Hold for 5 days)     [] Pletal (Hold for 2 days)  [] Pradaxa (Hold for 3 days)    [] Effient (Hold for 7 days)  [] Xarelto (Hold for 2 days)    [] Eliquis (Hold for 2 days)  [] Brilinta (Hold for 7 days)    [] Coumadin (Hold for 5 days) - (INR needs to be drawn the day prior to procedure- INR < 2.0)    [] Aggrenox (Hold for 7 days)        [] Patient will stop medication on their own.    [] Blood Thinner Form Faxed for approval to hold.    Provider form faxed to:     Assessment Completed by:  Electronically signed by Renea Ferreira RN on 10/23/2023 at 9:07 AM

## 2023-10-23 NOTE — PROGRESS NOTES
[facility]    Office Visit    Patient Name: Laurence Perez    MR #: [id number]    Account [de-identified]    : 1975    Age: 51 y.o. Sex: female    Date: 10/23/2023    PCP: Rosalie Lovell MD    Chief Complaint:   No chief complaint on file. History of Present Illness: The patient is a 51 y.o. female who is for procedure follow-up. Patient had Botox for migraines and anterocollis on     Bilateral lumbar trigger points with bilateral SI injections on      Patient had bilateral knee injections on      Back Pain  This is a chronic problem. The current episode started more than 1 year ago. The problem occurs intermittently. The problem has been waxing and waning since onset. The pain is present in the sacro-iliac and lumbar spine. The quality of the pain is described as aching and cramping. The symptoms are aggravated by bending, sitting and standing. Associated symptoms include headaches. Neck Pain   This is a chronic problem. The current episode started more than 1 year ago. The problem occurs constantly. The problem has been waxing and waning. The quality of the pain is described as cramping and aching. Associated symptoms include headaches, photophobia and a visual change. Headache   This is a chronic problem. The current episode started more than 1 year ago. The problem occurs daily. The problem has been waxing and waning. The pain is located in the Bilateral, frontal and occipital region. The quality of the pain is described as squeezing, stabbing, aching, sharp and throbbing (burning). Associated symptoms include back pain, blurred vision, nausea, neck pain, phonophobia, photophobia, scalp tenderness and a visual change. The symptoms are aggravated by emotional stress, activity, bright light and noise.        Screening Tools:    PEG Score: ***     Last PEG Score: 4     Annual ORT Score: 1     Annual PHQ Score: 3    Current Pain Assessment       Past

## 2023-11-03 DIAGNOSIS — Z86.19 HISTORY OF SHINGLES: ICD-10-CM

## 2023-11-03 RX ORDER — LIDOCAINE 50 MG/G
3 PATCH TOPICAL DAILY
Refills: 2 | OUTPATIENT
Start: 2023-11-03

## 2023-11-21 ENCOUNTER — TELEPHONE (OUTPATIENT)
Dept: PAIN MANAGEMENT | Age: 48
End: 2023-11-21

## 2023-11-21 NOTE — TELEPHONE ENCOUNTER
CALLED [employer] .  TRIED CONTACTING -FRANK    SPOKE WITH RAHEEL AT [employer] TERMED 10/23  REFERENCE # [id number]

## 2023-11-22 ENCOUNTER — TELEPHONE (OUTPATIENT)
Dept: PAIN MANAGEMENT | Age: 48
End: 2023-11-22

## 2023-11-28 DIAGNOSIS — M54.50 CHRONIC BILATERAL LOW BACK PAIN WITHOUT SCIATICA: Chronic | ICD-10-CM

## 2023-11-28 DIAGNOSIS — G89.29 CHRONIC BILATERAL LOW BACK PAIN WITHOUT SCIATICA: Chronic | ICD-10-CM

## 2023-11-28 RX ORDER — HYDROCODONE BITARTRATE AND ACETAMINOPHEN 5; 325 MG/1; MG/1
1 TABLET ORAL EVERY 8 HOURS PRN
Qty: 90 TABLET | Refills: 0 | Status: SHIPPED | OUTPATIENT
Start: 2023-12-28 | End: 2024-01-27

## 2023-11-28 RX ORDER — HYDROCODONE BITARTRATE AND ACETAMINOPHEN 5; 325 MG/1; MG/1
1 TABLET ORAL EVERY 8 HOURS PRN
Qty: 90 TABLET | Refills: 0 | Status: SHIPPED | OUTPATIENT
Start: 2023-11-28 | End: 2023-12-28

## 2023-12-06 ENCOUNTER — HOSPITAL ENCOUNTER (OUTPATIENT)
Dept: PAIN MANAGEMENT | Age: 48
Discharge: HOME | End: 2023-12-06
Payer: MEDICARE

## 2023-12-06 VITALS
HEART RATE: 81 BPM | TEMPERATURE: 97.5 F | DIASTOLIC BLOOD PRESSURE: 72 MMHG | RESPIRATION RATE: 16 BRPM | OXYGEN SATURATION: 100 % | SYSTOLIC BLOOD PRESSURE: 131 MMHG

## 2023-12-06 DIAGNOSIS — M25.561 CHRONIC PAIN OF BOTH KNEES: Primary | ICD-10-CM

## 2023-12-06 DIAGNOSIS — M25.562 CHRONIC PAIN OF BOTH KNEES: Primary | ICD-10-CM

## 2023-12-06 DIAGNOSIS — G89.29 CHRONIC PAIN OF BOTH KNEES: Primary | ICD-10-CM

## 2023-12-06 PROCEDURE — 6360000002 HC RX W HCPCS

## 2023-12-06 PROCEDURE — 20611 DRAIN/INJ JOINT/BURSA W/US: CPT

## 2023-12-06 PROCEDURE — 20611 DRAIN/INJ JOINT/BURSA W/US: CPT | Performed by: NURSE PRACTITIONER

## 2023-12-06 PROCEDURE — 2500000003 HC RX 250 WO HCPCS

## 2023-12-06 RX ORDER — LIDOCAINE HYDROCHLORIDE 10 MG/ML
4 INJECTION, SOLUTION EPIDURAL; INFILTRATION; INTRACAUDAL; PERINEURAL ONCE
Status: DISCONTINUED | OUTPATIENT
Start: 2023-12-06 | End: 2023-12-08 | Stop reason: HOSPADM

## 2023-12-06 RX ORDER — ETHYL CHLORIDE 100 %
AEROSOL, SPRAY (ML) TOPICAL PRN
Status: DISCONTINUED | OUTPATIENT
Start: 2023-12-06 | End: 2023-12-08 | Stop reason: HOSPADM

## 2023-12-06 NOTE — DISCHARGE INSTRUCTIONS
[facility]  Post Procedure Discharge Instructions        YOU HAVE HAD THE FOLLOWING PROCEDURE:                                  [] Occipital Nerve Blocks  [] CTS wrist injection(s)  [x] Knee Injection(s)         [] Shoulder Injection(s)   [] Elbow Injection(s)     [] Botox Injection  [] Cervical Trigger Point Injections    [] Thoracic Trigger Point Injections    [] Lumbar Trigger Point Injections  [] Piriformis Trigger Point Injections  [] SI Joint Injection(s)     [] Trochanteric Bursa Injection(s)       [] Ankle Injection(s)   [] Plantar Fasciitis   []  ______________  Injection(s) [] Botox []  Migraines [] Spasticity    YOU HAVE RECEIVED THE FOLLOWING MEDICATIONS IN YOUR INJECTION(s)  [x] Lidocaine [] Bupivacaine   [] DepoMedrol (steroid) [] Decadron (steroid)  []  Kenalog (steroid)   [] Toradol  [] Supartz [x] Tiajuana Alvin    [] Botox        PATIENT INFORMATION:   You may experience the following symptoms after your procedure. These symptoms are normal and should not cause concern: You may have an increase in your pain. This may last 24 - 48 hours after your procedure. You may have no change in the pain that you had prior to your injection(s). You may have weakness or numbness in your affected extremity. If this occurs, this may last until numbing the medication wears off. REPORT THE FOLLOWING SYMPTOMS TO YOUR DOCTOR:  Redness, swelling or drainage at the injection site(s)  Unusual pain that interferes with your normal activities of daily living. OTHER INSTRUCTIONS:    [x] I will apply ice to the injection site(s) for at least 24 hours after the procedure. I will rotate the ice on for 20 minutes and off for 20 minutes for at least 24 hours. [x] I will not apply heat for at least 48 hours and I will not take a hot bath or shower for at least 24 hours.      [x] I understand that if Lidocaine or Bupivacaine was used in my injection(s) that the injection site(s)

## 2023-12-13 ENCOUNTER — HOSPITAL ENCOUNTER (OUTPATIENT)
Dept: PAIN MANAGEMENT | Age: 48
Discharge: HOME | End: 2023-12-13
Payer: MEDICARE

## 2023-12-13 VITALS
RESPIRATION RATE: 18 BRPM | HEART RATE: 78 BPM | TEMPERATURE: 97.5 F | SYSTOLIC BLOOD PRESSURE: 112 MMHG | DIASTOLIC BLOOD PRESSURE: 80 MMHG | OXYGEN SATURATION: 100 %

## 2023-12-13 DIAGNOSIS — M43.6 ANTEROCOLLIS: Primary | ICD-10-CM

## 2023-12-13 DIAGNOSIS — M79.18 MYOFASCIAL PAIN: Chronic | ICD-10-CM

## 2023-12-13 DIAGNOSIS — M62.838 CERVICAL PARASPINAL MUSCLE SPASM: ICD-10-CM

## 2023-12-13 DIAGNOSIS — M62.830 SPASM OF MUSCLE OF LOWER BACK: ICD-10-CM

## 2023-12-13 PROCEDURE — 64616 CHEMODENERV MUSC NECK DYSTON: CPT | Performed by: NURSE PRACTITIONER

## 2023-12-13 PROCEDURE — 64646 CHEMODENERV TRUNK MUSC 1-5: CPT

## 2023-12-13 PROCEDURE — 2580000003 HC RX 258

## 2023-12-13 PROCEDURE — 64616 CHEMODENERV MUSC NECK DYSTON: CPT

## 2023-12-13 PROCEDURE — 95874 GUIDE NERV DESTR NEEDLE EMG: CPT

## 2023-12-13 PROCEDURE — 95874 GUIDE NERV DESTR NEEDLE EMG: CPT | Performed by: NURSE PRACTITIONER

## 2023-12-13 PROCEDURE — A4216 STERILE WATER/SALINE, 10 ML: HCPCS

## 2023-12-13 PROCEDURE — 6360000002 HC RX W HCPCS

## 2023-12-13 RX ORDER — TIZANIDINE 4 MG/1
TABLET ORAL
Qty: 60 TABLET | Refills: 2 | Status: SHIPPED | OUTPATIENT
Start: 2023-12-13

## 2023-12-13 RX ORDER — SODIUM CHLORIDE 9 MG/ML
6 INJECTION INTRAVENOUS ONCE
Status: DISCONTINUED | OUTPATIENT
Start: 2023-12-13 | End: 2023-12-15 | Stop reason: HOSPADM

## 2023-12-13 NOTE — PROGRESS NOTES
Procedure:  Level of Consciousness: [x]Alert [x]Oriented []Disoriented []Lethargic  Anxiety Level: [x]Calm []Anxious []Depressed []Other  Skin: [x]Warm [x]Dry []Cool []Moist []Intact []Other  Cardiovascular: []Palpitations: [x]Never []Occasionally []Frequently  Chest Pain: [x]No []Yes  Respiratory:  [x]Unlabored []Labored []Cough ([] Productive []Unproductive)  HCG Required: [x]No []Yes   Results: []Negative []Positive  Knowledge Level:        [x]Patient/Other verbalized understanding of pre-procedure instructions. [x]Assessment of post-op care needs (transportation, responsible caregiver)        [x]Able to discuss health care problems and how to deal with it.   Factors that Affect Teaching:        Language Barrier: [x]No []Yes - why:        Hearing Loss:        [x]No []Yes            Corrective Device:  []Ye[x]s No        Vision Loss:           []No [x]Yes            Corrective Device:  [x]Yes []No        Memory Loss:       [x]No []Yes            []Short Term []Long Term  Motivational Level:  [x]Asks Questions                  []Extremely Anxious       [x]Seems Interested               []Seems Uninterested                  []Denies need for Education  Risk for Injury:  [x]Patient oriented to person, place and time  []History of frequent falls/loss of balance  Nutritional:  []Change in appetite   []Weight Gain   []Weight Loss  Functional:  []Requires assistance with ADL's

## 2023-12-13 NOTE — DISCHARGE INSTRUCTIONS
[facility] Physical [facility] Medicine  Post Procedure Discharge Instructions        YOU HAVE HAD THE FOLLOWING PROCEDURE:                                  [] Occipital Nerve Blocks  [] CTS wrist injection(s)  [] Knee Injection(s)         [] Shoulder Injection(s)   [] Elbow Injection(s)     [x] Botox Injection  [] Cervical Trigger Point Injections    [] Thoracic Trigger Point Injections    [] Lumbar Trigger Point Injections  [] Piriformis Trigger Point Injections  [] SI Joint Injection(s)     [] Trochanteric Bursa Injection(s)       [] Ankle Injection(s)   [] Plantar Fasciitis   []  ______________  Injection(s) [x] Botox []  Migraines [x] Spasticity    YOU HAVE RECEIVED THE FOLLOWING MEDICATIONS IN YOUR INJECTION(s)  [] Lidocaine [] Bupivacaine   [] DepoMedrol (steroid) [] Decadron (steroid)  []  Kenalog (steroid)   [] Toradol  [] Supartz [] Adrian Patella    [x] Botox        PATIENT INFORMATION:   You may experience the following symptoms after your procedure. These symptoms are normal and should not cause concern: You may have an increase in your pain. This may last 24 - 48 hours after your procedure. You may have no change in the pain that you had prior to your injection(s). You may have weakness or numbness in your affected extremity. If this occurs, this may last until numbing the medication wears off. REPORT THE FOLLOWING SYMPTOMS TO YOUR DOCTOR:  Redness, swelling or drainage at the injection site(s)  Unusual pain that interferes with your normal activities of daily living. OTHER INSTRUCTIONS:    [x] I will apply ice to the injection site(s) for at least 24 hours after the procedure. I will rotate the ice on for 20 minutes and off for 20 minutes for at least 24 hours. [x] I will not apply heat for at least 48 hours and I will not take a hot bath or shower for at least 24 hours.      [] I understand that if Lidocaine or Bupivacaine was used in my injection(s) that the injection site(s)

## 2023-12-20 ENCOUNTER — TELEPHONE (OUTPATIENT)
Dept: PAIN MANAGEMENT | Age: 48
End: 2023-12-20

## 2024-01-10 ENCOUNTER — HOSPITAL ENCOUNTER (OUTPATIENT)
Dept: PAIN MANAGEMENT | Age: 49
Discharge: HOME | End: 2024-01-10
Payer: MEDICARE

## 2024-01-10 VITALS
RESPIRATION RATE: 16 BRPM | DIASTOLIC BLOOD PRESSURE: 75 MMHG | OXYGEN SATURATION: 96 % | HEART RATE: 88 BPM | TEMPERATURE: 97.1 F | SYSTOLIC BLOOD PRESSURE: 124 MMHG

## 2024-01-10 DIAGNOSIS — M79.18 MYOFASCIAL PAIN: Primary | Chronic | ICD-10-CM

## 2024-01-10 PROCEDURE — 64615 CHEMODENERV MUSC MIGRAINE: CPT

## 2024-01-10 PROCEDURE — A4216 STERILE WATER/SALINE, 10 ML: HCPCS

## 2024-01-10 PROCEDURE — 2580000003 HC RX 258

## 2024-01-10 PROCEDURE — 6360000002 HC RX W HCPCS

## 2024-01-10 RX ORDER — SODIUM CHLORIDE 9 MG/ML
4.2 INJECTION INTRAVENOUS ONCE
Status: DISCONTINUED | OUTPATIENT
Start: 2024-01-10 | End: 2024-01-12 | Stop reason: HOSPADM

## 2024-01-10 NOTE — PROGRESS NOTES
[facility]   [address]  [place] [address]  [phone] p  [phone]    Procedure:  Level of Consciousness: [x]Alert [x]Oriented []Disoriented []Lethargic  Anxiety Level: [x]Calm []Anxious []Depressed []Other  Skin: [x]Warm [x]Dry []Cool []Moist []Intact []Other  Cardiovascular: []Palpitations: [x]Never []Occasionally []Frequently  Chest Pain: [x]No []Yes  Respiratory:  [x]Unlabored []Labored []Cough ([] Productive []Unproductive)  HCG Required: [x]No []Yes   Results: []Negative []Positive  Knowledge Level:        [x]Patient/Other verbalized understanding of pre-procedure instructions.        [x]Assessment of post-op care needs (transportation, responsible caregiver)        [x]Able to discuss health care problems and how to deal with it.  Factors that Affect Teaching:        Language Barrier: [x]No []Yes - why:        Hearing Loss:        [x]No []Yes            Corrective Device:  []Yes []No        Vision Loss:           []No [x]Yes            Corrective Device:  [x]Yes []No        Memory Loss:       [x]No []Yes            []Short Term []Long Term  Motivational Level:  [x]Asks Questions                  []Extremely Anxious       [x]Seems Interested               []Seems Uninterested                  [x]Denies need for Education  Risk for Injury:  [x]Patient oriented to person, place and time  []History of frequent falls/loss of balance  Nutritional:  []Change in appetite   []Weight Gain   []Weight Loss  Functional:  []Requires assistance with ADL's      Migraine  Follow up Assessment:  Patient experiences 11 headaches per month  Patient states that he/she has 9 headaches out of 30 days each month.  Patient states that he/she has 2 migraines out of 30 days each month.  Patient has experienced a  reduction in Migraine headaches less than 15 days per month [x]Yes []No  Patient has experienced a reduction in Migraine hours [x]Yes []No

## 2024-01-10 NOTE — DISCHARGE INSTRUCTIONS
[facility] Physical [facility] Medicine  Post Procedure Discharge Instructions        YOU HAVE HAD THE FOLLOWING PROCEDURE:                                  [] Occipital Nerve Blocks  [] CTS wrist injection(s)  [] Knee Injection(s)         [] Shoulder Injection(s)   [] Elbow Injection(s)     [x] Botox Injection  [] Cervical Trigger Point Injections    [] Thoracic Trigger Point Injections    [] Lumbar Trigger Point Injections  [] Piriformis Trigger Point Injections  [] SI Joint Injection(s)     [] Trochanteric Bursa Injection(s)       [] Ankle Injection(s)   [] Plantar Fasciitis   []  ______________  Injection(s) [x] Botox [x]  Migraines [] Spasticity    YOU HAVE RECEIVED THE FOLLOWING MEDICATIONS IN YOUR INJECTION(s)  [] Lidocaine [] Bupivacaine   [] DepoMedrol (steroid) [] Decadron (steroid)  []  Kenalog (steroid)   [] Toradol  [] Supartz [] Zilretta    [x] Botox        PATIENT INFORMATION:   You may experience the following symptoms after your procedure. These symptoms are normal and should not cause concern:    You may have an increase in your pain. This may last 24 - 48 hours after your procedure.  You may have no change in the pain that you had prior to your injection(s).  You may have weakness or numbness in your affected extremity. If this occurs, this may last until numbing the medication wears off.     REPORT THE FOLLOWING SYMPTOMS TO YOUR DOCTOR:  Redness, swelling or drainage at the injection site(s)  Unusual pain that interferes with your normal activities of daily living.    OTHER INSTRUCTIONS:    [x] I will apply ice to the injection site(s) for at least 24 hours after the procedure. I will rotate the ice on for 20 minutes and off for 20 minutes for at least 24 hours.    [x] I will not apply heat for at least 48 hours and I will not take a hot bath or shower for at least 24 hours.     [] I understand that if Lidocaine or Bupivacaine was used in my injection(s) that the injection site(s)

## 2024-01-11 NOTE — PROCEDURES
[facility]    Patient Name: Yesenia Ernandez    : 1975                    Age: 48 y.o.    Sex: female    Date: 2024    Pre-op Diagnosis: Chronic Migraines    Post-op Diagnosis: Chronic Migraines    Procedure: Botox injections x 155 units (45 units wasted)    Performing Procedure: AMNA Caruso    No data found.    Previous Injections:  Patient had 9/2 headaches/migraines over the past month.     Indications:    Yesenia Ernandez has been treated for problems with chronic migraine headaches. The patient was taken through a conservative course of treatment without complete resolution of symptoms. Botox injection therapy was offered and after the risks and benefits of the procedure were explained to the patient, we had agreed to proceed.    The patient was taken to the procedure room and placed in the seated position. The following muscles were identified using palpation and standard landmarks. These muscles were marked and prepped with alcohol. A total of 155 units were injected after careful aspiration and the following distribution bilaterally:  10 units in 2 sites, procerus 5 units in one site, frontalis 20 units in 4 sites, temporalis 40 unit in 8 sites, occipitals 30 units in 6 sites, cervical paraspinals 20 units in 4 sites, and trapezius 30 units in 6 sites.    Discharge:  The patient tolerated the procedure well. There were no complications during the procedure and the patient was discharged home with discharge instructions.    The patient has been instructed to contact the office should there be any complications or questions to arise between today and their next appointment.    Plan:    Patient to follow up in the office in about 6 weeks.     AMNA Vargas - CNP, 2024 at 7:08 PM

## 2024-01-17 ENCOUNTER — TELEPHONE (OUTPATIENT)
Dept: PAIN MANAGEMENT | Age: 49
End: 2024-01-17

## 2024-01-17 NOTE — TELEPHONE ENCOUNTER
Call placed to patient to follow up from Botox injections for migraine.  Patient states that injections are working well and she has had reduction in migraine headaches since the injection.

## 2024-02-06 DIAGNOSIS — G89.29 CHRONIC BILATERAL LOW BACK PAIN WITHOUT SCIATICA: Chronic | ICD-10-CM

## 2024-02-06 DIAGNOSIS — M54.50 CHRONIC BILATERAL LOW BACK PAIN WITHOUT SCIATICA: Chronic | ICD-10-CM

## 2024-02-08 RX ORDER — HYDROCODONE BITARTRATE AND ACETAMINOPHEN 5; 325 MG/1; MG/1
1 TABLET ORAL EVERY 8 HOURS PRN
Qty: 90 TABLET | Refills: 0 | Status: SHIPPED | OUTPATIENT
Start: 2024-02-08 | End: 2024-03-09

## 2024-03-04 DIAGNOSIS — M54.50 CHRONIC BILATERAL LOW BACK PAIN WITHOUT SCIATICA: Chronic | ICD-10-CM

## 2024-03-04 DIAGNOSIS — G89.29 CHRONIC BILATERAL LOW BACK PAIN WITHOUT SCIATICA: Chronic | ICD-10-CM

## 2024-03-05 RX ORDER — HYDROCODONE BITARTRATE AND ACETAMINOPHEN 5; 325 MG/1; MG/1
1 TABLET ORAL EVERY 8 HOURS PRN
Qty: 90 TABLET | Refills: 0 | Status: SHIPPED | OUTPATIENT
Start: 2024-03-09 | End: 2024-04-08

## 2024-03-26 ENCOUNTER — HOSPITAL ENCOUNTER (OUTPATIENT)
Dept: PAIN MANAGEMENT | Age: 49
Discharge: HOME | End: 2024-03-26
Payer: MEDICARE

## 2024-03-26 VITALS
BODY MASS INDEX: 29.59 KG/M2 | HEART RATE: 90 BPM | SYSTOLIC BLOOD PRESSURE: 126 MMHG | HEIGHT: 63 IN | OXYGEN SATURATION: 99 % | WEIGHT: 167 LBS | RESPIRATION RATE: 16 BRPM | DIASTOLIC BLOOD PRESSURE: 88 MMHG | TEMPERATURE: 97.7 F

## 2024-03-26 PROCEDURE — 99214 OFFICE O/P EST MOD 30 MIN: CPT

## 2024-03-26 RX ORDER — TRAZODONE HYDROCHLORIDE 50 MG/1
50 TABLET ORAL
COMMUNITY
Start: 2024-03-20

## 2024-03-26 ASSESSMENT — ENCOUNTER SYMPTOMS
CONSTIPATION: 0
VISUAL CHANGE: 1
PHOTOPHOBIA: 1
NAUSEA: 1
BLURRED VISION: 1
BACK PAIN: 1
SCALP TENDERNESS: 1

## 2024-03-26 ASSESSMENT — PAIN DESCRIPTION - INTENSITY
RATING_2: 6
RATING_3: 6

## 2024-03-26 ASSESSMENT — PAIN DESCRIPTION - LOCATION
LOCATION: BACK
LOCATION_3: HEAD
LOCATION_2: NECK

## 2024-03-26 ASSESSMENT — PAIN DESCRIPTION - ORIENTATION
ORIENTATION: LOWER
ORIENTATION_2: LOWER

## 2024-03-26 ASSESSMENT — PAIN DESCRIPTION - PAIN TYPE
TYPE: CHRONIC PAIN
TYPE_2: CHRONIC PAIN

## 2024-03-26 ASSESSMENT — PAIN SCALES - GENERAL: PAINLEVEL_OUTOF10: 5

## 2024-03-26 NOTE — PROGRESS NOTES
Clinic Documentation      Education Provided:  [x] Review of Julian  [] Agreement Review  [x] PEG Score Calculated [] PHQ Score Calculated [] ORT Score Calculated    [] Compliance Issues Discussed [] Cognitive Behavior Needs [x] Exercise [] Review of Test [] Financial Issues  [x] Tobacco/Alcohol Use Reviewed [x] Teaching [] New Patient [] Picture Obtained    Physician Plan:  [] Outgoing Referral  [] Pharmacy Consult  [x] Test Ordered [x] Prescription Ordered/Changed   [] Obtained Test Results / Consult Notes        Complete if patient is withholding blood thinner for procedure     Blood Thinner Patient is currently taking:      [] Plavix (Hold for 7 days)  [] Aspirin (Hold for 5 days)     [] Pletal (Hold for 2 days)  [] Pradaxa (Hold for 3 days)    [] Effient (Hold for 7 days)  [] Xarelto (Hold for 2 days)    [] Eliquis (Hold for 2 days)  [] Brilinta (Hold for 7 days)    [] Coumadin (Hold for 5 days) - (INR needs to be drawn the day prior to procedure- INR < 2.0)    [] Aggrenox (Hold for 7 days)        [] Patient will stop medication on their own.    [] Blood Thinner Form Faxed for approval to hold.   Provider form faxed to:     Assessment Completed by:  Electronically signed by Alondra Varela RN on 3/26/2024 at 4:53 PM  
Although, I have reviewed the note for such errors, some may still exist.

## 2024-03-28 DIAGNOSIS — M54.50 CHRONIC BILATERAL LOW BACK PAIN WITHOUT SCIATICA: Chronic | ICD-10-CM

## 2024-03-28 DIAGNOSIS — G89.29 CHRONIC BILATERAL LOW BACK PAIN WITHOUT SCIATICA: Chronic | ICD-10-CM

## 2024-03-28 DIAGNOSIS — G43.019 INTRACTABLE MIGRAINE WITHOUT AURA AND WITHOUT STATUS MIGRAINOSUS: Chronic | ICD-10-CM

## 2024-03-28 RX ORDER — BUTALBITAL, ACETAMINOPHEN AND CAFFEINE 50; 325; 40 MG/1; MG/1; MG/1
1 TABLET ORAL DAILY PRN
Qty: 30 TABLET | Refills: 2 | Status: SHIPPED | OUTPATIENT
Start: 2024-03-28

## 2024-03-28 RX ORDER — HYDROCODONE BITARTRATE AND ACETAMINOPHEN 5; 325 MG/1; MG/1
1 TABLET ORAL EVERY 8 HOURS PRN
Qty: 90 TABLET | Refills: 0 | Status: SHIPPED | OUTPATIENT
Start: 2024-04-08 | End: 2024-05-08

## 2024-04-10 ENCOUNTER — HOSPITAL ENCOUNTER (OUTPATIENT)
Dept: PAIN MANAGEMENT | Age: 49
Discharge: HOME | End: 2024-04-10
Payer: MEDICARE

## 2024-04-10 VITALS — RESPIRATION RATE: 18 BRPM | HEART RATE: 89 BPM | OXYGEN SATURATION: 100 % | TEMPERATURE: 97.6 F

## 2024-04-10 PROCEDURE — 95874 GUIDE NERV DESTR NEEDLE EMG: CPT | Performed by: NURSE PRACTITIONER

## 2024-04-10 PROCEDURE — 64615 CHEMODENERV MUSC MIGRAINE: CPT

## 2024-04-10 PROCEDURE — 6360000002 HC RX W HCPCS

## 2024-04-10 PROCEDURE — 64616 CHEMODENERV MUSC NECK DYSTON: CPT | Performed by: NURSE PRACTITIONER

## 2024-04-10 PROCEDURE — 95874 GUIDE NERV DESTR NEEDLE EMG: CPT

## 2024-04-10 PROCEDURE — 2580000003 HC RX 258

## 2024-04-10 PROCEDURE — 64616 CHEMODENERV MUSC NECK DYSTON: CPT

## 2024-04-10 PROCEDURE — A4216 STERILE WATER/SALINE, 10 ML: HCPCS

## 2024-04-10 RX ORDER — SODIUM CHLORIDE 9 MG/ML
5 INJECTION, SOLUTION INTRAMUSCULAR; INTRAVENOUS; SUBCUTANEOUS ONCE
Status: DISCONTINUED | OUTPATIENT
Start: 2024-04-10 | End: 2024-04-12 | Stop reason: HOSPADM

## 2024-04-10 NOTE — DISCHARGE INSTRUCTIONS
[facility]  Post Procedure Discharge Instructions        YOU HAVE HAD THE FOLLOWING PROCEDURE:                                  [] Occipital Nerve Blocks  [] CTS wrist injection(s)  [] Knee Injection(s)         [] Shoulder Injection(s)   [] Elbow Injection(s)     [] Botox Injection  [] Cervical Trigger Point Injections    [] Thoracic Trigger Point Injections    [] Lumbar Trigger Point Injections  [] Piriformis Trigger Point Injections  [] SI Joint Injection(s)     [] Trochanteric Bursa Injection(s)       [] Ankle Injection(s)   [] Plantar Fasciitis   []  ______________  Injection(s) [x] Botox [x]  Migraines [x] Spasticity    YOU HAVE RECEIVED THE FOLLOWING MEDICATIONS IN YOUR INJECTION(s)  [] Lidocaine [] Bupivacaine   [] DepoMedrol (steroid) [] Decadron (steroid)  []  Kenalog (steroid)   [] Toradol  [] Supartz [] Zilretta    [x] Botox        PATIENT INFORMATION:   You may experience the following symptoms after your procedure. These symptoms are normal and should not cause concern:    You may have an increase in your pain. This may last 24 - 48 hours after your procedure.  You may have no change in the pain that you had prior to your injection(s).  You may have weakness or numbness in your affected extremity. If this occurs, this may last until numbing the medication wears off.     REPORT THE FOLLOWING SYMPTOMS TO YOUR DOCTOR:  Redness, swelling or drainage at the injection site(s)  Unusual pain that interferes with your normal activities of daily living.    OTHER INSTRUCTIONS:    [] I will apply ice to the injection site(s) for at least 24 hours after the procedure. I will rotate the ice on for 20 minutes and off for 20 minutes for at least 24 hours.    [] I will not apply heat for at least 48 hours and I will not take a hot bath or shower for at least 24 hours.     [] I understand that if Lidocaine or Bupivacaine was used in my injection(s) that the injection site(s)

## 2024-04-10 NOTE — PROCEDURES
separate sites (25);   Right Levator Scapulae 25 units in 1 separate sites (25);   EMG was used to confirm targeted muscle was injected.    Discharge:  The patient tolerated the procedure well. There were no complications during the procedure and the patient was discharged home with discharge instructions.    The patient has been instructed to contact the office should there be any complications or questions to arise between today and their next appointment.    Plan:  Patient will keep previously scheduled follow up.    Emilee Warren, AMNA - CNP, 4/10/2024 at 11:51 AM

## 2024-04-10 NOTE — PROGRESS NOTES
[facility]   [address]  [place] [address]  [phone] p  [phone]    Procedure:  Level of Consciousness: [x]Alert [x]Oriented []Disoriented []Lethargic  Anxiety Level: [x]Calm []Anxious []Depressed []Other  Skin: [x]Warm [x]Dry []Cool []Moist []Intact []Other  Cardiovascular: [x]Palpitations: [x]Never []Occasionally []Frequently  Chest Pain: [x]No []Yes  Respiratory:  [x]Unlabored []Labored []Cough ([] Productive []Unproductive)  HCG Required: [x]No []Yes   Results: []Negative []Positive  Knowledge Level:        [x]Patient/Other verbalized understanding of pre-procedure instructions.        [x]Assessment of post-op care needs (transportation, responsible caregiver)        [x]Able to discuss health care problems and how to deal with it.  Factors that Affect Teaching:        Language Barrier: [x]No []Yes - why:        Hearing Loss:        [x]No []Yes            Corrective Device:  []Yes [x]No        Vision Loss:           []No [x]Yes            Corrective Device:  [x]Yes []No        Memory Loss:       [x]No []Yes            []Short Term []Long Term  Motivational Level:  [x]Asks Questions                  []Extremely Anxious       [x]Seems Interested               []Seems Uninterested                  [x]Denies need for Education  Risk for Injury:  [x]Patient oriented to person, place and time  []History of frequent falls/loss of balance  Nutritional:  []Change in appetite   []Weight Gain   []Weight Loss  Functional:  []Requires assistance with ADL's      Migraine  Follow up Assessment:  Patient experiences 6 headaches per month  Patient states that he/she has 6 headaches out of 30 days each month.  Patient states that he/she has 1 migraines out of 30 days each month.  Patient has experienced a  reduction in Migraine headaches less than 15 days per month [x]Yes []No  Patient has experienced a reduction in Migraine hours [x]Yes []No

## 2024-04-17 ENCOUNTER — HOSPITAL ENCOUNTER (OUTPATIENT)
Dept: PAIN MANAGEMENT | Age: 49
Discharge: HOME | End: 2024-04-17
Payer: MEDICARE

## 2024-04-17 VITALS
RESPIRATION RATE: 18 BRPM | TEMPERATURE: 97.3 F | OXYGEN SATURATION: 100 % | SYSTOLIC BLOOD PRESSURE: 135 MMHG | DIASTOLIC BLOOD PRESSURE: 75 MMHG | HEART RATE: 91 BPM

## 2024-04-17 DIAGNOSIS — M79.18 MYOFASCIAL PAIN: Primary | Chronic | ICD-10-CM

## 2024-04-17 PROCEDURE — 20553 NJX 1/MLT TRIGGER POINTS 3/>: CPT

## 2024-04-17 PROCEDURE — 2500000003 HC RX 250 WO HCPCS

## 2024-04-17 PROCEDURE — 20553 NJX 1/MLT TRIGGER POINTS 3/>: CPT | Performed by: NURSE PRACTITIONER

## 2024-04-17 PROCEDURE — 20611 DRAIN/INJ JOINT/BURSA W/US: CPT

## 2024-04-17 PROCEDURE — 6360000002 HC RX W HCPCS

## 2024-04-17 RX ORDER — TRIAMCINOLONE ACETONIDE 40 MG/ML
80 INJECTION, SUSPENSION INTRA-ARTICULAR; INTRAMUSCULAR ONCE
Status: DISCONTINUED | OUTPATIENT
Start: 2024-04-17 | End: 2024-04-19 | Stop reason: HOSPADM

## 2024-04-17 RX ORDER — BUPIVACAINE HYDROCHLORIDE 5 MG/ML
2 INJECTION, SOLUTION EPIDURAL; INTRACAUDAL ONCE
Status: DISCONTINUED | OUTPATIENT
Start: 2024-04-17 | End: 2024-04-19 | Stop reason: HOSPADM

## 2024-04-17 RX ORDER — ETHYL CHLORIDE 100 %
AEROSOL, SPRAY (ML) TOPICAL PRN
Status: DISCONTINUED | OUTPATIENT
Start: 2024-04-17 | End: 2024-04-19 | Stop reason: HOSPADM

## 2024-04-17 RX ORDER — LIDOCAINE HYDROCHLORIDE 10 MG/ML
2 INJECTION, SOLUTION EPIDURAL; INFILTRATION; INTRACAUDAL; PERINEURAL ONCE
Status: DISCONTINUED | OUTPATIENT
Start: 2024-04-17 | End: 2024-04-19 | Stop reason: HOSPADM

## 2024-04-17 RX ORDER — LIDOCAINE HYDROCHLORIDE 10 MG/ML
10 INJECTION, SOLUTION EPIDURAL; INFILTRATION; INTRACAUDAL; PERINEURAL ONCE
Status: DISCONTINUED | OUTPATIENT
Start: 2024-04-17 | End: 2024-04-19 | Stop reason: HOSPADM

## 2024-04-17 RX ORDER — BUPIVACAINE HYDROCHLORIDE 5 MG/ML
10 INJECTION, SOLUTION EPIDURAL; INTRACAUDAL ONCE
Status: DISCONTINUED | OUTPATIENT
Start: 2024-04-17 | End: 2024-04-19 | Stop reason: HOSPADM

## 2024-04-17 NOTE — DISCHARGE INSTRUCTIONS
[facility]  Post Procedure Discharge Instructions        YOU HAVE HAD THE FOLLOWING PROCEDURE:                                  [] Occipital Nerve Blocks  [] CTS wrist injection(s)  [] Knee Injection(s)         [] Shoulder Injection(s)   [] Elbow Injection(s)     [] Botox Injection  [] Cervical Trigger Point Injections    [] Thoracic Trigger Point Injections    [x] Lumbar Trigger Point Injections  [] Piriformis Trigger Point Injections  [x] SI Joint Injection(s)     [] Trochanteric Bursa Injection(s)       [] Ankle Injection(s)   [] Plantar Fasciitis   []  ______________  Injection(s) [] Botox []  Migraines [] Spasticity    YOU HAVE RECEIVED THE FOLLOWING MEDICATIONS IN YOUR INJECTION(s)  [x] Lidocaine [x] Bupivacaine   [] DepoMedrol (steroid) [] Decadron (steroid)  [x]  Kenalog (steroid)   [] Toradol  [] Supartz [] Zilretta    [] Botox        PATIENT INFORMATION:   You may experience the following symptoms after your procedure. These symptoms are normal and should not cause concern:    You may have an increase in your pain. This may last 24 - 48 hours after your procedure.  You may have no change in the pain that you had prior to your injection(s).  You may have weakness or numbness in your affected extremity. If this occurs, this may last until numbing the medication wears off.     REPORT THE FOLLOWING SYMPTOMS TO YOUR DOCTOR:  Redness, swelling or drainage at the injection site(s)  Unusual pain that interferes with your normal activities of daily living.    OTHER INSTRUCTIONS:    [x] I will apply ice to the injection site(s) for at least 24 hours after the procedure. I will rotate the ice on for 20 minutes and off for 20 minutes for at least 24 hours.    [x] I will not apply heat for at least 48 hours and I will not take a hot bath or shower for at least 24 hours.     [x] I understand that if Lidocaine or Bupivacaine was used in my injection(s) that the injection site(s)

## 2024-04-17 NOTE — PROCEDURES
[facility]    Patient Name: Yesenia Ernandez    : 1975    Age: 49 y.o.    Sex: female    Date: 2024    Pre-op Diagnosis: bilateral  Sacroiliac Joint(s) Dysfunction/ Sacroiliitis    Post-op Diagnosis: bilateral  Sacroiliac Joint(s) Dysfunction/ Sacroliliits    Procedure: Ultrasound Guided Injection of  bilateral Sacroiliac Joint(s)     Performing Procedure:  SANDRO Caruso    Patient Vitals for the past 24 hrs:   BP Temp Temp src Pulse Resp SpO2   24 0957 135/75 97.3 °F (36.3 °C) Temporal 91 18 100 %       bilateral  Sacroiliac Joint(s)     Description of Procedure:    After voluntary, informed and signed consent obtained the patient was placed in a prone position. Appropriate time out was obtained per policy. The Sacroiliac Joint(s) was palpated for area of maximal tenderness. The area was prepped in an aseptic fashion with CHG swab. The ultrasound transducer was used to confirm the appropriate location. The skin was sprayed with Gebauer's Solution. Under aseptic technique and direct ultrasound visualization a 22 gauge 3 inch spinal needle was introduced into the Sacroiliac Joint(s). Patient was asked if any new pain was radiating down the ipsilateral lower extremity. If patient noted radiating pain the needle was readjusted until radiating pain was gone. After a negative aspiration, a solution of 1 ml of 0.5% Marcaine Plain and 1 ml of 1% Lidocaine Plain and 1 ml of Kenalog (40 mg/ml) was injected into the Sacroiliac Joint(s). The needle was withdrawn and a sterile dressing applied. If this was a bilateral procedure, the same steps were followed on the opposite side.     Pre-op Diagnosis: Myofascial Pain/ Muscle Spasms    Post-op Diagnosis: Myofascial Pain/ Muscle Spasms    Procedure: Lumbar Trigger Point Injections    Performing Procedure: SANDRO Caruso    Patient Vitals for the past 24 hrs:   BP Temp Temp src Pulse Resp SpO2

## 2024-04-24 ENCOUNTER — TELEPHONE (OUTPATIENT)
Dept: PAIN MANAGEMENT | Age: 49
End: 2024-04-24

## 2024-04-24 DIAGNOSIS — M79.18 MYOFASCIAL PAIN: Primary | Chronic | ICD-10-CM

## 2024-04-29 DIAGNOSIS — M79.18 MYOFASCIAL PAIN: Chronic | ICD-10-CM

## 2024-04-29 DIAGNOSIS — M62.838 CERVICAL PARASPINAL MUSCLE SPASM: ICD-10-CM

## 2024-04-29 DIAGNOSIS — G89.29 CHRONIC BILATERAL LOW BACK PAIN WITHOUT SCIATICA: Chronic | ICD-10-CM

## 2024-04-29 DIAGNOSIS — M62.830 SPASM OF MUSCLE OF LOWER BACK: ICD-10-CM

## 2024-04-29 DIAGNOSIS — M54.50 CHRONIC BILATERAL LOW BACK PAIN WITHOUT SCIATICA: Chronic | ICD-10-CM

## 2024-04-30 RX ORDER — HYDROCODONE BITARTRATE AND ACETAMINOPHEN 5; 325 MG/1; MG/1
1 TABLET ORAL EVERY 8 HOURS PRN
Qty: 90 TABLET | Refills: 0 | Status: SHIPPED | OUTPATIENT
Start: 2024-05-08 | End: 2024-06-07

## 2024-04-30 RX ORDER — TIZANIDINE 4 MG/1
TABLET ORAL
Qty: 60 TABLET | Refills: 2 | Status: SHIPPED | OUTPATIENT
Start: 2024-04-30

## 2024-05-01 ENCOUNTER — HOSPITAL ENCOUNTER (OUTPATIENT)
Dept: PAIN MANAGEMENT | Age: 49
Discharge: HOME | End: 2024-05-01
Payer: MEDICARE

## 2024-05-01 VITALS
TEMPERATURE: 97.7 F | HEART RATE: 91 BPM | RESPIRATION RATE: 16 BRPM | DIASTOLIC BLOOD PRESSURE: 79 MMHG | SYSTOLIC BLOOD PRESSURE: 136 MMHG | OXYGEN SATURATION: 98 %

## 2024-05-01 DIAGNOSIS — M79.18 MYOFASCIAL PAIN: Primary | Chronic | ICD-10-CM

## 2024-05-01 PROCEDURE — 2500000003 HC RX 250 WO HCPCS

## 2024-05-01 PROCEDURE — 20611 DRAIN/INJ JOINT/BURSA W/US: CPT | Performed by: NURSE PRACTITIONER

## 2024-05-01 PROCEDURE — 20611 DRAIN/INJ JOINT/BURSA W/US: CPT

## 2024-05-01 PROCEDURE — 6360000002 HC RX W HCPCS

## 2024-05-01 RX ORDER — ETHYL CHLORIDE 100 %
AEROSOL, SPRAY (ML) TOPICAL PRN
Status: DISCONTINUED | OUTPATIENT
Start: 2024-05-01 | End: 2024-05-03 | Stop reason: HOSPADM

## 2024-05-01 RX ORDER — LIDOCAINE HYDROCHLORIDE 10 MG/ML
4 INJECTION, SOLUTION EPIDURAL; INFILTRATION; INTRACAUDAL; PERINEURAL ONCE
Status: DISCONTINUED | OUTPATIENT
Start: 2024-05-01 | End: 2024-05-03 | Stop reason: HOSPADM

## 2024-05-01 RX ORDER — DEXTROAMPHETAMINE SACCHARATE, AMPHETAMINE ASPARTATE, DEXTROAMPHETAMINE SULFATE AND AMPHETAMINE SULFATE 5; 5; 5; 5 MG/1; MG/1; MG/1; MG/1
20 TABLET ORAL 2 TIMES DAILY
COMMUNITY
Start: 2024-04-19

## 2024-05-01 NOTE — PROCEDURES
[facility]    Patient Name: Yesenia Ernandez    : 1975    Age: 49 y.o.    Sex: female    Date: May 1, 2024    Preop Diagnosis: Osteoarthritis of bilateral Knee(s)    Postop Diagnosis: Osteoarthritis of  bilateral  Knee(s)      Procedure: Injection of  bilateral  Knee(s) with US confirmation    Performing Procedure: Emilee Warren AGACNP-BC    Patient Vitals for the past 24 hrs:   BP Temp Temp src Pulse Resp SpO2   24 0956 136/79 97.7 °F (36.5 °C) Temporal 91 16 98 %       Description of Procedure:    After a brief physical assessment and failure to improve with conservative measures the patient presented for an injection of the bilateral Knee(s). The indications, limitations and possible complications were discussed with the patient and the patient elected to proceed with the procedure.    bilateral Knee(s)    After voluntary, informed and signed consent obtained the patient was placed in a seated position. Appropriate time out was obtained per policy. The knee was palpated at the anterior aspect of the knee to locate the patellar tendon. The projected injection site is approximately 1 cm medially or laterally of the midpoint of the tendon. Usually, there is a depression. The area was marked. The ultrasound transducer was used to confirm the appropriate location. The skin prepped in an aseptic fashion with CHG swab and then sprayed with Gebauer's Solution. Under aseptic technique 22 gauge 1 1/2 inch needle was then introduced into the Knee(s). After a negative aspiration, a solution of 1 ml of 0.5% Marcaine Plain, 1 ml of 1% Lidocaine Plain and     [] 1 ml of Toradol (30 mg/ml)     [] 1 ml of Kenalog (40mg/ml)    []  0.5 ml of DepoMedrol (80mg/ml)    [] 1 ml of DepoMedrol (80mg/ml)    OR    [x] After negative aspiration, 1 ml of 1% Lidocaine Plain was injected into the knee prior to the 5 ml reconstituted Zilretta, which consists of  4 ml prepackaged solution, mixed

## 2024-05-01 NOTE — DISCHARGE INSTRUCTIONS
[facility]  Post Procedure Discharge Instructions        YOU HAVE HAD THE FOLLOWING PROCEDURE:                                  [] Occipital Nerve Blocks  [] CTS wrist injection(s)  [x] Knee Injection(s)         [] Shoulder Injection(s)   [] Elbow Injection(s)     [] Botox Injection  [] Cervical Trigger Point Injections    [] Thoracic Trigger Point Injections    [] Lumbar Trigger Point Injections  [] Piriformis Trigger Point Injections  [] SI Joint Injection(s)     [] Trochanteric Bursa Injection(s)       [] Ankle Injection(s)   [] Plantar Fasciitis   []  ______________  Injection(s) [] Botox []  Migraines [] Spasticity    YOU HAVE RECEIVED THE FOLLOWING MEDICATIONS IN YOUR INJECTION(s)  [x] Lidocaine [] Bupivacaine   [] DepoMedrol (steroid) [] Decadron (steroid)  []  Kenalog (steroid)   [] Toradol  [] Supartz [x] Zilretta    [] Botox        PATIENT INFORMATION:   You may experience the following symptoms after your procedure. These symptoms are normal and should not cause concern:    You may have an increase in your pain. This may last 24 - 48 hours after your procedure.  You may have no change in the pain that you had prior to your injection(s).  You may have weakness or numbness in your affected extremity. If this occurs, this may last until numbing the medication wears off.     REPORT THE FOLLOWING SYMPTOMS TO YOUR DOCTOR:  Redness, swelling or drainage at the injection site(s)  Unusual pain that interferes with your normal activities of daily living.    OTHER INSTRUCTIONS:    [x] I will apply ice to the injection site(s) for at least 24 hours after the procedure. I will rotate the ice on for 20 minutes and off for 20 minutes for at least 24 hours.    [x] I will not apply heat for at least 48 hours and I will not take a hot bath or shower for at least 24 hours.     [x] I understand that if Lidocaine or Bupivacaine was used in my injection(s) that the injection site(s)

## 2024-05-02 PROBLEM — F11.90 CHRONIC, CONTINUOUS USE OF OPIOIDS: Status: ACTIVE | Noted: 2024-05-02

## 2024-05-02 ASSESSMENT — ENCOUNTER SYMPTOMS
PHOTOPHOBIA: 1
NAUSEA: 1
SCALP TENDERNESS: 1
BLURRED VISION: 1
VISUAL CHANGE: 1
BACK PAIN: 1

## 2024-05-06 PROBLEM — M62.81 WEAKNESS OF TRUNK MUSCULATURE: Status: ACTIVE | Noted: 2024-05-06

## 2024-06-03 DIAGNOSIS — G89.29 CHRONIC BILATERAL LOW BACK PAIN WITHOUT SCIATICA: Chronic | ICD-10-CM

## 2024-06-03 DIAGNOSIS — M54.50 CHRONIC BILATERAL LOW BACK PAIN WITHOUT SCIATICA: Chronic | ICD-10-CM

## 2024-06-03 RX ORDER — HYDROCODONE BITARTRATE AND ACETAMINOPHEN 5; 325 MG/1; MG/1
1 TABLET ORAL EVERY 8 HOURS PRN
Qty: 90 TABLET | Refills: 0 | Status: SHIPPED | OUTPATIENT
Start: 2024-06-08 | End: 2024-07-08

## 2024-07-01 ENCOUNTER — HOSPITAL ENCOUNTER (OUTPATIENT)
Dept: PAIN MANAGEMENT | Age: 49
Discharge: HOME | End: 2024-07-01
Payer: MEDICARE

## 2024-07-01 VITALS
HEIGHT: 63 IN | HEART RATE: 82 BPM | OXYGEN SATURATION: 99 % | DIASTOLIC BLOOD PRESSURE: 80 MMHG | WEIGHT: 175 LBS | RESPIRATION RATE: 16 BRPM | SYSTOLIC BLOOD PRESSURE: 129 MMHG | BODY MASS INDEX: 31.01 KG/M2 | TEMPERATURE: 97 F

## 2024-07-01 DIAGNOSIS — M25.562 CHRONIC PAIN OF BOTH KNEES: ICD-10-CM

## 2024-07-01 DIAGNOSIS — M25.561 CHRONIC PAIN OF BOTH KNEES: ICD-10-CM

## 2024-07-01 DIAGNOSIS — Z86.19 HISTORY OF SHINGLES: ICD-10-CM

## 2024-07-01 DIAGNOSIS — M54.41 CHRONIC BILATERAL LOW BACK PAIN WITH BILATERAL SCIATICA: Primary | ICD-10-CM

## 2024-07-01 DIAGNOSIS — Z02.89 PAIN MEDICATION AGREEMENT SIGNED: ICD-10-CM

## 2024-07-01 DIAGNOSIS — M53.3 SI (SACROILIAC) JOINT DYSFUNCTION: Chronic | ICD-10-CM

## 2024-07-01 DIAGNOSIS — G89.29 CHRONIC PAIN OF BOTH KNEES: ICD-10-CM

## 2024-07-01 DIAGNOSIS — M62.830 SPASM OF MUSCLE OF LOWER BACK: ICD-10-CM

## 2024-07-01 DIAGNOSIS — M79.18 MYOFASCIAL PAIN: Chronic | ICD-10-CM

## 2024-07-01 DIAGNOSIS — M54.50 CHRONIC BILATERAL LOW BACK PAIN WITHOUT SCIATICA: Chronic | ICD-10-CM

## 2024-07-01 DIAGNOSIS — G43.019 INTRACTABLE MIGRAINE WITHOUT AURA AND WITHOUT STATUS MIGRAINOSUS: Chronic | ICD-10-CM

## 2024-07-01 DIAGNOSIS — G89.29 CHRONIC BILATERAL LOW BACK PAIN WITHOUT SCIATICA: Chronic | ICD-10-CM

## 2024-07-01 DIAGNOSIS — M62.838 MUSCLE SPASTICITY: ICD-10-CM

## 2024-07-01 DIAGNOSIS — M54.42 CHRONIC BILATERAL LOW BACK PAIN WITH BILATERAL SCIATICA: Primary | ICD-10-CM

## 2024-07-01 DIAGNOSIS — G89.29 CHRONIC BILATERAL LOW BACK PAIN WITH BILATERAL SCIATICA: Primary | ICD-10-CM

## 2024-07-01 DIAGNOSIS — M62.838 CERVICAL PARASPINAL MUSCLE SPASM: ICD-10-CM

## 2024-07-01 PROCEDURE — 99215 OFFICE O/P EST HI 40 MIN: CPT

## 2024-07-01 PROCEDURE — 99214 OFFICE O/P EST MOD 30 MIN: CPT

## 2024-07-01 RX ORDER — IBUPROFEN 800 MG/1
800 TABLET ORAL 3 TIMES DAILY PRN
Qty: 90 TABLET | Refills: 2 | Status: SHIPPED | OUTPATIENT
Start: 2024-07-01

## 2024-07-01 RX ORDER — METHOCARBAMOL 500 MG/1
500 TABLET, FILM COATED ORAL 3 TIMES DAILY
Qty: 90 TABLET | Refills: 2 | Status: SHIPPED | OUTPATIENT
Start: 2024-07-01 | End: 2024-09-29

## 2024-07-01 RX ORDER — LIDOCAINE 50 MG/G
3 PATCH TOPICAL DAILY
Qty: 30 PATCH | Refills: 2 | Status: SHIPPED | OUTPATIENT
Start: 2024-07-01

## 2024-07-01 RX ORDER — HYDROCODONE BITARTRATE AND ACETAMINOPHEN 5; 325 MG/1; MG/1
1 TABLET ORAL EVERY 8 HOURS PRN
Qty: 90 TABLET | Refills: 0 | Status: SHIPPED | OUTPATIENT
Start: 2024-07-08 | End: 2024-08-07

## 2024-07-01 ASSESSMENT — PAIN SCALES - GENERAL: PAINLEVEL_OUTOF10: 6

## 2024-07-01 ASSESSMENT — ENCOUNTER SYMPTOMS
SCALP TENDERNESS: 1
PHOTOPHOBIA: 1
NAUSEA: 1
CONSTIPATION: 0
BACK PAIN: 1
VISUAL CHANGE: 1
BLURRED VISION: 1

## 2024-07-01 ASSESSMENT — PAIN DESCRIPTION - INTENSITY
RATING_3: 3
RATING_2: 3

## 2024-07-01 ASSESSMENT — PAIN DESCRIPTION - ORIENTATION: ORIENTATION_2: LOWER

## 2024-07-01 ASSESSMENT — PAIN DESCRIPTION - LOCATION
LOCATION_3: HEAD
LOCATION_2: NECK
LOCATION: BACK

## 2024-07-01 ASSESSMENT — PAIN DESCRIPTION - PAIN TYPE
TYPE: CHRONIC PAIN
TYPE_2: CHRONIC PAIN

## 2024-07-01 NOTE — H&P
[facility]    History & Physical    Patient Name: Yesenia Ernandez    MR #: [id number]    Account #:[id number]    : 1975    Age: 49 y.o.    Sex: female    Date: 2024    PCP: Demond Burroughs MD    Chief Complaint:   Chief Complaint   Patient presents with    Back Pain     6/10    Neck Pain     3/10    Headache     3/10  Headaches: 12/30 days   Migraines: 3/30 days       History of Present Illness:   The patient is a 49 y.o. female who presents to the office for annual exam and procedure follow up.     Patient has been established with this office since 2018 with primary c/o back pain, neck pain, and migraines. Patient hx of Chiari Malformation.     Patient had Botox for Cervical Dystonia and Migraines spasticity on 4/10/2024  Patient obtained at least a 50% improvement with ROM and spasticity with a decrease in severity, in intensity and in aggravating symptoms compared to pre-treatment.     Patient had over 100 hours of migraine freedom per month with at least 50% reduction in mean headache days  per month with a decrease in severity, in intensity, and in aggravating symptoms compared to pre-treatment.     Patient has had improvement in quality of life with reduction in migraines and migraine symptoms.    Patient has had a decrease in the use of acute migraine medications such as triptians, NSAID'S, and CGRP inhibitors.     Patient continues to be evaluated for medication over use headaches.     Patient had Bilateral Sacroiliac Joint Injections under Ultrasound and Bilateral Lumbar Trigger Point Injections on 2024. Patient had at least 80-85% relief of pain from procedure(s) for at least 8 weeks. After injection, patient was able to increase activity. Patient had less pain from aggravating factors. Patient was able to decrease pain medication usage. Patient received enough pain relief from injections that the patient would like to repeat the injection(s).

## 2024-07-01 NOTE — TELEPHONE ENCOUNTER
1. Chronic bilateral low back pain without sciatica    2. History of shingles    3. Cervical paraspinal muscle spasm    4. Spasm of muscle of lower back    5. Myofascial pain      Requested Prescriptions     Pending Prescriptions Disp Refills    HYDROcodone-acetaminophen (NORCO) 5-325 MG per tablet 90 tablet 0     Sig: Take 1 tablet by mouth every 8 hours as needed for Pain for up to 30 days. May fill 07/08/24    ibuprofen (ADVIL;MOTRIN) 800 MG tablet 90 tablet 2     Sig: Take 1 tablet by mouth 3 times daily as needed for Pain    lidocaine (LIDODERM) 5 % 30 patch 2     Sig: Place 3 patches onto the skin daily 12 hours on, 12 hours off.       Continue medication with refill sent at appointment yes; refill sent to medical director at appointment yes, see refill encounter dated 7/1/2024    Electronically signed by AMNA Morrow CNP on 7/1/2024 at 1:46 PM

## 2024-07-02 DIAGNOSIS — G43.019 INTRACTABLE MIGRAINE WITHOUT AURA AND WITHOUT STATUS MIGRAINOSUS: Primary | Chronic | ICD-10-CM

## 2024-07-11 ENCOUNTER — HOSPITAL ENCOUNTER (OUTPATIENT)
Dept: PAIN MANAGEMENT | Age: 49
Discharge: HOME | End: 2024-07-11
Payer: MEDICARE

## 2024-07-11 ENCOUNTER — HOSPITAL ENCOUNTER (OUTPATIENT)
Dept: GENERAL RADIOLOGY | Age: 49
Discharge: HOME | End: 2024-07-11
Payer: MEDICARE

## 2024-07-11 VITALS
DIASTOLIC BLOOD PRESSURE: 80 MMHG | RESPIRATION RATE: 18 BRPM | OXYGEN SATURATION: 95 % | SYSTOLIC BLOOD PRESSURE: 127 MMHG | TEMPERATURE: 97.1 F | HEART RATE: 88 BPM

## 2024-07-11 DIAGNOSIS — G89.29 CHRONIC PAIN OF BOTH KNEES: ICD-10-CM

## 2024-07-11 DIAGNOSIS — G89.29 CHRONIC BILATERAL LOW BACK PAIN WITH BILATERAL SCIATICA: ICD-10-CM

## 2024-07-11 DIAGNOSIS — M25.562 CHRONIC PAIN OF BOTH KNEES: ICD-10-CM

## 2024-07-11 DIAGNOSIS — M25.561 CHRONIC PAIN OF BOTH KNEES: ICD-10-CM

## 2024-07-11 DIAGNOSIS — M53.3 SI (SACROILIAC) JOINT DYSFUNCTION: Chronic | ICD-10-CM

## 2024-07-11 DIAGNOSIS — M54.41 CHRONIC BILATERAL LOW BACK PAIN WITH BILATERAL SCIATICA: ICD-10-CM

## 2024-07-11 DIAGNOSIS — M54.42 CHRONIC BILATERAL LOW BACK PAIN WITH BILATERAL SCIATICA: ICD-10-CM

## 2024-07-11 DIAGNOSIS — G43.019 INTRACTABLE MIGRAINE WITHOUT AURA AND WITHOUT STATUS MIGRAINOSUS: Primary | Chronic | ICD-10-CM

## 2024-07-11 PROCEDURE — 72110 X-RAY EXAM L-2 SPINE 4/>VWS: CPT

## 2024-07-11 PROCEDURE — 64615 CHEMODENERV MUSC MIGRAINE: CPT

## 2024-07-11 PROCEDURE — 73560 X-RAY EXAM OF KNEE 1 OR 2: CPT

## 2024-07-11 PROCEDURE — 2580000003 HC RX 258

## 2024-07-11 PROCEDURE — 6360000002 HC RX W HCPCS

## 2024-07-11 RX ORDER — SODIUM CHLORIDE 9 MG/ML
5 INJECTION, SOLUTION INTRAMUSCULAR; INTRAVENOUS; SUBCUTANEOUS ONCE
Status: DISCONTINUED | OUTPATIENT
Start: 2024-07-11 | End: 2024-07-13 | Stop reason: HOSPADM

## 2024-07-11 NOTE — PROCEDURES
[facility]    Patient Name: Yesenia Ernandez    : 1975                    Age: 49 y.o.    Sex: female    Date: 2024    Pre-op Diagnosis: Chronic Migraines    Post-op Diagnosis: Chronic Migraines    Procedure: Botox injections x 155 units (45 units wasted)    Performing Procedure:  Julienne WOO    Patient Vitals for the past 24 hrs:   BP Temp Temp src Pulse Resp SpO2   24 0903 127/80 97.1 °F (36.2 °C) Temporal 88 18 95 %       Previous Injections:  Patient had 30/15 headaches/migraines over the past month.     Indications:    Yesenia Ernandez has been treated for problems with chronic migraine headaches. The patient was taken through a conservative course of treatment without complete resolution of symptoms. Botox injection therapy was offered and after the risks and benefits of the procedure were explained to the patient, we had agreed to proceed.    The patient was taken to the procedure room and placed in the seated position. The following muscles were identified using palpation and standard landmarks. These muscles were marked and prepped with alcohol. A total of 155 units were injected after careful aspiration and the following distribution bilaterally:  10 units in 2 sites, procerus 5 units in one site, frontalis 20 units in 4 sites, temporalis 40 unit in 8 sites, occipitals 30 units in 6 sites, cervical paraspinals 20 units in 4 sites, and trapezius 30 units in 6 sites.    Discharge:  The patient tolerated the procedure well. There were no complications during the procedure and the patient was discharged home with discharge instructions.    The patient has been instructed to contact the office should there be any complications or questions to arise between today and their next appointment.    Plan:  [] Will return to the office in   [] 1 month  [x] 10 weeks  [] 8 weeks   [] 12 weeks:  [] Procedure Follow-up   [] Office Visit

## 2024-07-11 NOTE — PROGRESS NOTES
[facility]   [address]  [place] [address]  [phone] p  [phone]    Procedure:  Level of Consciousness: [x]Alert [x]Oriented []Disoriented []Lethargic  Anxiety Level: [x]Calm []Anxious []Depressed []Other  Skin: [x]Warm [x]Dry []Cool []Moist []Intact []Other  Cardiovascular: [x]Palpitations: [x]Never []Occasionally []Frequently  Chest Pain: [x]No []Yes  Respiratory:  [x]Unlabored []Labored []Cough ([] Productive []Unproductive)  HCG Required: [x]No []Yes   Results: []Negative []Positive  Knowledge Level:        [x]Patient/Other verbalized understanding of pre-procedure instructions.        [x]Assessment of post-op care needs (transportation, responsible caregiver)        [x]Able to discuss health care problems and how to deal with it.  Factors that Affect Teaching:        Language Barrier: [x]No []Yes - why:        Hearing Loss:        [x]No []Yes            Corrective Device:  []Yes [x]No        Vision Loss:           []No [x]Yes            Corrective Device:  [x]Yes []No        Memory Loss:       [x]No []Yes            []Short Term []Long Term  Motivational Level:  [x]Asks Questions                  []Extremely Anxious       [x]Seems Interested               []Seems Uninterested                  [x]Denies need for Education  Risk for Injury:  [x]Patient oriented to person, place and time  []History of frequent falls/loss of balance  Nutritional:  []Change in appetite   []Weight Gain   []Weight Loss  Functional:  []Requires assistance with ADL's      Migraine  Follow up Assessment:  Patient experiences 12 headaches per month  Patient states that he/she has 12 headaches out of 30 days each month.  Patient states that he/she has 3 migraines out of 30 days each month.  Patient has experienced a  reduction in Migraine headaches less than 15 days per month [x]Yes []No  Patient has experienced a reduction in Migraine hours [x]Yes []No

## 2024-07-11 NOTE — DISCHARGE INSTRUCTIONS
[facility]  Post Procedure Discharge Instructions        YOU HAVE HAD THE FOLLOWING PROCEDURE:                                  [] Occipital Nerve Blocks  [] CTS wrist injection(s)  [] Knee Injection(s)         [] Shoulder Injection(s)   [] Elbow Injection(s)     [] Botox Injection  [] Cervical Trigger Point Injections    [] Thoracic Trigger Point Injections    [] Lumbar Trigger Point Injections  [] Piriformis Trigger Point Injections  [] SI Joint Injection(s)     [] Trochanteric Bursa Injection(s)       [] Ankle Injection(s)   [] Plantar Fasciitis   []  ______________  Injection(s) [x] Botox [x]  Migraines [] Spasticity    YOU HAVE RECEIVED THE FOLLOWING MEDICATIONS IN YOUR INJECTION(s)  [] Lidocaine [] Bupivacaine   [] DepoMedrol (steroid) [] Decadron (steroid)  []  Kenalog (steroid)   [] Toradol  [] Supartz [] Zilretta    [x] Botox        PATIENT INFORMATION:   You may experience the following symptoms after your procedure. These symptoms are normal and should not cause concern:    You may have an increase in your pain. This may last 24 - 48 hours after your procedure.  You may have no change in the pain that you had prior to your injection(s).  You may have weakness or numbness in your affected extremity. If this occurs, this may last until numbing the medication wears off.     REPORT THE FOLLOWING SYMPTOMS TO YOUR DOCTOR:  Redness, swelling or drainage at the injection site(s)  Unusual pain that interferes with your normal activities of daily living.    OTHER INSTRUCTIONS:    [] I will apply ice to the injection site(s) for at least 24 hours after the procedure. I will rotate the ice on for 20 minutes and off for 20 minutes for at least 24 hours.    [] I will not apply heat for at least 48 hours and I will not take a hot bath or shower for at least 24 hours.     [] I understand that if Lidocaine or Bupivacaine was used in my injection(s) that the injection site(s) will

## 2024-07-15 ENCOUNTER — TELEPHONE (OUTPATIENT)
Dept: PAIN MANAGEMENT | Age: 49
End: 2024-07-15

## 2024-07-25 ENCOUNTER — HOSPITAL ENCOUNTER (OUTPATIENT)
Dept: PAIN MANAGEMENT | Age: 49
Discharge: HOME | End: 2024-07-25
Payer: MEDICARE

## 2024-07-25 VITALS
OXYGEN SATURATION: 100 % | TEMPERATURE: 97.4 F | HEART RATE: 88 BPM | DIASTOLIC BLOOD PRESSURE: 85 MMHG | RESPIRATION RATE: 18 BRPM | SYSTOLIC BLOOD PRESSURE: 143 MMHG

## 2024-07-25 DIAGNOSIS — M25.562 CHRONIC PAIN OF BOTH KNEES: Primary | ICD-10-CM

## 2024-07-25 DIAGNOSIS — M25.561 CHRONIC PAIN OF BOTH KNEES: Primary | ICD-10-CM

## 2024-07-25 DIAGNOSIS — G89.29 CHRONIC PAIN OF BOTH KNEES: Primary | ICD-10-CM

## 2024-07-25 DIAGNOSIS — M62.830 SPASM OF MUSCLE OF LOWER BACK: ICD-10-CM

## 2024-07-25 PROCEDURE — 20611 DRAIN/INJ JOINT/BURSA W/US: CPT

## 2024-07-25 PROCEDURE — 2500000003 HC RX 250 WO HCPCS

## 2024-07-25 PROCEDURE — 6360000002 HC RX W HCPCS

## 2024-07-25 PROCEDURE — 20553 NJX 1/MLT TRIGGER POINTS 3/>: CPT

## 2024-07-25 RX ORDER — BUPIVACAINE HYDROCHLORIDE 5 MG/ML
10 INJECTION, SOLUTION EPIDURAL; INTRACAUDAL ONCE
Status: DISCONTINUED | OUTPATIENT
Start: 2024-07-25 | End: 2024-07-27 | Stop reason: HOSPADM

## 2024-07-25 RX ORDER — LIDOCAINE HYDROCHLORIDE 10 MG/ML
10 INJECTION, SOLUTION EPIDURAL; INFILTRATION; INTRACAUDAL; PERINEURAL ONCE
Status: DISCONTINUED | OUTPATIENT
Start: 2024-07-25 | End: 2024-07-27 | Stop reason: HOSPADM

## 2024-07-25 RX ORDER — LIDOCAINE HYDROCHLORIDE 10 MG/ML
4 INJECTION, SOLUTION EPIDURAL; INFILTRATION; INTRACAUDAL; PERINEURAL ONCE
Status: DISCONTINUED | OUTPATIENT
Start: 2024-07-25 | End: 2024-07-27 | Stop reason: HOSPADM

## 2024-07-25 RX ORDER — ETHYL CHLORIDE 100 %
AEROSOL, SPRAY (ML) TOPICAL PRN
Status: DISCONTINUED | OUTPATIENT
Start: 2024-07-25 | End: 2024-07-27 | Stop reason: HOSPADM

## 2024-07-25 NOTE — DISCHARGE INSTRUCTIONS
[facility]  Post Procedure Discharge Instructions        YOU HAVE HAD THE FOLLOWING PROCEDURE:                                  [] Occipital Nerve Blocks  [] CTS wrist injection(s)  [x] Knee Injection(s)         [] Shoulder Injection(s)   [] Elbow Injection(s)     [] Botox Injection  [] Cervical Trigger Point Injections    [] Thoracic Trigger Point Injections    [x] Lumbar Trigger Point Injections  [] Piriformis Trigger Point Injections  [] SI Joint Injection(s)     [] Trochanteric Bursa Injection(s)       [] Ankle Injection(s)   [] Plantar Fasciitis   []  ______________  Injection(s) [] Botox []  Migraines [] Spasticity    YOU HAVE RECEIVED THE FOLLOWING MEDICATIONS IN YOUR INJECTION(s)  [x] Lidocaine [x] Bupivacaine   [] DepoMedrol (steroid) [] Decadron (steroid)  []  Kenalog (steroid)   [] Toradol  [] Supartz [x] Zilretta    [] Botox        PATIENT INFORMATION:   You may experience the following symptoms after your procedure. These symptoms are normal and should not cause concern:    You may have an increase in your pain. This may last 24 - 48 hours after your procedure.  You may have no change in the pain that you had prior to your injection(s).  You may have weakness or numbness in your affected extremity. If this occurs, this may last until numbing the medication wears off.     REPORT THE FOLLOWING SYMPTOMS TO YOUR DOCTOR:  Redness, swelling or drainage at the injection site(s)  Unusual pain that interferes with your normal activities of daily living.    OTHER INSTRUCTIONS:    [x] I will apply ice to the injection site(s) for at least 24 hours after the procedure. I will rotate the ice on for 20 minutes and off for 20 minutes for at least 24 hours.    [x] I will not apply heat for at least 48 hours and I will not take a hot bath or shower for at least 24 hours.     [x] I understand that if Lidocaine or Bupivacaine was used in my injection(s) that the injection site(s)

## 2024-07-25 NOTE — PROCEDURES
[facility]    Patient Name: Yesenia Ernandez    : 1975    Age: 49 y.o.    Sex: female    Date: 2024    Preop Diagnosis: Osteoarthritis of bilateral Knee(s)    Postop Diagnosis: Osteoarthritis of  bilateral  Knee(s)      Procedure: Injection of  bilateral  Knee(s) with US confirmation    Performing Procedure: Julienne Arriola APRN - CNP    Patient Vitals for the past 24 hrs:   BP Temp Temp src Pulse Resp SpO2   24 1126 (!) 143/85 97.4 °F (36.3 °C) Temporal 88 18 100 %       Description of Procedure:    After a brief physical assessment and failure to improve with conservative measures the patient presented for an injection of the bilateral Knee(s). The indications, limitations and possible complications were discussed with the patient and the patient elected to proceed with the procedure.    bilateral Knee(s)    After voluntary, informed and signed consent obtained the patient was placed in a seated position. Appropriate time out was obtained per policy. The knee was palpated at the anterior aspect of the knee to locate the patellar tendon. The projected injection site is approximately 1 cm medially or laterally of the midpoint of the tendon. Usually, there is a depression. The area was marked. The ultrasound transducer was used to confirm the appropriate location. The skin prepped in an aseptic fashion with CHG swab and then sprayed with Gebauer's Solution. Under aseptic technique 22 gauge 1 1/2 inch needle was then introduced into the Knee(s). After a negative aspiration, a solution of 1 ml of 0.5% Marcaine Plain, 1 ml of 1% Lidocaine Plain and     [] 1 ml of Toradol (30 mg/ml)     [] 1 ml of Kenalog (40mg/ml)    []  0.5 ml of DepoMedrol (80mg/ml)    [] 1 ml of DepoMedrol (80mg/ml)    OR    [x] After negative aspiration, 1 ml of 1% Lidocaine Plain was injected into the knee prior to the 5 ml reconstituted Zilretta, which consists of  4 ml prepackaged solution,

## 2024-07-30 DIAGNOSIS — M54.50 CHRONIC BILATERAL LOW BACK PAIN WITHOUT SCIATICA: Chronic | ICD-10-CM

## 2024-07-30 DIAGNOSIS — G89.29 CHRONIC BILATERAL LOW BACK PAIN WITHOUT SCIATICA: Chronic | ICD-10-CM

## 2024-08-05 RX ORDER — HYDROCODONE BITARTRATE AND ACETAMINOPHEN 5; 325 MG/1; MG/1
1 TABLET ORAL EVERY 8 HOURS PRN
Qty: 90 TABLET | Refills: 0 | Status: SHIPPED | OUTPATIENT
Start: 2024-08-07 | End: 2024-09-06

## 2024-08-06 ENCOUNTER — HOSPITAL ENCOUNTER (OUTPATIENT)
Dept: PAIN MANAGEMENT | Age: 49
Discharge: HOME | End: 2024-08-06
Payer: MEDICARE

## 2024-08-06 VITALS
TEMPERATURE: 96.2 F | HEART RATE: 96 BPM | RESPIRATION RATE: 18 BRPM | SYSTOLIC BLOOD PRESSURE: 141 MMHG | OXYGEN SATURATION: 98 % | DIASTOLIC BLOOD PRESSURE: 81 MMHG

## 2024-08-06 DIAGNOSIS — M79.18 MYOFASCIAL PAIN: Primary | Chronic | ICD-10-CM

## 2024-08-06 PROCEDURE — 6360000002 HC RX W HCPCS

## 2024-08-06 PROCEDURE — 64616 CHEMODENERV MUSC NECK DYSTON: CPT

## 2024-08-06 PROCEDURE — 2580000003 HC RX 258

## 2024-08-06 RX ORDER — SODIUM CHLORIDE 9 MG/ML
4.5 INJECTION, SOLUTION INTRAMUSCULAR; INTRAVENOUS; SUBCUTANEOUS ONCE
Status: DISCONTINUED | OUTPATIENT
Start: 2024-08-06 | End: 2024-08-08 | Stop reason: HOSPADM

## 2024-08-06 NOTE — INTERVAL H&P NOTE
Update History & Physical    The patient's History and Physical  was reviewed with the patient and I examined the patient. There was no change. The surgical site was confirmed by the patient and me.     Plan: The risks, benefits, expected outcome, and alternative to the recommended procedure have been discussed with the patient. Patient understands and wants to proceed with the procedure.     Electronically signed by Da Briones MD on 8/6/2024 at 3:13 PM

## 2024-08-06 NOTE — PROGRESS NOTES
Procedure:  Level of Consciousness: [x]Alert [x]Oriented []Disoriented []Lethargic  Anxiety Level: [x]Calm []Anxious []Depressed []Other  Skin: []Warm [x]Dry []Cool []Moist []Intact []Other  Cardiovascular: [x]Palpitations: []Never []Occasionally []Frequently  Chest Pain: [x]No []Yes  Respiratory:  [x]Unlabored []Labored []Cough ([] Productive []Unproductive)  HCG Required: [x]No []Yes   Results: []Negative []Positive  Knowledge Level:        [x]Patient/Other verbalized understanding of pre-procedure instructions.        [x]Assessment of post-op care needs (transportation, responsible caregiver)        [x]Able to discuss health care problems and how to deal with it.  Factors that Affect Teaching:        Language Barrier: [x]No []Yes - why:        Hearing Loss:        [x]No []Yes            Corrective Device:  []Yes [x]No        Vision Loss:           []No [x]Yes            Corrective Device:  [x]Yes []No        Memory Loss:       [x]No []Yes            []Short Term []Long Term  Motivational Level:  [x]Asks Questions                  []Extremely Anxious       [x]Seems Interested               []Seems Uninterested                  []Denies need for Education  Risk for Injury:  [x]Patient oriented to person, place and time  []History of frequent falls/loss of balance  Nutritional:  []Change in appetite   []Weight Gain   []Weight Loss  Functional:  []Requires assistance with ADL's

## 2024-09-03 ENCOUNTER — HOSPITAL ENCOUNTER (OUTPATIENT)
Dept: PAIN MANAGEMENT | Age: 49
Discharge: HOME | End: 2024-09-03
Payer: MEDICARE

## 2024-09-03 VITALS
RESPIRATION RATE: 18 BRPM | HEART RATE: 85 BPM | TEMPERATURE: 98.2 F | SYSTOLIC BLOOD PRESSURE: 139 MMHG | DIASTOLIC BLOOD PRESSURE: 70 MMHG | OXYGEN SATURATION: 98 %

## 2024-09-03 PROCEDURE — 6360000002 HC RX W HCPCS

## 2024-09-03 PROCEDURE — G0260 INJ FOR SACROILIAC JT ANESTH: HCPCS

## 2024-09-03 PROCEDURE — 2580000003 HC RX 258

## 2024-09-03 PROCEDURE — 2500000003 HC RX 250 WO HCPCS

## 2024-09-03 RX ORDER — METHYLPREDNISOLONE ACETATE 80 MG/ML
80 INJECTION, SUSPENSION INTRA-ARTICULAR; INTRALESIONAL; INTRAMUSCULAR; SOFT TISSUE ONCE
Status: DISCONTINUED | OUTPATIENT
Start: 2024-09-03 | End: 2024-09-05 | Stop reason: HOSPADM

## 2024-09-03 RX ORDER — LIDOCAINE HYDROCHLORIDE 10 MG/ML
7 INJECTION, SOLUTION EPIDURAL; INFILTRATION; INTRACAUDAL; PERINEURAL ONCE
Status: DISCONTINUED | OUTPATIENT
Start: 2024-09-03 | End: 2024-09-05 | Stop reason: HOSPADM

## 2024-09-03 RX ORDER — SODIUM CHLORIDE 9 MG/ML
3 INJECTION, SOLUTION INTRAMUSCULAR; INTRAVENOUS; SUBCUTANEOUS ONCE
Status: DISCONTINUED | OUTPATIENT
Start: 2024-09-03 | End: 2024-09-05 | Stop reason: HOSPADM

## 2024-09-03 RX ORDER — BUPIVACAINE HYDROCHLORIDE 5 MG/ML
2 INJECTION, SOLUTION EPIDURAL; INTRACAUDAL ONCE
Status: DISCONTINUED | OUTPATIENT
Start: 2024-09-03 | End: 2024-09-05 | Stop reason: HOSPADM

## 2024-09-03 ASSESSMENT — PAIN - FUNCTIONAL ASSESSMENT: PAIN_FUNCTIONAL_ASSESSMENT: 0-10

## 2024-09-03 NOTE — INTERVAL H&P NOTE
Update History & Physical    The patient's History and Physical  was reviewed with the patient and I examined the patient. There was no change. The surgical site was confirmed by the patient and me.     Plan: The risks, benefits, expected outcome, and alternative to the recommended procedure have been discussed with the patient. Patient understands and wants to proceed with the procedure.     Electronically signed by Da rBiones MD on 9/3/2024 at 10:39 AM

## 2024-09-03 NOTE — PROGRESS NOTES
Procedure:  Level of Consciousness: [x]Alert [x]Oriented []Disoriented []Lethargic  Anxiety Level: [x]Calm []Anxious []Depressed []Other  Skin: [x]Warm [x]Dry []Cool []Moist []Intact []Other  Cardiovascular: [x]Palpitations: [x]Never []Occasionally []Frequently  Chest Pain: [x]No []Yes  Respiratory:  [x]Unlabored []Labored []Cough ([] Productive []Unproductive)  HCG Required: [x]No []Yes Pt. Reports she is 2 years post menopause  Results: []Negative []Positive  Knowledge Level:        [x]Patient/Other verbalized understanding of pre-procedure instructions.        [x]Assessment of post-op care needs (transportation, responsible caregiver)        [x]Able to discuss health care problems and how to deal with it.  Factors that Affect Teaching:        Language Barrier: [x]No []Yes - why:        Hearing Loss:        [x]No []Yes            Corrective Device:  []Yes []No        Vision Loss:           []No [x]Yes            Corrective Device:  [x]Yes []No        Memory Loss:       [x]No []Yes            []Short Term []Long Term  Motivational Level:  [x]Asks Questions                  []Extremely Anxious       [x]Seems Interested               []Seems Uninterested                  []Denies need for Education  Risk for Injury:  [x]Patient oriented to person, place and time  []History of frequent falls/loss of balance  Nutritional:  []Change in appetite   []Weight Gain   []Weight Loss  Functional:  []Requires assistance with ADL's

## 2024-09-04 DIAGNOSIS — M62.830 SPASM OF MUSCLE OF LOWER BACK: ICD-10-CM

## 2024-09-04 DIAGNOSIS — G89.29 CHRONIC BILATERAL LOW BACK PAIN WITHOUT SCIATICA: Chronic | ICD-10-CM

## 2024-09-04 DIAGNOSIS — M79.18 MYOFASCIAL PAIN: Chronic | ICD-10-CM

## 2024-09-04 DIAGNOSIS — M62.838 CERVICAL PARASPINAL MUSCLE SPASM: ICD-10-CM

## 2024-09-04 DIAGNOSIS — M54.50 CHRONIC BILATERAL LOW BACK PAIN WITHOUT SCIATICA: Chronic | ICD-10-CM

## 2024-09-04 RX ORDER — HYDROCODONE BITARTRATE AND ACETAMINOPHEN 5; 325 MG/1; MG/1
1 TABLET ORAL EVERY 8 HOURS PRN
Qty: 90 TABLET | Refills: 0 | Status: SHIPPED | OUTPATIENT
Start: 2024-09-06 | End: 2024-10-06

## 2024-09-04 RX ORDER — ONDANSETRON 8 MG/1
8 TABLET, ORALLY DISINTEGRATING ORAL EVERY 8 HOURS PRN
Qty: 90 TABLET | Refills: 0 | Status: SHIPPED | OUTPATIENT
Start: 2024-09-04 | End: 2024-12-03

## 2024-09-09 ENCOUNTER — TELEPHONE (OUTPATIENT)
Dept: PAIN MANAGEMENT | Age: 49
End: 2024-09-09

## 2024-09-09 DIAGNOSIS — M79.18 MYOFASCIAL PAIN: Primary | Chronic | ICD-10-CM

## 2024-09-17 ENCOUNTER — HOSPITAL ENCOUNTER (OUTPATIENT)
Dept: PAIN MANAGEMENT | Age: 49
Discharge: HOME | End: 2024-09-17
Payer: MEDICARE

## 2024-09-17 VITALS
HEART RATE: 83 BPM | SYSTOLIC BLOOD PRESSURE: 127 MMHG | DIASTOLIC BLOOD PRESSURE: 73 MMHG | OXYGEN SATURATION: 98 % | RESPIRATION RATE: 18 BRPM | TEMPERATURE: 96 F

## 2024-09-17 DIAGNOSIS — R52 PAIN MANAGEMENT: ICD-10-CM

## 2024-09-17 PROCEDURE — 2580000003 HC RX 258

## 2024-09-17 PROCEDURE — G0260 INJ FOR SACROILIAC JT ANESTH: HCPCS

## 2024-09-17 PROCEDURE — 2500000003 HC RX 250 WO HCPCS

## 2024-09-17 PROCEDURE — 6360000002 HC RX W HCPCS

## 2024-09-17 RX ORDER — BUPIVACAINE HYDROCHLORIDE 5 MG/ML
2 INJECTION, SOLUTION EPIDURAL; INTRACAUDAL ONCE
Status: DISCONTINUED | OUTPATIENT
Start: 2024-09-17 | End: 2024-09-19 | Stop reason: HOSPADM

## 2024-09-17 RX ORDER — SODIUM CHLORIDE 9 MG/ML
3 INJECTION, SOLUTION INTRAMUSCULAR; INTRAVENOUS; SUBCUTANEOUS ONCE
Status: DISCONTINUED | OUTPATIENT
Start: 2024-09-17 | End: 2024-09-19 | Stop reason: HOSPADM

## 2024-09-17 RX ORDER — METHYLPREDNISOLONE ACETATE 80 MG/ML
80 INJECTION, SUSPENSION INTRA-ARTICULAR; INTRALESIONAL; INTRAMUSCULAR; SOFT TISSUE ONCE
Status: DISCONTINUED | OUTPATIENT
Start: 2024-09-17 | End: 2024-09-19 | Stop reason: HOSPADM

## 2024-09-17 RX ORDER — LIDOCAINE HYDROCHLORIDE 10 MG/ML
7 INJECTION, SOLUTION EPIDURAL; INFILTRATION; INTRACAUDAL; PERINEURAL ONCE
Status: DISCONTINUED | OUTPATIENT
Start: 2024-09-17 | End: 2024-09-19 | Stop reason: HOSPADM

## 2024-09-17 ASSESSMENT — PAIN DESCRIPTION - PAIN TYPE: TYPE: CHRONIC PAIN

## 2024-09-17 ASSESSMENT — PAIN SCALES - GENERAL: PAINLEVEL_OUTOF10: 6

## 2024-09-17 ASSESSMENT — PAIN DESCRIPTION - DIRECTION: RADIATING_TOWARDS: DOWN RIGHT LEG

## 2024-09-17 ASSESSMENT — PAIN - FUNCTIONAL ASSESSMENT: PAIN_FUNCTIONAL_ASSESSMENT: PREVENTS OR INTERFERES SOME ACTIVE ACTIVITIES AND ADLS

## 2024-09-17 ASSESSMENT — PAIN DESCRIPTION - LOCATION: LOCATION: BACK

## 2024-09-17 ASSESSMENT — PAIN DESCRIPTION - DESCRIPTORS: DESCRIPTORS: ACHING;BURNING

## 2024-09-17 ASSESSMENT — PAIN DESCRIPTION - FREQUENCY: FREQUENCY: INTERMITTENT

## 2024-09-17 ASSESSMENT — PAIN DESCRIPTION - ORIENTATION: ORIENTATION: RIGHT

## 2024-10-03 ENCOUNTER — HOSPITAL ENCOUNTER (OUTPATIENT)
Dept: PAIN MANAGEMENT | Age: 49
Discharge: HOME | End: 2024-10-03
Payer: MEDICARE

## 2024-10-03 VITALS
SYSTOLIC BLOOD PRESSURE: 139 MMHG | RESPIRATION RATE: 16 BRPM | HEART RATE: 95 BPM | TEMPERATURE: 97 F | DIASTOLIC BLOOD PRESSURE: 85 MMHG | OXYGEN SATURATION: 98 %

## 2024-10-03 DIAGNOSIS — G43.019 INTRACTABLE MIGRAINE WITHOUT AURA AND WITHOUT STATUS MIGRAINOSUS: Primary | Chronic | ICD-10-CM

## 2024-10-03 PROCEDURE — 2580000003 HC RX 258

## 2024-10-03 PROCEDURE — 64615 CHEMODENERV MUSC MIGRAINE: CPT

## 2024-10-03 PROCEDURE — 6360000002 HC RX W HCPCS

## 2024-10-03 RX ORDER — SODIUM CHLORIDE 9 MG/ML
4.5 INJECTION, SOLUTION INTRAMUSCULAR; INTRAVENOUS; SUBCUTANEOUS ONCE
Status: DISCONTINUED | OUTPATIENT
Start: 2024-10-03 | End: 2024-10-05 | Stop reason: HOSPADM

## 2024-10-03 NOTE — DISCHARGE INSTRUCTIONS
[facility]  Post Procedure Discharge Instructions        YOU HAVE HAD THE FOLLOWING PROCEDURE:                                  [] Occipital Nerve Blocks  [] CTS wrist injection(s)  [] Knee Injection(s)         [] Shoulder Injection(s)   [] Elbow Injection(s)     [x] Botox Injection  [] Cervical Trigger Point Injections    [] Thoracic Trigger Point Injections    [] Lumbar Trigger Point Injections  [] Piriformis Trigger Point Injections  [] SI Joint Injection(s)     [] Trochanteric Bursa Injection(s)       [] Ankle Injection(s)   [] Plantar Fasciitis   []  ______________  Injection(s) [x] Botox [x]  Migraines [] Spasticity    YOU HAVE RECEIVED THE FOLLOWING MEDICATIONS IN YOUR INJECTION(s)  [] Lidocaine [] Bupivacaine   [] DepoMedrol (steroid) [] Decadron (steroid)  []  Kenalog (steroid)   [] Toradol  [] Supartz [] Zilretta    [x] Botox        PATIENT INFORMATION:   You may experience the following symptoms after your procedure. These symptoms are normal and should not cause concern:    You may have an increase in your pain. This may last 24 - 48 hours after your procedure.  You may have no change in the pain that you had prior to your injection(s).  You may have weakness or numbness in your affected extremity. If this occurs, this may last until numbing the medication wears off.     REPORT THE FOLLOWING SYMPTOMS TO YOUR DOCTOR:  Redness, swelling or drainage at the injection site(s)  Unusual pain that interferes with your normal activities of daily living.    OTHER INSTRUCTIONS:    [x] I will apply ice to the injection site(s) for at least 24 hours after the procedure. I will rotate the ice on for 20 minutes and off for 20 minutes for at least 24 hours.    [x] I will not apply heat for at least 48 hours and I will not take a hot bath or shower for at least 24 hours.     [] I understand that if Lidocaine or Bupivacaine was used in my injection(s) that the injection site(s)

## 2024-10-03 NOTE — PROGRESS NOTES
[facility]   [address]  [place] [address]  [phone] p  [phone]    Procedure:  Level of Consciousness: [x]Alert [x]Oriented []Disoriented []Lethargic  Anxiety Level: [x]Calm []Anxious []Depressed []Other  Skin: [x]Warm [x]Dry []Cool []Moist []Intact []Other  Cardiovascular: []Palpitations: [x]Never []Occasionally []Frequently  Chest Pain: [x]No []Yes  Respiratory:  [x]Unlabored []Labored []Cough ([] Productive []Unproductive)  HCG Required: [x]No []Yes   Results: []Negative []Positive  Knowledge Level:        [x]Patient/Other verbalized understanding of pre-procedure instructions.        [x]Assessment of post-op care needs (transportation, responsible caregiver)        [x]Able to discuss health care problems and how to deal with it.  Factors that Affect Teaching:        Language Barrier: [x]No []Yes - why:        Hearing Loss:        [x]No []Yes            Corrective Device:  []Yes []No        Vision Loss:           []No []Yes            Corrective Device:  [x]Yes []No        Memory Loss:       [x]No []Yes            []Short Term []Long Term  Motivational Level:  [x]Asks Questions                  []Extremely Anxious       [x]Seems Interested               []Seems Uninterested                  [x]Denies need for Education  Risk for Injury:  [x]Patient oriented to person, place and time  []History of frequent falls/loss of balance  Nutritional:  []Change in appetite   []Weight Gain   []Weight Loss  Functional:  []Requires assistance with ADL's      Migraine  Follow up Assessment:  Patient experiences 9 headaches per month  Patient states that he/she has  8 headaches out of 30 days each month.  Patient states that he/she has 1 migraines out of 30 days each month.  Patient has experienced a  reduction in Migraine headaches less than 15 days per month [x]Yes []No  Patient has experienced a reduction in Migraine hours [x]Yes []No

## 2024-10-03 NOTE — PROCEDURES
PROCEDURE NOTE  Date: 10/3/2024   Name: Yesenia Ernandez  YOB: 1975    Procedures  [facility]    Patient Name: Yesenia Ernandez    : 1975                    Age: 49 y.o.    Sex: female    Date: October 3, 2024    Pre-op Diagnosis: Chronic Migraines    Post-op Diagnosis: Chronic Migraines    Procedure: Botox injections x 155 units (45 units wasted)    Performing Procedure:  Julienne Arriola APRN - CNP    Patient Vitals for the past 24 hrs:   BP Temp Temp src Pulse Resp SpO2   10/03/24 1244 139/85 97 °F (36.1 °C) Temporal 95 16 98 %       Previous Injections:  Patient had 15/8 headaches/migraines over the past month.     Indications:    Yesenia Ernandez has been treated for problems with chronic migraine headaches. The patient was taken through a conservative course of treatment without complete resolution of symptoms. Botox injection therapy was offered and after the risks and benefits of the procedure were explained to the patient, we had agreed to proceed.    The patient was taken to the procedure room and placed in the seated position. The following muscles were identified using palpation and standard landmarks. These muscles were marked and prepped with alcohol. A total of 155 units were injected after careful aspiration and the following distribution bilaterally:  10 units in 2 sites, procerus 5 units in one site, frontalis 20 units in 4 sites, temporalis 40 unit in 8 sites, occipitals 30 units in 6 sites, cervical paraspinals 20 units in 4 sites, and trapezius 30 units in 6 sites.    Discharge:  The patient tolerated the procedure well. There were no complications during the procedure and the patient was discharged home with discharge instructions.    The patient has been instructed to contact the office should there be any complications or questions to arise between today and their next appointment.    Plan:  [] Will return to the office in

## 2024-10-08 DIAGNOSIS — G89.29 CHRONIC BILATERAL LOW BACK PAIN WITHOUT SCIATICA: Chronic | ICD-10-CM

## 2024-10-08 DIAGNOSIS — M54.50 CHRONIC BILATERAL LOW BACK PAIN WITHOUT SCIATICA: Chronic | ICD-10-CM

## 2024-10-08 RX ORDER — HYDROCODONE BITARTRATE AND ACETAMINOPHEN 5; 325 MG/1; MG/1
1 TABLET ORAL EVERY 8 HOURS PRN
Qty: 90 TABLET | Refills: 0 | Status: SHIPPED | OUTPATIENT
Start: 2024-10-08 | End: 2024-11-07

## 2024-10-29 ENCOUNTER — HOSPITAL ENCOUNTER (OUTPATIENT)
Dept: PAIN MANAGEMENT | Age: 49
Discharge: HOME | End: 2024-10-29
Payer: MEDICARE

## 2024-10-29 VITALS
DIASTOLIC BLOOD PRESSURE: 80 MMHG | HEART RATE: 80 BPM | SYSTOLIC BLOOD PRESSURE: 135 MMHG | OXYGEN SATURATION: 100 % | TEMPERATURE: 97.3 F | RESPIRATION RATE: 18 BRPM

## 2024-10-29 PROCEDURE — 64616 CHEMODENERV MUSC NECK DYSTON: CPT

## 2024-10-29 PROCEDURE — 6360000002 HC RX W HCPCS

## 2024-10-29 PROCEDURE — 2580000003 HC RX 258

## 2024-10-29 RX ORDER — SODIUM CHLORIDE 9 MG/ML
4.5 INJECTION, SOLUTION INTRAMUSCULAR; INTRAVENOUS; SUBCUTANEOUS ONCE
Status: DISCONTINUED | OUTPATIENT
Start: 2024-10-29 | End: 2024-10-31 | Stop reason: HOSPADM

## 2024-10-29 NOTE — PROGRESS NOTES
Patient states that he/she has ___12___ headaches out of 30 days each month.  Patient states that he/she has ___2___ migraines out of 30 days each month.monthly

## 2024-10-29 NOTE — INTERVAL H&P NOTE
Update History & Physical    The patient's History and Physical was reviewed with the patient and I examined the patient. There was no change. The surgical site was confirmed by the patient and me.     Plan: The risks, benefits, expected outcome, and alternative to the recommended procedure have been discussed with the patient. Patient understands and wants to proceed with the procedure.     Electronically signed by Da Briones MD on 10/29/2024 at 8:05 AM

## 2024-11-05 DIAGNOSIS — G43.019 INTRACTABLE MIGRAINE WITHOUT AURA AND WITHOUT STATUS MIGRAINOSUS: Chronic | ICD-10-CM

## 2024-11-05 DIAGNOSIS — G89.29 CHRONIC BILATERAL LOW BACK PAIN WITHOUT SCIATICA: Chronic | ICD-10-CM

## 2024-11-05 DIAGNOSIS — M54.50 CHRONIC BILATERAL LOW BACK PAIN WITHOUT SCIATICA: Chronic | ICD-10-CM

## 2024-11-05 RX ORDER — HYDROCODONE BITARTRATE AND ACETAMINOPHEN 5; 325 MG/1; MG/1
1 TABLET ORAL EVERY 8 HOURS PRN
Qty: 90 TABLET | Refills: 0 | Status: SHIPPED | OUTPATIENT
Start: 2024-11-07 | End: 2024-12-07

## 2024-11-05 RX ORDER — UBROGEPANT 100 MG/1
1 TABLET ORAL DAILY PRN
Qty: 16 TABLET | Refills: 5 | Status: SHIPPED | OUTPATIENT
Start: 2024-11-05

## 2024-12-26 ENCOUNTER — TELEPHONE (OUTPATIENT)
Dept: PAIN MANAGEMENT | Age: 49
End: 2024-12-26

## 2024-12-26 NOTE — TELEPHONE ENCOUNTER
PA renewal for Lidocaine patches: Your PA has been resolved, no additional PA is required on current insurance plan.

## 2025-01-02 ENCOUNTER — TELEPHONE (OUTPATIENT)
Dept: PAIN MANAGEMENT | Age: 50
End: 2025-01-02

## 2025-01-09 ENCOUNTER — HOSPITAL ENCOUNTER (OUTPATIENT)
Dept: PAIN MANAGEMENT | Age: 50
Discharge: HOME | End: 2025-01-09
Payer: MEDICARE

## 2025-01-09 VITALS
OXYGEN SATURATION: 96 % | TEMPERATURE: 98.3 F | HEART RATE: 95 BPM | SYSTOLIC BLOOD PRESSURE: 123 MMHG | DIASTOLIC BLOOD PRESSURE: 67 MMHG | RESPIRATION RATE: 20 BRPM

## 2025-01-09 DIAGNOSIS — G89.29 CHRONIC BILATERAL LOW BACK PAIN WITH BILATERAL SCIATICA: ICD-10-CM

## 2025-01-09 DIAGNOSIS — G89.29 CHRONIC PAIN OF BOTH KNEES: ICD-10-CM

## 2025-01-09 DIAGNOSIS — M79.18 MYOFASCIAL PAIN: Chronic | ICD-10-CM

## 2025-01-09 DIAGNOSIS — M54.41 CHRONIC BILATERAL LOW BACK PAIN WITH BILATERAL SCIATICA: ICD-10-CM

## 2025-01-09 DIAGNOSIS — G43.019 INTRACTABLE MIGRAINE WITHOUT AURA AND WITHOUT STATUS MIGRAINOSUS: Primary | Chronic | ICD-10-CM

## 2025-01-09 DIAGNOSIS — M25.562 CHRONIC PAIN OF BOTH KNEES: ICD-10-CM

## 2025-01-09 DIAGNOSIS — M54.42 CHRONIC BILATERAL LOW BACK PAIN WITH BILATERAL SCIATICA: ICD-10-CM

## 2025-01-09 DIAGNOSIS — M25.561 CHRONIC PAIN OF BOTH KNEES: ICD-10-CM

## 2025-01-09 PROCEDURE — 64615 CHEMODENERV MUSC MIGRAINE: CPT

## 2025-01-09 PROCEDURE — 6360000002 HC RX W HCPCS

## 2025-01-09 PROCEDURE — 2580000003 HC RX 258

## 2025-01-09 RX ORDER — IBUPROFEN 800 MG/1
800 TABLET, FILM COATED ORAL 2 TIMES DAILY PRN
Qty: 60 TABLET | Refills: 2 | Status: SHIPPED | OUTPATIENT
Start: 2025-01-09

## 2025-01-09 RX ORDER — UBROGEPANT 100 MG/1
1 TABLET ORAL DAILY PRN
Qty: 16 TABLET | Refills: 2 | Status: SHIPPED | OUTPATIENT
Start: 2025-01-09

## 2025-01-09 RX ORDER — SODIUM CHLORIDE 9 MG/ML
4.5 INJECTION, SOLUTION INTRAMUSCULAR; INTRAVENOUS; SUBCUTANEOUS ONCE
Status: DISCONTINUED | OUTPATIENT
Start: 2025-01-09 | End: 2025-01-11 | Stop reason: HOSPADM

## 2025-01-09 NOTE — PROCEDURES
PROCEDURE NOTE  Date: 2025   Name: Yesenia Ernandez  YOB: 1975    Procedures  [facility]    Patient Name: Yesenia Ernandez    : 1975                    Age: 49 y.o.    Sex: female    Date: 2025    Pre-op Diagnosis: Chronic Migraines    Post-op Diagnosis: Chronic Migraines    Procedure: Botox injections x 155 units (45 units wasted)    Performing Procedure:  Julienne Arriola APRN - CNP    Patient Vitals for the past 24 hrs:   BP Temp Temp src Pulse Resp SpO2   25 1010 123/67 98.3 °F (36.8 °C) Temporal 95 20 96 %       Previous Injections:  Patient had 10/4 headaches/migraines over the past month.     Indications:    Yesenia Ernandez has been treated for problems with chronic migraine headaches. The patient was taken through a conservative course of treatment without complete resolution of symptoms. Botox injection therapy was offered and after the risks and benefits of the procedure were explained to the patient, we had agreed to proceed.    The patient was taken to the procedure room and placed in the seated position. The following muscles were identified using palpation and standard landmarks. These muscles were marked and prepped with alcohol. A total of 155 units were injected after careful aspiration and the following distribution bilaterally:  10 units in 2 sites, procerus 5 units in one site, frontalis 20 units in 4 sites, temporalis 40 unit in 8 sites, occipitals 30 units in 6 sites, cervical paraspinals 20 units in 4 sites, and trapezius 30 units in 6 sites.    Discharge:  The patient tolerated the procedure well. There were no complications during the procedure and the patient was discharged home with discharge instructions.    The patient has been instructed to contact the office should there be any complications or questions to arise between today and their next appointment.    Plan:  [x] Will return to the office in

## 2025-01-09 NOTE — PROGRESS NOTES
[facility]   [address]  [place] [address]  [phone] p  [phone]    Procedure:  Level of Consciousness: [x]Alert [x]Oriented []Disoriented []Lethargic  Anxiety Level: [x]Calm []Anxious []Depressed []Other  Skin: [x]Warm [x]Dry []Cool []Moist []Intact []Other  Cardiovascular: []Palpitations: [x]Never []Occasionally []Frequently  Chest Pain: [x]No []Yes  Respiratory:  [x]Unlabored []Labored []Cough ([] Productive []Unproductive)  HCG Required: [x]No []Yes   Results: []Negative []Positive  Knowledge Level:        [x]Patient/Other verbalized understanding of pre-procedure instructions.        [x]Assessment of post-op care needs (transportation, responsible caregiver)        [x]Able to discuss health care problems and how to deal with it.  Factors that Affect Teaching:        Language Barrier: [x]No []Yes - why:        Hearing Loss:        [x]No []Yes            Corrective Device:  []Yes []No        Vision Loss:           []No [x]Yes            Corrective Device:  [x]Yes []No        Memory Loss:       [x]No []Yes            []Short Term []Long Term  Motivational Level:  []Asks Questions                  []Extremely Anxious       []Seems Interested               []Seems Uninterested                  [x]Denies need for Education  Risk for Injury:  [x]Patient oriented to person, place and time  []History of frequent falls/loss of balance  Nutritional:  [x]Change in appetite   []Weight Gain   []Weight Loss  Functional:  [x]Requires assistance with ADL's      Migraine  Follow up Assessment:  Patient experiences *17* headaches per month  Patient states that he/she has  15** headaches out of 30 days each month.  Patient states that he/she has *2* migraines out of 30 days each month.  Patient has experienced a  reduction in Migraine headaches less than 15 days per month []Yes [x]No  Patient has experienced a reduction in Migraine hours [x]Yes []No

## 2025-01-20 ENCOUNTER — HOSPITAL ENCOUNTER (OUTPATIENT)
Dept: PAIN MANAGEMENT | Age: 50
Discharge: HOME | End: 2025-01-20
Payer: MEDICARE

## 2025-01-20 VITALS
RESPIRATION RATE: 16 BRPM | SYSTOLIC BLOOD PRESSURE: 123 MMHG | TEMPERATURE: 98.3 F | DIASTOLIC BLOOD PRESSURE: 79 MMHG | HEART RATE: 90 BPM

## 2025-01-20 DIAGNOSIS — M54.42 CHRONIC BILATERAL LOW BACK PAIN WITH BILATERAL SCIATICA: ICD-10-CM

## 2025-01-20 DIAGNOSIS — G89.29 CHRONIC PAIN OF BOTH KNEES: ICD-10-CM

## 2025-01-20 DIAGNOSIS — F33.0 MILD EPISODE OF RECURRENT MAJOR DEPRESSIVE DISORDER (HCC): ICD-10-CM

## 2025-01-20 DIAGNOSIS — G89.29 CHRONIC BILATERAL LOW BACK PAIN WITH BILATERAL SCIATICA: ICD-10-CM

## 2025-01-20 DIAGNOSIS — M62.838 MUSCLE SPASTICITY: ICD-10-CM

## 2025-01-20 DIAGNOSIS — M54.41 CHRONIC BILATERAL LOW BACK PAIN WITH BILATERAL SCIATICA: ICD-10-CM

## 2025-01-20 DIAGNOSIS — M25.561 CHRONIC PAIN OF BOTH KNEES: Primary | ICD-10-CM

## 2025-01-20 DIAGNOSIS — G43.019 INTRACTABLE MIGRAINE WITHOUT AURA AND WITHOUT STATUS MIGRAINOSUS: Primary | Chronic | ICD-10-CM

## 2025-01-20 DIAGNOSIS — Z51.81 ENCOUNTER FOR MONITORING OPIOID MAINTENANCE THERAPY: ICD-10-CM

## 2025-01-20 DIAGNOSIS — M25.562 CHRONIC PAIN OF BOTH KNEES: ICD-10-CM

## 2025-01-20 DIAGNOSIS — Z79.891 ENCOUNTER FOR MONITORING OPIOID MAINTENANCE THERAPY: ICD-10-CM

## 2025-01-20 DIAGNOSIS — M25.562 CHRONIC PAIN OF BOTH KNEES: Primary | ICD-10-CM

## 2025-01-20 DIAGNOSIS — G47.33 OBSTRUCTIVE SLEEP APNEA: ICD-10-CM

## 2025-01-20 DIAGNOSIS — M79.18 MYOFASCIAL PAIN: Chronic | ICD-10-CM

## 2025-01-20 DIAGNOSIS — M53.3 SI (SACROILIAC) JOINT DYSFUNCTION: Chronic | ICD-10-CM

## 2025-01-20 DIAGNOSIS — G43.019 INTRACTABLE MIGRAINE WITHOUT AURA AND WITHOUT STATUS MIGRAINOSUS: Chronic | ICD-10-CM

## 2025-01-20 DIAGNOSIS — M25.561 CHRONIC PAIN OF BOTH KNEES: ICD-10-CM

## 2025-01-20 DIAGNOSIS — G89.29 CHRONIC PAIN OF BOTH KNEES: Primary | ICD-10-CM

## 2025-01-20 DIAGNOSIS — G93.5 CHIARI MALFORMATION TYPE I (HCC): Chronic | ICD-10-CM

## 2025-01-20 PROCEDURE — 99214 OFFICE O/P EST MOD 30 MIN: CPT

## 2025-01-20 RX ORDER — BUPRENORPHINE 5 UG/H
1 PATCH TRANSDERMAL WEEKLY
Qty: 4 PATCH | Refills: 0 | Status: SHIPPED | OUTPATIENT
Start: 2025-01-20 | End: 2025-02-17

## 2025-01-20 RX ORDER — LISDEXAMFETAMINE DIMESYLATE 50 MG/1
50 CAPSULE ORAL EVERY MORNING
COMMUNITY
Start: 2024-12-27

## 2025-01-20 RX ORDER — TRAZODONE HYDROCHLORIDE 50 MG/1
TABLET, FILM COATED ORAL
COMMUNITY
Start: 2024-11-18

## 2025-01-20 ASSESSMENT — PAIN DESCRIPTION - LOCATION
LOCATION: BACK
LOCATION_3: HEAD
LOCATION_2: NECK

## 2025-01-20 ASSESSMENT — ENCOUNTER SYMPTOMS
RESPIRATORY NEGATIVE: 1
BACK PAIN: 1
BOWEL INCONTINENCE: 0
GASTROINTESTINAL NEGATIVE: 1
ABDOMINAL PAIN: 0
SWOLLEN GLANDS: 0
VOMITING: 0
EYES NEGATIVE: 1
SORE THROAT: 0

## 2025-01-20 ASSESSMENT — PAIN DESCRIPTION - DESCRIPTORS: DESCRIPTORS: ACHING;BURNING

## 2025-01-20 ASSESSMENT — PAIN DESCRIPTION - PAIN TYPE: TYPE: CHRONIC PAIN

## 2025-01-20 ASSESSMENT — PAIN DESCRIPTION - ORIENTATION
ORIENTATION: LOWER
ORIENTATION_3: INNER
ORIENTATION_2: MID;LOWER

## 2025-01-20 ASSESSMENT — PAIN SCALES - GENERAL: PAINLEVEL_OUTOF10: 6

## 2025-01-20 ASSESSMENT — PAIN DESCRIPTION - INTENSITY
RATING_3: 3
RATING_2: 6

## 2025-01-20 NOTE — TELEPHONE ENCOUNTER
1. Intractable migraine without aura and without status migrainosus  - Atogepant 60 MG TABS; Take 60 mg by mouth daily  Dispense: 30 tablet; Refill: 2    2. Chronic pain of both knees  - buprenorphine (BUTRANS) 5 MCG/HR PTWK; Place 1 patch onto the skin once a week for 28 days. Max Daily Amount: 1 patch  Dispense: 4 patch; Refill: 0    3. Chronic bilateral low back pain with bilateral sciatica  - buprenorphine (BUTRANS) 5 MCG/HR PTWK; Place 1 patch onto the skin once a week for 28 days. Max Daily Amount: 1 patch  Dispense: 4 patch; Refill: 0      Requested Prescriptions     Signed Prescriptions Disp Refills    buprenorphine (BUTRANS) 5 MCG/HR PTWK 4 patch 0     Sig: Place 1 patch onto the skin once a week for 28 days. Max Daily Amount: 1 patch     Authorizing Provider: BLANE KRUSE    Atogepant 60 MG TABS 30 tablet 2     Sig: Take 60 mg by mouth daily     Authorizing Provider: BLANE KRUSE       Continue medication with refill sent at appointment yes; refill sent to medical director at appointment no, see refill encounter dated 1/20/2025    Electronically signed by AMNA Levy CNP on 1/20/2025 at 3:08 PM

## 2025-01-20 NOTE — PROGRESS NOTES
Clinic Documentation      Education Provided:  [x] Review of Julian  [] Agreement Review  [x] PEG Score Calculated [] PHQ Score Calculated [] ORT Score Calculated    [] Compliance Issues Discussed [] Cognitive Behavior Needs [x] Exercise [] Review of Test [] Financial Issues  [x] Tobacco/Alcohol Use Reviewed [x] Teaching [] New Patient [] Picture Obtained    Physician Plan:  [] Outgoing Referral  [] Pharmacy Consult  [x] Test Ordered [x] Prescription Ordered/Changed   [] Obtained Test Results / Consult Notes        Complete if patient is withholding blood thinner for procedure     Blood Thinner Patient is currently taking:      [] Plavix (Hold for 7 days)  [] Aspirin (Hold for 5 days)     [] Pletal (Hold for 2 days)  [] Pradaxa (Hold for 3 days)    [] Effient (Hold for 7 days)  [] Xarelto (Hold for 2 days)    [] Eliquis (Hold for 2 days)  [] Brilinta (Hold for 7 days)    [] Coumadin (Hold for 5 days) - (INR needs to be drawn the day prior to procedure- INR < 2.0)    [] Aggrenox (Hold for 7 days)        [] Patient will stop medication on their own.    [] Blood Thinner Form Faxed for approval to hold.   Provider form faxed to:     Assessment Completed by:  Electronically signed by Emily Richardson MA on 1/20/2025 at 3:37 PM  
strengthening. Patient was encouraged stretching exercise at least twice daily with a focus on torso (core) strengthening. Patient to stretch and perform exercises before bed and upon wakening. Patient also to perform targeted exercise program provided as a handout 3 times weekly.     Goal:  Pain Management Goals of Therapy:   [] Resolution in pain  [x] Decrease in pain level  [x] Improvement in ADL's  [x] Increase in activities with less pain  [] Decrease in medication     [] No Controlled Meds Prescribed    [x] Controlled substance monitoring:    [x] Discussed medication side effects, risk of tolerance and/or dependence, and/or alternative treatment  [] Discussed the detrimental effects of long term narcotic use in younger patients especially women of childbearing years.  [x] No signs and symptoms of potential drug abuse or diversion were identified  [] Signs of potential drug abuse or diversion were identified   [] ORT Score   [] UDS non-compliant   [] See Note  [x] Random urine drug screen sent today to maintain compliance with medication monitoring.   [] Random urine drug screen not completed today   [] Deferred New Patient  [x] Compliant (saliva)3/26/24   [] Not Compliant see note  [] Medication agreement with provider signed today. Contract reviewed with patient, who verbalized understanding.   [x] Medication agreement with provider on file under media   [] Medication regimen effective with no c/o side effects and continued  []  New patient continuing current medication while developing POC  [x]  On going assessment and evaluation of medication regimen  [x]  Medication regimen not effective see plan for changes  [x] Julian reviewed & on file under media   [x] Home exercise program consisting of twice daily stretches performed upon awakening and before bedtime every day. Secondly, three times weekly exercises targeted at areas of greatest pain to be performed per handout instructions.     •[facility] at the

## 2025-01-21 ENCOUNTER — TELEPHONE (OUTPATIENT)
Dept: PAIN MANAGEMENT | Age: 50
End: 2025-01-21

## 2025-01-28 ENCOUNTER — HOSPITAL ENCOUNTER (OUTPATIENT)
Dept: PAIN MANAGEMENT | Age: 50
Discharge: HOME | End: 2025-01-28
Payer: MEDICAID

## 2025-01-28 VITALS
TEMPERATURE: 98 F | SYSTOLIC BLOOD PRESSURE: 128 MMHG | DIASTOLIC BLOOD PRESSURE: 68 MMHG | RESPIRATION RATE: 16 BRPM | HEART RATE: 90 BPM | OXYGEN SATURATION: 94 %

## 2025-01-28 PROCEDURE — 64616 CHEMODENERV MUSC NECK DYSTON: CPT

## 2025-01-28 PROCEDURE — 64646 CHEMODENERV TRUNK MUSC 1-5: CPT

## 2025-01-28 PROCEDURE — 6360000002 HC RX W HCPCS

## 2025-01-28 PROCEDURE — 2580000003 HC RX 258

## 2025-01-28 RX ORDER — SODIUM CHLORIDE 9 MG/ML
4.5 INJECTION, SOLUTION INTRAMUSCULAR; INTRAVENOUS; SUBCUTANEOUS ONCE
Status: DISCONTINUED | OUTPATIENT
Start: 2025-01-28 | End: 2025-01-30 | Stop reason: HOSPADM

## 2025-01-28 NOTE — PROGRESS NOTES
Procedure:  Level of Consciousness: [x]Alert [x]Oriented []Disoriented []Lethargic  Anxiety Level: [x]Calm []Anxious []Depressed []Other  Skin: [x]Warm [x]Dry []Cool []Moist []Intact []Other  Cardiovascular: [x]Palpitations: [x]Never []Occasionally []Frequently  Chest Pain: [x]No []Yes  Respiratory:  [x]Unlabored []Labored []Cough ([] Productive []Unproductive)  HCG Required: [x]No []Yes   Results: []Negative []Positive  Knowledge Level:        [x]Patient/Other verbalized understanding of pre-procedure instructions.        [x]Assessment of post-op care needs (transportation, responsible caregiver)        [x]Able to discuss health care problems and how to deal with it.  Factors that Affect Teaching:        Language Barrier: [x]No []Yes - why:        Hearing Loss:        [x]No []Yes            Corrective Device:  []Ye[]s No        Vision Loss:           []No [x]Yes            Corrective Device:  [x]Yes []No        Memory Loss:       [x]No []Yes            []Short Term []Long Term  Motivational Level:  [x]Asks Questions                  []Extremely Anxious       [x]Seems Interested               []Seems Uninterested                  []Denies need for Education  Risk for Injury:  [x]Patient oriented to person, place and time  []History of frequent falls/loss of balance  Nutritional:  []Change in appetite   []Weight Gain   []Weight Loss  Functional:  []Requires assistance with ADL's

## 2025-01-28 NOTE — PROGRESS NOTES
Patient states that he/she has __12____ headaches out of 30 days each month.  Patient states that he/she has ____1__ migraines out of 30 days each month.monthly

## 2025-01-28 NOTE — INTERVAL H&P NOTE
Update History & Physical    The patient's History and Physical  was reviewed with the patient and I examined the patient. There was no change. The surgical site was confirmed by the patient and me.     Plan: The risks, benefits, expected outcome, and alternative to the recommended procedure have been discussed with the patient. Patient understands and wants to proceed with the procedure.     Electronically signed by Da Briones MD on 1/28/2025 at 12:43 PM

## 2025-02-04 ENCOUNTER — TELEPHONE (OUTPATIENT)
Dept: PAIN MANAGEMENT | Age: 50
End: 2025-02-04

## 2025-02-04 NOTE — TELEPHONE ENCOUNTER
Patient left voicemail at 1043 this am, she was placed on new medication Butrans patches & was to call back in 2 weeks to let us know how she was doing. I tried calling 3 times no phone service.

## 2025-02-06 ENCOUNTER — HOSPITAL ENCOUNTER (OUTPATIENT)
Dept: PAIN MANAGEMENT | Age: 50
Discharge: HOME | End: 2025-02-06
Payer: MEDICARE

## 2025-02-06 VITALS
OXYGEN SATURATION: 100 % | HEART RATE: 101 BPM | DIASTOLIC BLOOD PRESSURE: 83 MMHG | RESPIRATION RATE: 16 BRPM | SYSTOLIC BLOOD PRESSURE: 138 MMHG | TEMPERATURE: 97.5 F

## 2025-02-06 DIAGNOSIS — G89.4 CHRONIC PAIN SYNDROME: ICD-10-CM

## 2025-02-06 DIAGNOSIS — M25.561 CHRONIC PAIN OF BOTH KNEES: Primary | ICD-10-CM

## 2025-02-06 DIAGNOSIS — M25.562 CHRONIC PAIN OF BOTH KNEES: Primary | ICD-10-CM

## 2025-02-06 DIAGNOSIS — G89.29 CHRONIC PAIN OF BOTH KNEES: Primary | ICD-10-CM

## 2025-02-06 PROCEDURE — 20611 DRAIN/INJ JOINT/BURSA W/US: CPT

## 2025-02-06 PROCEDURE — 6360000002 HC RX W HCPCS

## 2025-02-06 RX ORDER — LIDOCAINE HYDROCHLORIDE 10 MG/ML
2 INJECTION, SOLUTION EPIDURAL; INFILTRATION; INTRACAUDAL; PERINEURAL ONCE
Status: DISCONTINUED | OUTPATIENT
Start: 2025-02-06 | End: 2025-02-08 | Stop reason: HOSPADM

## 2025-02-06 RX ORDER — ETHYL CHLORIDE 100 %
AEROSOL, SPRAY (ML) TOPICAL PRN
Status: DISCONTINUED | OUTPATIENT
Start: 2025-02-06 | End: 2025-02-08 | Stop reason: HOSPADM

## 2025-02-06 RX ORDER — TRIAMCINOLONE ACETONIDE 40 MG/ML
80 INJECTION, SUSPENSION INTRA-ARTICULAR; INTRAMUSCULAR ONCE
Status: DISCONTINUED | OUTPATIENT
Start: 2025-02-06 | End: 2025-02-08 | Stop reason: HOSPADM

## 2025-02-06 RX ORDER — BUPRENORPHINE 7.5 UG/H
1 PATCH TRANSDERMAL WEEKLY
Qty: 4 PATCH | Refills: 0 | Status: SHIPPED | OUTPATIENT
Start: 2025-02-06 | End: 2025-03-06

## 2025-02-06 RX ORDER — BUPIVACAINE HYDROCHLORIDE 5 MG/ML
2 INJECTION, SOLUTION EPIDURAL; INTRACAUDAL ONCE
Status: DISCONTINUED | OUTPATIENT
Start: 2025-02-06 | End: 2025-02-08 | Stop reason: HOSPADM

## 2025-02-06 NOTE — PROCEDURES
PROCEDURE NOTE  Date: 2025   Name: Yesenia Ernandez  YOB: 1975    Patient was changed from Hydrocodone to Butrans Patch 5mcg last visit, she has been on the patch for 3 weeks. Patient reports initally the patch seemed to be effective, but the past 2 weeks her pain has been worse. Will increase her patch 7.5mcg today and follow up.    Procedures    [facility]    Patient Name: Yesenia Ernandez    : 1975    Age: 49 y.o.    Sex: female    Date: 2025    Preop Diagnosis: Chronic pain of bilateral Knee(s)    Postop Diagnosis: Chronic pain of  bilateral  Knee(s)      Procedure: Injection of  bilateral  Knee(s) with US confirmation    Performing Procedure: Julienne WOO - CNP    Patient Vitals for the past 24 hrs:   BP Temp Temp src Pulse Resp SpO2   25 1311 138/83 97.5 °F (36.4 °C) Temporal (!) 101 16 100 %       Description of Procedure:    After a brief physical assessment and failure to improve with conservative measures the patient presented for an injection of the bilateral Knee(s). The indications, limitations and possible complications were discussed with the patient and the patient elected to proceed with the procedure.    bilateral Knee(s)    After voluntary, informed and signed consent obtained the patient was placed in a seated position. Appropriate time out was obtained per policy. The knee was palpated at the anterior aspect of the knee to locate the patellar tendon. The projected injection site is approximately 1 cm medially or laterally of the midpoint of the tendon. Usually, there is a depression. The area was marked. The ultrasound transducer was used to confirm the appropriate location. The skin prepped in an aseptic fashion with CHG swab and then sprayed with Gebauer's Solution. Under aseptic technique 22 gauge 1 1/2 inch needle was then introduced into the Knee(s). After a negative aspiration, a solution of 1 ml of 0.5%

## 2025-02-06 NOTE — DISCHARGE INSTRUCTIONS
[facility]  Post Procedure Discharge Instructions        YOU HAVE HAD THE FOLLOWING PROCEDURE:                                  [] Occipital Nerve Blocks  [] CTS wrist injection(s)  [x] Knee Injection(s)         [] Shoulder Injection(s)   [] Elbow Injection(s)     [] Botox Injection  [] Cervical Trigger Point Injections    [] Thoracic Trigger Point Injections    [] Lumbar Trigger Point Injections  [] Piriformis Trigger Point Injections  [] SI Joint Injection(s)     [] Trochanteric Bursa Injection(s)       [] Ankle Injection(s)   [] Plantar Fasciitis   []  ______________  Injection(s) [] Botox []  Migraines [] Spasticity    YOU HAVE RECEIVED THE FOLLOWING MEDICATIONS IN YOUR INJECTION(s)  [x] Lidocaine [x] Bupivacaine   [] DepoMedrol (steroid) [] Decadron (steroid)  [x]  Kenalog (steroid)   [] Toradol  [] Supartz [] Zilretta    [] Botox        PATIENT INFORMATION:   You may experience the following symptoms after your procedure. These symptoms are normal and should not cause concern:    You may have an increase in your pain. This may last 24 - 48 hours after your procedure.  You may have no change in the pain that you had prior to your injection(s).  You may have weakness or numbness in your affected extremity. If this occurs, this may last until numbing the medication wears off.     REPORT THE FOLLOWING SYMPTOMS TO YOUR DOCTOR:  Redness, swelling or drainage at the injection site(s)  Unusual pain that interferes with your normal activities of daily living.    OTHER INSTRUCTIONS:    [x] I will apply ice to the injection site(s) for at least 24 hours after the procedure. I will rotate the ice on for 20 minutes and off for 20 minutes for at least 24 hours.    [x] I will not apply heat for at least 48 hours and I will not take a hot bath or shower for at least 24 hours.     [x] I understand that if Lidocaine or Bupivacaine was used in my injection(s) that the injection site(s)

## 2025-02-06 NOTE — PROGRESS NOTES
Procedure:  Level of Consciousness: [x]Alert [x]Oriented []Disoriented []Lethargic  Anxiety Level: [x]Calm []Anxious []Depressed []Other  Skin: [x]Warm [x]Dry []Cool []Moist []Intact []Other  Cardiovascular: []Palpitations: [x]Never []Occasionally []Frequently  Chest Pain: [x]No []Yes  Respiratory:  [x]Unlabored []Labored []Cough ([] Productive []Unproductive)  HCG Required: [x]No []Yes   Results: []Negative []Positive  Knowledge Level:        [x]Patient/Other verbalized understanding of pre-procedure instructions.        [x]Assessment of post-op care needs (transportation, responsible caregiver)        [x]Able to discuss health care problems and how to deal with it.  Factors that Affect Teaching:        Language Barrier: [x]No []Yes - why:        Hearing Loss:        [x]No []Yes            Corrective Device:  []Yes []No        Vision Loss:           []No [x]Yes            Corrective Device:  [x]Yes []No        Memory Loss:       [x]No []Yes            []Short Term []Long Term  Motivational Level:  []Asks Questions                  []Extremely Anxious       []Seems Interested               []Seems Uninterested                  [x]Denies need for Education  Risk for Injury:  [x]Patient oriented to person, place and time  []History of frequent falls/loss of balance  Nutritional:  []Change in appetite   []Weight Gain   []Weight Loss  Functional:  []Requires assistance with ADL's

## 2025-02-13 ENCOUNTER — TELEPHONE (OUTPATIENT)
Dept: PAIN MANAGEMENT | Age: 50
End: 2025-02-13

## 2025-02-13 NOTE — TELEPHONE ENCOUNTER
How much -percent wise- did the shot help?      %  What is your current pain level now?                         Has your activity level increased since the shot?   Left vm

## 2025-02-17 DIAGNOSIS — G89.4 CHRONIC PAIN SYNDROME: ICD-10-CM

## 2025-02-17 RX ORDER — BUPRENORPHINE 7.5 UG/H
1 PATCH TRANSDERMAL WEEKLY
Qty: 4 PATCH | Refills: 0 | Status: SHIPPED | OUTPATIENT
Start: 2025-02-20 | End: 2025-03-20

## 2025-02-17 NOTE — TELEPHONE ENCOUNTER
Patient called on 2/14/25.  The office is closed on Fridays.    Last seen in office visit: 1/20/25  Next scheduled office visit: 4/23/25 with Julienne Mejia UDS results: neg for Alprazolam (from PCP), ok per provider (Billie)    Any discrepancies on Julian (such as refills from another provider): none

## 2025-02-27 ENCOUNTER — HOSPITAL ENCOUNTER (OUTPATIENT)
Dept: PAIN MANAGEMENT | Age: 50
Discharge: HOME | End: 2025-02-27
Payer: MEDICARE

## 2025-02-27 VITALS
SYSTOLIC BLOOD PRESSURE: 128 MMHG | OXYGEN SATURATION: 99 % | HEART RATE: 99 BPM | DIASTOLIC BLOOD PRESSURE: 77 MMHG | TEMPERATURE: 97 F | RESPIRATION RATE: 20 BRPM

## 2025-02-27 DIAGNOSIS — M62.830 SPASM OF MUSCLE OF LOWER BACK: ICD-10-CM

## 2025-02-27 DIAGNOSIS — M79.18 MYOFASCIAL PAIN: Primary | Chronic | ICD-10-CM

## 2025-02-27 PROCEDURE — 6360000002 HC RX W HCPCS

## 2025-02-27 PROCEDURE — 20553 NJX 1/MLT TRIGGER POINTS 3/>: CPT

## 2025-02-27 NOTE — PROCEDURES
PROCEDURE NOTE  Date: 2025   Name: Yesenia Ernandez  YOB: 1975    Procedures  [facility]    Patient Name: Yesenia Ernandez    : 1975    Age: 50 y.o.    Sex: female    Date: 2025    Pre-op Diagnosis: Myofascial Pain/ Muscle Spasms    Post-op Diagnosis: Myofascial Pain/ Muscle Spasms    Procedure: Lumbar Trigger Point Injections    Performing Procedure: Julienne Arriola APRN - CNP    Patient Vitals for the past 24 hrs:   BP Temp Temp src Pulse Resp SpO2   25 1306 128/77 97 °F (36.1 °C) Temporal 99 20 99 %       Description of Procedure:    After a brief physical assessment and failure to improve with conservative measures the patient presented for Lumbar Trigger Point Injections The indications, limitations and possible complications were discussed with the patient and the patient elected to proceed with the procedure.    After voluntary, informed and signed consent obtained the patient was placed in a seated position. Appropriate time out was obtained per policy.     The area of maximal tenderness was palpated over the bilaterally Lumbar Muscles - Lower Latissimus,  Erector Spinae, Lumbar Paraspinous. The skin overlying these areas was marked with a skin marker. The areas were prepped using aseptic technique with CHG prep. The skin overlying the proposed injection sites were then sprayed with Gebauer's Solution while protecting patient eyes. Each trigger point of the Lumbar Muscles - Lower Latissimus,  Erector Spinae, Lumbar Paraspinous was injected after negative aspiration was injected with approximately 1-2 ml of a solution of 5 ml of 1% Lidocaine Plain and 5 ml of 0.5% Marcaine Plain after negative aspiration    Discharge:  The patient tolerated the procedure well. There were no complications during the procedure and the patient was discharged home with discharge instructions.    The patient has been instructed to contact the office

## 2025-02-27 NOTE — DISCHARGE INSTRUCTIONS
[facility]  Post Procedure Discharge Instructions        YOU HAVE HAD THE FOLLOWING PROCEDURE:                                  [] Occipital Nerve Blocks  [] CTS wrist injection(s)  [] Knee Injection(s)         [] Shoulder Injection(s)   [] Elbow Injection(s)     [] Botox Injection  [] Cervical Trigger Point Injections    [] Thoracic Trigger Point Injections    [x] Lumbar Trigger Point Injections  [] Piriformis Trigger Point Injections  [] SI Joint Injection(s)     [] Trochanteric Bursa Injection(s)       [] Ankle Injection(s)   [] Plantar Fasciitis   []  ______________  Injection(s) [] Botox []  Migraines [] Spasticity    YOU HAVE RECEIVED THE FOLLOWING MEDICATIONS IN YOUR INJECTION(s)  [x] Lidocaine [x] Bupivacaine   [] DepoMedrol (steroid) [] Decadron (steroid)  []  Kenalog (steroid)   [] Toradol  [] Supartz [] Zilretta    [] Botox        PATIENT INFORMATION:   You may experience the following symptoms after your procedure. These symptoms are normal and should not cause concern:    You may have an increase in your pain. This may last 24 - 48 hours after your procedure.  You may have no change in the pain that you had prior to your injection(s).  You may have weakness or numbness in your affected extremity. If this occurs, this may last until numbing the medication wears off.     REPORT THE FOLLOWING SYMPTOMS TO YOUR DOCTOR:  Redness, swelling or drainage at the injection site(s)  Unusual pain that interferes with your normal activities of daily living.    OTHER INSTRUCTIONS:    [x] I will apply ice to the injection site(s) for at least 24 hours after the procedure. I will rotate the ice on for 20 minutes and off for 20 minutes for at least 24 hours.    [x] I will not apply heat for at least 48 hours and I will not take a hot bath or shower for at least 24 hours.     [x] I understand that if Lidocaine or Bupivacaine was used in my injection(s) that the injection site(s)

## 2025-02-27 NOTE — PROGRESS NOTES
[facility]   [address]  [facility] [address]  [phone] p  [phone]    Procedure:  Level of Consciousness: [x]Alert [x]Oriented []Disoriented []Lethargic  Anxiety Level: [x]Calm []Anxious []Depressed []Other  Skin: []Warm [x]Dry []Cool []Moist []Intact []Other  Cardiovascular: []Palpitations: [x]Never []Occasionally []Frequently  Chest Pain: [x]No []Yes  Respiratory:  [x]Unlabored []Labored []Cough ([] Productive []Unproductive)  HCG Required: [x]No []Yes   Results: []Negative []Positive  Knowledge Level:        [x]Patient/Other verbalized understanding of pre-procedure instructions.        [x]Assessment of post-op care needs (transportation, responsible caregiver)        [x]Able to discuss health care problems and how to deal with it.  Factors that Affect Teaching:        Language Barrier: [x]No []Yes - why:        Hearing Loss:        [x]No []Yes            Corrective Device:  []Yes []No        Vision Loss:           [x]No []Yes            Corrective Device:  []Yes []No        Memory Loss:       [x]No []Yes            []Short Term []Long Term  Motivational Level:  []Asks Questions                  []Extremely Anxious       []Seems Interested               [x]Seems Uninterested                  [x]Denies need for Education  Risk for Injury:  [x]Patient oriented to person, place and time  []History of frequent falls/loss of balance  Nutritional:  []Change in appetite   []Weight Gain   []Weight Loss  Functional:  []Requires assistance with ADL's

## 2025-03-06 ENCOUNTER — TELEPHONE (OUTPATIENT)
Dept: PAIN MANAGEMENT | Age: 50
End: 2025-03-06

## 2025-03-06 ENCOUNTER — HOSPITAL ENCOUNTER (OUTPATIENT)
Dept: PAIN MANAGEMENT | Age: 50
Discharge: HOME | End: 2025-03-06
Payer: MEDICARE

## 2025-03-06 VITALS
SYSTOLIC BLOOD PRESSURE: 149 MMHG | DIASTOLIC BLOOD PRESSURE: 72 MMHG | OXYGEN SATURATION: 98 % | TEMPERATURE: 97.9 F | RESPIRATION RATE: 20 BRPM | HEART RATE: 105 BPM

## 2025-03-06 DIAGNOSIS — M53.3 SI (SACROILIAC) JOINT DYSFUNCTION: Primary | Chronic | ICD-10-CM

## 2025-03-06 DIAGNOSIS — M79.18 MYOFASCIAL PAIN: Chronic | ICD-10-CM

## 2025-03-06 PROCEDURE — 76942 ECHO GUIDE FOR BIOPSY: CPT

## 2025-03-06 PROCEDURE — 20552 NJX 1/MLT TRIGGER POINT 1/2: CPT

## 2025-03-06 PROCEDURE — 6360000002 HC RX W HCPCS

## 2025-03-06 PROCEDURE — 20611 DRAIN/INJ JOINT/BURSA W/US: CPT

## 2025-03-06 RX ORDER — ETHYL CHLORIDE 100 %
AEROSOL, SPRAY (ML) TOPICAL PRN
Status: DISCONTINUED | OUTPATIENT
Start: 2025-03-06 | End: 2025-03-08 | Stop reason: HOSPADM

## 2025-03-06 RX ORDER — TRIAMCINOLONE ACETONIDE 40 MG/ML
80 INJECTION, SUSPENSION INTRA-ARTICULAR; INTRAMUSCULAR ONCE
Status: DISCONTINUED | OUTPATIENT
Start: 2025-03-06 | End: 2025-03-08 | Stop reason: HOSPADM

## 2025-03-06 RX ORDER — LIDOCAINE HYDROCHLORIDE 10 MG/ML
2 INJECTION, SOLUTION EPIDURAL; INFILTRATION; INTRACAUDAL; PERINEURAL ONCE
Status: DISCONTINUED | OUTPATIENT
Start: 2025-03-06 | End: 2025-03-08 | Stop reason: HOSPADM

## 2025-03-06 RX ORDER — BUPIVACAINE HYDROCHLORIDE 5 MG/ML
2 INJECTION, SOLUTION EPIDURAL; INTRACAUDAL ONCE
Status: DISCONTINUED | OUTPATIENT
Start: 2025-03-06 | End: 2025-03-08 | Stop reason: HOSPADM

## 2025-03-06 NOTE — TELEPHONE ENCOUNTER
How much did the shot help?    80     %   What is your current pain level now?     4/10 sore                    Has your activity level increased since the shot?   No remained.     Electronically signed by Remigio Cage RN on 3/6/2025 at 11:56 AM

## 2025-03-06 NOTE — PROGRESS NOTES
Procedure:  Level of Consciousness: [x]Alert [x]Oriented []Disoriented []Lethargic  Anxiety Level: [x]Calm []Anxious []Depressed []Other  Skin: []Warm [x]Dry []Cool []Moist []Intact []Other  Cardiovascular: []Palpitations: [x]Never []Occasionally []Frequently  Chest Pain: [x]No []Yes  Respiratory:  [x]Unlabored []Labored []Cough ([] Productive []Unproductive)  HCG Required: [x]No []Yes   Results: []Negative []Positive  Knowledge Level:        [x]Patient/Other verbalized understanding of pre-procedure instructions.        [x]Assessment of post-op care needs (transportation, responsible caregiver)        [x]Able to discuss health care problems and how to deal with it.  Factors that Affect Teaching:        Language Barrier: [x]No []Yes - why:        Hearing Loss:        [x]No []Yes            Corrective Device:  []Yes []No        Vision Loss:           [x]No []Yes            Corrective Device:  []Yes []No        Memory Loss:       [x]No []Yes            []Short Term []Long Term  Motivational Level:  [x]Asks Questions                  []Extremely Anxious       [x]Seems Interested               []Seems Uninterested                  []Denies need for Education  Risk for Injury:  [x]Patient oriented to person, place and time  []History of frequent falls/loss of balance  Nutritional:  []Change in appetite   []Weight Gain   []Weight Loss  Functional:  []Requires assistance with ADL's

## 2025-03-06 NOTE — DISCHARGE INSTRUCTIONS
[facility]  Post Procedure Discharge Instructions        YOU HAVE HAD THE FOLLOWING PROCEDURE:                                  [] Occipital Nerve Blocks  [] CTS wrist injection(s)  [] Knee Injection(s)         [] Shoulder Injection(s)   [] Elbow Injection(s)     [] Botox Injection  [] Cervical Trigger Point Injections    [] Thoracic Trigger Point Injections    [] Lumbar Trigger Point Injections  [] Piriformis Trigger Point Injections  [x] SI Joint Injection(s)     [] Trochanteric Bursa Injection(s)       [] Ankle Injection(s)   [] Plantar Fasciitis   []  ______________  Injection(s) [] Botox []  Migraines [] Spasticity    YOU HAVE RECEIVED THE FOLLOWING MEDICATIONS IN YOUR INJECTION(s)  [x] Lidocaine [x] Bupivacaine   [] DepoMedrol (steroid) [] Decadron (steroid)  [x]  Kenalog (steroid)   [] Toradol  [] Supartz [] Zilretta    [] Botox        PATIENT INFORMATION:   You may experience the following symptoms after your procedure. These symptoms are normal and should not cause concern:    You may have an increase in your pain. This may last 24 - 48 hours after your procedure.  You may have no change in the pain that you had prior to your injection(s).  You may have weakness or numbness in your affected extremity. If this occurs, this may last until numbing the medication wears off.     REPORT THE FOLLOWING SYMPTOMS TO YOUR DOCTOR:  Redness, swelling or drainage at the injection site(s)  Unusual pain that interferes with your normal activities of daily living.    OTHER INSTRUCTIONS:    [x] I will apply ice to the injection site(s) for at least 24 hours after the procedure. I will rotate the ice on for 20 minutes and off for 20 minutes for at least 24 hours.    [] I will not apply heat for at least 48 hours and I will not take a hot bath or shower for at least 24 hours.     [x] I understand that if Lidocaine or Bupivacaine was used in my injection(s) that the injection site(s)

## 2025-03-06 NOTE — PROCEDURES
PROCEDURE NOTE  Date: 3/6/2025   Name: Yesenia Ernandez  YOB: 1975    Procedures    [facility]    Patient Name: Yesenia Ernandez    : 1975    Age: 50 y.o.    Sex: female    Date: 2025    Pre-op Diagnosis: bilateral  myalgia    Post-op Diagnosis: bilateral  myalgia    Procedure: Ultrasound Guided Injection of  bilateral Sacroiliac Joint(s)     Performing Procedure:  Julienne Arriola APRN - CNP    Patient Vitals for the past 24 hrs:   BP Temp Temp src Pulse Resp SpO2   25 1255 (!) 149/72 97.9 °F (36.6 °C) Temporal (!) 105 20 98 %       bilateral  Sacroiliac Joint(s)     Description of Procedure:    After voluntary, informed and signed consent obtained the patient was placed in a prone position. Appropriate time out was obtained per policy. The Sacroiliac Joint(s) was palpated for area of maximal tenderness. The area was prepped in an aseptic fashion with CHG swab. The ultrasound transducer was used to confirm the appropriate location. The skin was sprayed with Gebauer's Solution. Under aseptic technique and direct ultrasound visualization a 22 gauge 3 inch spinal needle was introduced into the Sacroiliac Joint(s). Patient was asked if any new pain was radiating down the ipsilateral lower extremity. If patient noted radiating pain the needle was readjusted until radiating pain was gone. After a negative aspiration, a solution of 1 ml of 0.5% Marcaine Plain and 1 ml of 1% Lidocaine Plain      [x] 1 ml of Kenalog (40mg/ml)    [] 0.5 ml of DepoMedrol (80mg/ml)    [] 1 ml of DepoMedrol (80mg/ml)     [] 1 ml of Toradol (30mg/ml)     was injected into the Sacroiliac Joint(s). The needle was withdrawn and a sterile dressing applied. If this was a bilateral procedure, the same steps were followed on the opposite side.     Discharge:    The patient tolerated the procedure well. There were no complications during the procedure and the patient was discharged home

## 2025-03-12 ENCOUNTER — TELEPHONE (OUTPATIENT)
Dept: PAIN MANAGEMENT | Age: 50
End: 2025-03-12

## 2025-03-12 NOTE — TELEPHONE ENCOUNTER
Placed follow up call regarding bilateral SI injections administered on 3/6/25.  No answer.  Left voicemail message.Electronically signed by Sunita Isidro RN on 3/12/2025 at 3:22 PM

## 2025-03-26 DIAGNOSIS — G43.019 INTRACTABLE MIGRAINE WITHOUT AURA AND WITHOUT STATUS MIGRAINOSUS: Chronic | ICD-10-CM

## 2025-03-26 DIAGNOSIS — G89.4 CHRONIC PAIN SYNDROME: ICD-10-CM

## 2025-03-26 NOTE — TELEPHONE ENCOUNTER
Last seen in office visit: 1/20/25  Next scheduled office visit: 4/23/25 with Julienne  Last UDS results: compliant  Any discrepancies on Julian (such as refills from another provider): none

## 2025-03-27 RX ORDER — BUPRENORPHINE 7.5 UG/H
1 PATCH TRANSDERMAL WEEKLY
Qty: 4 PATCH | Refills: 0 | Status: SHIPPED | OUTPATIENT
Start: 2025-03-29 | End: 2025-04-26

## 2025-03-27 RX ORDER — UBROGEPANT 100 MG/1
1 TABLET ORAL DAILY PRN
Qty: 16 TABLET | Refills: 2 | Status: SHIPPED | OUTPATIENT
Start: 2025-03-27

## 2025-04-15 ENCOUNTER — HOSPITAL ENCOUNTER (OUTPATIENT)
Dept: PAIN MANAGEMENT | Age: 50
Discharge: HOME | End: 2025-04-15
Payer: MEDICARE

## 2025-04-15 VITALS
SYSTOLIC BLOOD PRESSURE: 133 MMHG | HEART RATE: 99 BPM | RESPIRATION RATE: 16 BRPM | DIASTOLIC BLOOD PRESSURE: 96 MMHG | OXYGEN SATURATION: 100 % | TEMPERATURE: 96.9 F

## 2025-04-15 PROCEDURE — 2580000003 HC RX 258

## 2025-04-15 PROCEDURE — 64616 CHEMODENERV MUSC NECK DYSTON: CPT

## 2025-04-15 PROCEDURE — 64646 CHEMODENERV TRUNK MUSC 1-5: CPT

## 2025-04-15 PROCEDURE — 6360000002 HC RX W HCPCS

## 2025-04-15 RX ORDER — SODIUM CHLORIDE 9 MG/ML
4.5 INJECTION, SOLUTION INTRAMUSCULAR; INTRAVENOUS; SUBCUTANEOUS ONCE
Status: DISCONTINUED | OUTPATIENT
Start: 2025-04-15 | End: 2025-04-17 | Stop reason: HOSPADM

## 2025-04-15 NOTE — INTERVAL H&P NOTE
Update History & Physical    The patient's History and Physical  was reviewed with the patient and I examined the patient. There was no change. The surgical site was confirmed by the patient and me.     Plan: The risks, benefits, expected outcome, and alternative to the recommended procedure have been discussed with the patient. Patient understands and wants to proceed with the procedure.     Electronically signed by Da Briones MD on 4/15/2025 at 1:01 PM

## 2025-04-18 ENCOUNTER — OFFICE VISIT (OUTPATIENT)
Dept: FAMILY MEDICINE CLINIC | Facility: CLINIC | Age: 50
End: 2025-04-18
Payer: MEDICARE

## 2025-04-18 VITALS
HEIGHT: 64 IN | HEART RATE: 93 BPM | TEMPERATURE: 97.5 F | WEIGHT: 179.2 LBS | OXYGEN SATURATION: 97 % | SYSTOLIC BLOOD PRESSURE: 128 MMHG | RESPIRATION RATE: 12 BRPM | BODY MASS INDEX: 30.59 KG/M2 | DIASTOLIC BLOOD PRESSURE: 78 MMHG

## 2025-04-18 DIAGNOSIS — S81.852A CAT BITE OF LEFT LOWER LEG, INITIAL ENCOUNTER: Primary | ICD-10-CM
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
## 2025-04-18 RX ORDER — ALPRAZOLAM 0.5 MG
0.5 TABLET ORAL 3 TIMES DAILY PRN
COMMUNITY
Start: 2025-03-21

## 2025-04-18 RX ORDER — OLANZAPINE 5 MG/1
5 TABLET ORAL DAILY
COMMUNITY
Start: 2025-03-21

## 2025-04-18 RX ORDER — MUPIROCIN 20 MG/G
1 OINTMENT TOPICAL 3 TIMES DAILY
Qty: 15 G | Refills: 0 | Status: SHIPPED | OUTPATIENT
Start: 2025-04-18

## 2025-04-18 RX ORDER — BUPRENORPHINE 5 UG/H
1 PATCH TRANSDERMAL WEEKLY
COMMUNITY
Start: 2025-01-21

## 2025-04-18 NOTE — PROGRESS NOTES
"Chief Complaint  Animal Bite (Left leg)    Subjective      Loni Peña presents to [facility]  HPI: Patient is a 50 y.o. female who is here today with complaint of cat bite to left shin/calf region. Reports her cats got into a fight approximately 1 week ago and one of her cats bit her. She has 4 bite marks to left leg with surrounding redness. Denies fever or any other symptoms.     Objective   Vital Signs:  /78 (BP Location: Right arm, Patient Position: Sitting, Cuff Size: Adult)   Pulse 93   Temp 97.5 °F (36.4 °C)   Resp 12   Ht 162.6 cm (64.02\")   Wt 81.3 kg (179 lb 3.2 oz)   SpO2 97%   BMI 30.74 kg/m²   Estimated body mass index is 30.74 kg/m² as calculated from the following:    Height as of this encounter: 162.6 cm (64.02\").    Weight as of this encounter: 81.3 kg (179 lb 3.2 oz).          Physical Exam  Constitutional:       Appearance: Normal appearance.   Cardiovascular:      Rate and Rhythm: Normal rate and regular rhythm.   Pulmonary:      Effort: Pulmonary effort is normal.      Breath sounds: Normal breath sounds.   Skin:     Findings: Wound (4 open wounds to anterior left lower extremity resembling bite marks with surrounding erythema) present.   Neurological:      Mental Status: She is alert.   Psychiatric:         Mood and Affect: Mood normal.        Result Review :  The following labs/imaging/notes were reviewed and discussed with the patient by JARED Phipps on 04/18/2025:             Assessment and Plan   Diagnoses and all orders for this visit:    1. Cat bite of left lower leg, initial encounter (Primary)  -     amoxicillin-clavulanate (AUGMENTIN) 875-125 MG per tablet; Take 1 tablet by mouth 2 (Two) Times a Day for 14 days.  Dispense: 28 tablet; Refill: 0  -     mupirocin (BACTROBAN) 2 % ointment; Apply 1 Application topically to the appropriate area as directed 3 (Three) Times a Day.  Dispense: 15 g; Refill: 0      Advised to clean open " areas 3 times daily with Dial bar soap, rinse good and allow to dry, then apply antbiotic ointment. I have sent oral antibiotic to treat, take as directed. If symptoms persist or worsen, fever occurs go to ER.          Follow Up  No follow-ups on file.  Patient was given instructions and counseling regarding her condition or for health maintenance advice. Please see specific information pulled into the AVS if appropriate.

## 2025-04-23 ENCOUNTER — OFFICE VISIT (OUTPATIENT)
Dept: PAIN MANAGEMENT | Age: 50
End: 2025-04-23
Payer: MEDICARE

## 2025-04-23 VITALS
OXYGEN SATURATION: 100 % | WEIGHT: 179.8 LBS | SYSTOLIC BLOOD PRESSURE: 136 MMHG | HEART RATE: 95 BPM | DIASTOLIC BLOOD PRESSURE: 89 MMHG | RESPIRATION RATE: 16 BRPM | HEIGHT: 63 IN | TEMPERATURE: 97.7 F | BODY MASS INDEX: 31.86 KG/M2

## 2025-04-23 DIAGNOSIS — M43.6 ANTEROCOLLIS: ICD-10-CM

## 2025-04-23 DIAGNOSIS — M53.3 SI (SACROILIAC) JOINT DYSFUNCTION: Chronic | ICD-10-CM

## 2025-04-23 DIAGNOSIS — G43.019 INTRACTABLE MIGRAINE WITHOUT AURA AND WITHOUT STATUS MIGRAINOSUS: Chronic | ICD-10-CM

## 2025-04-23 DIAGNOSIS — M79.18 MYOFASCIAL PAIN: Chronic | ICD-10-CM

## 2025-04-23 DIAGNOSIS — G89.4 CHRONIC PAIN SYNDROME: Primary | ICD-10-CM

## 2025-04-23 PROCEDURE — 99214 OFFICE O/P EST MOD 30 MIN: CPT

## 2025-04-23 RX ORDER — BUPRENORPHINE 10 UG/H
1 PATCH TRANSDERMAL WEEKLY
Qty: 4 PATCH | Refills: 0 | Status: SHIPPED | OUTPATIENT
Start: 2025-04-23 | End: 2025-05-21

## 2025-04-23 ASSESSMENT — ENCOUNTER SYMPTOMS
EYES NEGATIVE: 1
GASTROINTESTINAL NEGATIVE: 1
BACK PAIN: 1
RESPIRATORY NEGATIVE: 1

## 2025-04-23 NOTE — PROGRESS NOTES
[facility]  [address]  [place] [address]  Phone: [phone]  Fax: [phone]    Follow Up Office Visit    Patient Name: Yesenia Ernandez    MR #: [id number]    Account #:    : 1975    Age: 50 y.o.    Sex: female    Date: 2025     PCP: Demond Burroughs MD    Chief Complaint:   Chief Complaint   Patient presents with    Back Pain     2/10    Knee Pain     3/10;BILATERAL    Headache     /10       History of Present Illness:   History of Present Illness  The patient is a 50-year-old female who presents for evaluation of chronic generalized pain, chronic back pain, knee pain, headaches, and migraines.    She reports that the injections have been beneficial, particularly in maintaining her mobility. She experiences pain predominantly in her hips, knees, and cervical region. She has been alternating between ibuprofen and Tylenol for pain management. She has been receiving Botox injections for migraines and cervical dystonia. Additionally, she has undergone bilateral knee injections, sacroiliac joint injections, and lumbar trigger point injections. She is currently on a Butrans patch 7.5 for pain management, which initially provided significant relief but has become less effective over time.    She continues to take Qulipta 60 mg and Ubrelvy 100 mg for her migraines, which have significantly reduced in frequency in conjunction with botox therapy. She uses Ubrelvy sparingly, approximately once a month. She is no longer on trazodone and has decreased use of Xanax, which she uses infrequently. She also has naloxone nasal spray at home.    She has not been experiencing discomfort in her SI joints since previous injections. She is not currently experiencing sciatic pain.    She sustained a cat bite on her leg from her own pet and has been on amoxicillin for a week. She was advised to visit the ER if the condition worsens. The wound was initially larger and has been

## 2025-05-01 ENCOUNTER — HOSPITAL ENCOUNTER (OUTPATIENT)
Dept: PAIN MANAGEMENT | Age: 50
Discharge: HOME | End: 2025-05-01
Payer: MEDICARE

## 2025-05-01 VITALS
RESPIRATION RATE: 18 BRPM | DIASTOLIC BLOOD PRESSURE: 72 MMHG | TEMPERATURE: 96.7 F | SYSTOLIC BLOOD PRESSURE: 133 MMHG | OXYGEN SATURATION: 96 % | HEART RATE: 100 BPM

## 2025-05-01 DIAGNOSIS — G43.019 INTRACTABLE MIGRAINE WITHOUT AURA AND WITHOUT STATUS MIGRAINOSUS: Primary | Chronic | ICD-10-CM

## 2025-05-01 PROCEDURE — 2580000003 HC RX 258

## 2025-05-01 PROCEDURE — 64615 CHEMODENERV MUSC MIGRAINE: CPT

## 2025-05-01 PROCEDURE — 6360000002 HC RX W HCPCS

## 2025-05-01 RX ORDER — SODIUM CHLORIDE 9 MG/ML
4.5 INJECTION, SOLUTION INTRAMUSCULAR; INTRAVENOUS; SUBCUTANEOUS ONCE
Status: DISCONTINUED | OUTPATIENT
Start: 2025-05-01 | End: 2025-05-03 | Stop reason: HOSPADM

## 2025-05-01 NOTE — PROCEDURES
PROCEDURE NOTE  Date: 2025   Name: Yesenia Ernandez  YOB: 1975    Procedures    [facility]    Patient Name: Yesenia Ernandez    : 1975                    Age: 50 y.o.    Sex: female    Date: May 1, 2025    Pre-op Diagnosis: Chronic Migraines    Post-op Diagnosis: Chronic Migraines    Procedure: Botox injections x 155 units (45 units wasted)    Performing Procedure:  Julienne Arriola APRN - CNP    Patient Vitals for the past 24 hrs:   BP Temp Temp src Pulse Resp SpO2   25 1003 133/72 (!) 96.7 °F (35.9 °C) Temporal 100 18 96 %       Previous Injections:  Patient had 15/2 headaches/migraines over the past month.     Indications:    Yesenia Ernandez has been treated for problems with chronic migraine headaches. The patient was taken through a conservative course of treatment without complete resolution of symptoms. Botox injection therapy was offered and after the risks and benefits of the procedure were explained to the patient, we had agreed to proceed.    The patient was taken to the procedure room and placed in the seated position. The following muscles were identified using palpation and standard landmarks. These muscles were marked and prepped with alcohol. A total of 155 units were injected after careful aspiration and the following distribution bilaterally:  10 units in 2 sites, procerus 5 units in one site, frontalis 20 units in 4 sites, temporalis 40 unit in 8 sites, occipitals 30 units in 6 sites, cervical paraspinals 20 units in 4 sites, and trapezius 30 units in 6 sites.    Discharge:  The patient tolerated the procedure well. There were no complications during the procedure and the patient was discharged home with discharge instructions.    The patient has been instructed to contact the office should there be any complications or questions to arise between today and their next appointment.    Plan:  [x] Will return to the office

## 2025-05-01 NOTE — PROGRESS NOTES
[facility]   [address]  [facility] [address]  [phone] p  [phone]    Procedure:  Level of Consciousness: [x]Alert [x]Oriented []Disoriented []Lethargic  Anxiety Level: [x]Calm []Anxious []Depressed []Other  Skin: []Warm [x]Dry []Cool []Moist []Intact []Other  Cardiovascular: []Palpitations: [x]Never []Occasionally []Frequently  Chest Pain: [x]No []Yes  Respiratory:  [x]Unlabored []Labored []Cough ([] Productive []Unproductive)  HCG Required: [x]No []Yes   Results: []Negative []Positive  Knowledge Level:        [x]Patient/Other verbalized understanding of pre-procedure instructions.        [x]Assessment of post-op care needs (transportation, responsible caregiver)        [x]Able to discuss health care problems and how to deal with it.  Factors that Affect Teaching:        Language Barrier: [x]No []Yes - why:        Hearing Loss:        [x]No []Yes            Corrective Device:  []Yes []No        Vision Loss:           []No [x]Yes            Corrective Device:  [x]Yes []No        Memory Loss:       [x]No []Yes            []Short Term []Long Term  Motivational Level:  []Asks Questions                  []Extremely Anxious       []Seems Interested               []Seems Uninterested                  [x]Denies need for Education  Risk for Injury:  [x]Patient oriented to person, place and time  []History of frequent falls/loss of balance  Nutritional:  []Change in appetite   []Weight Gain   []Weight Loss  Functional:  []Requires assistance with ADL's      Migraine  Follow up Assessment:  Patient experiences 23 headaches per month  Patient states that he/she has  21 headaches out of 30 days each month.  Patient states that he/she has  2 migraines out of 30 days each month.  Patient has experienced a  reduction in Migraine headaches less than 15 days per month [x]Yes []No  Patient has experienced a reduction in Migraine hours [x]Yes []No

## 2025-05-01 NOTE — DISCHARGE INSTRUCTIONS
will be numb for a few hours after the procedure    [] I understand if a steroid was used it will take effect in 3 - 7 days. I understand that as the numbing medication wears off, the pain may return until the steroids start working.    [] I understand that today I will rest for the next 24 hours and drink plenty of water.    [x] For Botox for Migraines please do not wear anything constricting around the forehead for 7-10 days post injection. Examples headband, hats, or bandana    [] For Botox for Spasticity start therapy for the affected limb in two weeks.    [] Additional instructions: ______________________________________________ ___________________________________________________________________    Sedation:  Was oral sedation given?    [] Yes  [x] No    I understand that if I took an oral nerve calming medication I will not drive for  [] 24 hours after taking the medication.    [x] I have received a copy of my discharge instructions and understand the above instructions to the best of my knowledge    Patient Discharged:  [x] Home  [] Hospital  [] Other  ____________________________________________    Via:  [x] Ambulatory  [] Wheelchair   [] Stretcher [] EMS       Accompanied By:   [] Significant other  [] Family Member  [] Friend   [] Hospital Staff  []  Ambulance Staff  [x] Other_______________________________________________    Plan:  [] Will return to the office in   [] 1 month   [] 3 months for:  [] Procedure Follow-up  [x] Office Visit   [x] Planned Procedure        [] Printed AVS and given to patient.  [x] Patient has mychart and does not wish for AVS to be printed.      If you have questions, problems or concerns you may call [phone] and follow the prompts

## 2025-05-22 ENCOUNTER — TELEPHONE (OUTPATIENT)
Dept: PAIN MANAGEMENT | Age: 50
End: 2025-05-22

## 2025-05-22 ENCOUNTER — HOSPITAL ENCOUNTER (OUTPATIENT)
Dept: PAIN MANAGEMENT | Age: 50
Discharge: HOME | End: 2025-05-22
Payer: MEDICARE

## 2025-05-22 VITALS
RESPIRATION RATE: 18 BRPM | OXYGEN SATURATION: 99 % | HEART RATE: 101 BPM | DIASTOLIC BLOOD PRESSURE: 76 MMHG | SYSTOLIC BLOOD PRESSURE: 145 MMHG

## 2025-05-22 DIAGNOSIS — M25.561 CHRONIC PAIN OF BOTH KNEES: ICD-10-CM

## 2025-05-22 DIAGNOSIS — G89.29 CHRONIC PAIN OF BOTH KNEES: ICD-10-CM

## 2025-05-22 DIAGNOSIS — M25.562 CHRONIC PAIN OF BOTH KNEES: ICD-10-CM

## 2025-05-22 DIAGNOSIS — G89.4 CHRONIC PAIN SYNDROME: Primary | ICD-10-CM

## 2025-05-22 DIAGNOSIS — M17.10 ARTHRITIS OF KNEE: Primary | ICD-10-CM

## 2025-05-22 PROCEDURE — 20611 DRAIN/INJ JOINT/BURSA W/US: CPT

## 2025-05-22 PROCEDURE — 6360000002 HC RX W HCPCS

## 2025-05-22 RX ORDER — LIDOCAINE HYDROCHLORIDE 10 MG/ML
4 INJECTION, SOLUTION EPIDURAL; INFILTRATION; INTRACAUDAL; PERINEURAL ONCE
Status: DISCONTINUED | OUTPATIENT
Start: 2025-05-22 | End: 2025-05-24 | Stop reason: HOSPADM

## 2025-05-22 RX ORDER — BUPRENORPHINE 15 UG/H
1 PATCH TRANSDERMAL WEEKLY
Qty: 4 PATCH | Refills: 0 | Status: SHIPPED | OUTPATIENT
Start: 2025-05-22 | End: 2025-06-19

## 2025-05-22 RX ORDER — ETHYL CHLORIDE 100 %
AEROSOL, SPRAY (ML) TOPICAL ONCE
Status: DISCONTINUED | OUTPATIENT
Start: 2025-05-22 | End: 2025-05-24 | Stop reason: HOSPADM

## 2025-05-22 NOTE — TELEPHONE ENCOUNTER
1. Chronic pain syndrome      Requested Prescriptions     Pending Prescriptions Disp Refills    buprenorphine (BUTRANS) 15 MCG/HR PTWK 4 patch 0     Sig: Place 1 patch onto the skin once a week for 28 days. May fill 5/22/2025 Max Daily Amount: 1 patch     medication increased with refill sent at appointment yes; refill sent to medical director at appointment no, see refill encounter dated 5/22/2025    Electronically signed by AMNA Morrow CNP on 5/22/2025 at 2:19 PM

## 2025-05-22 NOTE — PROCEDURES
PROCEDURE NOTE  Date: 2025   Name: Yesenia Ernandez  YOB: 1975    Procedures    [facility]    Patient Name: Yesenia Ernandez    : 1975    Age: 50 y.o.    Sex: female    Date: May 22, 2025    Preop Diagnosis: Arthritis of bilateral Knee(s)    Postop Diagnosis: Arthritis of  bilateral  Knee(s)      Procedure: Injection of  bilateral  Knee(s) with US confirmation    Performing Procedure: Julienne Arriola APRN - CNP    Patient Vitals for the past 24 hrs:   BP Temp src Pulse Resp SpO2   25 1345 (!) 145/76 Temporal (!) 101 18 99 %       Description of Procedure:    After a brief physical assessment and failure to improve with conservative measures the patient presented for an injection of the bilateral Knee(s). The indications, limitations and possible complications were discussed with the patient and the patient elected to proceed with the procedure.    bilateral Knee(s)    After voluntary, informed and signed consent obtained the patient was placed in a seated position. Appropriate time out was obtained per policy. The knee was palpated at the anterior aspect of the knee to locate the patellar tendon. The projected injection site is approximately 1 cm medially or laterally of the midpoint of the tendon. Usually, there is a depression. The area was marked. The ultrasound transducer was used to confirm the appropriate location. The skin prepped in an aseptic fashion with CHG swab and then sprayed with Gebauer's Solution. Under aseptic technique 22 gauge 1 1/2 inch needle was then introduced into the Knee(s). After a negative aspiration, a solution of 1 ml of 0.5% Marcaine Plain, 1 ml of 1% Lidocaine Plain and     [] 1 ml of Toradol (30 mg/ml)     [] 1 ml of Kenalog (40mg/ml)    []  0.5 ml of DepoMedrol (80mg/ml)    [] 1 ml of DepoMedrol (80mg/ml)    OR    [x] After negative aspiration, 1 ml of 1% Lidocaine Plain was injected into the knee prior to the 5 ml

## 2025-05-22 NOTE — DISCHARGE INSTRUCTIONS
[facility]  Post Procedure Discharge Instructions        YOU HAVE HAD THE FOLLOWING PROCEDURE:                                  [] Occipital Nerve Blocks  [] CTS wrist injection(s)  [x] Knee Injection(s)         [] Shoulder Injection(s)   [] Elbow Injection(s)     [] Botox Injection  [] Cervical Trigger Point Injections    [] Thoracic Trigger Point Injections    [] Lumbar Trigger Point Injections  [] Piriformis Trigger Point Injections  [] SI Joint Injection(s)     [] Trochanteric Bursa Injection(s)       [] Ankle Injection(s)   [] Plantar Fasciitis   []  ______________  Injection(s) [] Botox []  Migraines [] Spasticity    YOU HAVE RECEIVED THE FOLLOWING MEDICATIONS IN YOUR INJECTION(s)  [x] Lidocaine [] Bupivacaine   [] DepoMedrol (steroid) [] Decadron (steroid)  []  Kenalog (steroid)   [] Toradol  [] Supartz [x] Zilretta    [] Botox        PATIENT INFORMATION:   You may experience the following symptoms after your procedure. These symptoms are normal and should not cause concern:    You may have an increase in your pain. This may last 24 - 48 hours after your procedure.  You may have no change in the pain that you had prior to your injection(s).  You may have weakness or numbness in your affected extremity. If this occurs, this may last until numbing the medication wears off.     REPORT THE FOLLOWING SYMPTOMS TO YOUR DOCTOR:  Redness, swelling or drainage at the injection site(s)  Unusual pain that interferes with your normal activities of daily living.    OTHER INSTRUCTIONS:    [x] I will apply ice to the injection site(s) for at least 24 hours after the procedure. I will rotate the ice on for 20 minutes and off for 20 minutes for at least 24 hours.    [x] I will not apply heat for at least 48 hours and I will not take a hot bath or shower for at least 24 hours.     [x] I understand that if Lidocaine or Bupivacaine was used in my injection(s) that the injection site(s)

## 2025-05-29 ENCOUNTER — TELEPHONE (OUTPATIENT)
Dept: PAIN MANAGEMENT | Age: 50
End: 2025-05-29

## 2025-05-29 NOTE — TELEPHONE ENCOUNTER
How much did the shot help?         90   %     What is your current pain level now?    0/10                         Has your activity level increased since the shot?   Yes.    Electronically signed by Michael Cage RN on 5/29/2025 at 9:58 AM

## 2025-07-03 DIAGNOSIS — M79.18 MYOFASCIAL PAIN: Chronic | ICD-10-CM

## 2025-07-03 DIAGNOSIS — G43.019 INTRACTABLE MIGRAINE WITHOUT AURA AND WITHOUT STATUS MIGRAINOSUS: Chronic | ICD-10-CM

## 2025-07-03 NOTE — TELEPHONE ENCOUNTER
Last seen in office visit: 04/23/25  Next scheduled office visit: 07/23/25  Last UDS results:   Any discrepancies on Julian (such as refills from another provider):

## 2025-07-09 ENCOUNTER — TELEPHONE (OUTPATIENT)
Dept: GASTROENTEROLOGY | Facility: CLINIC | Age: 50
End: 2025-07-09
Payer: MEDICARE

## 2025-07-23 ENCOUNTER — OFFICE VISIT (OUTPATIENT)
Dept: PAIN MANAGEMENT | Age: 50
End: 2025-07-23
Payer: MEDICAID

## 2025-07-23 VITALS
HEART RATE: 93 BPM | RESPIRATION RATE: 16 BRPM | OXYGEN SATURATION: 97 % | SYSTOLIC BLOOD PRESSURE: 133 MMHG | BODY MASS INDEX: 32.6 KG/M2 | TEMPERATURE: 97.8 F | HEIGHT: 63 IN | DIASTOLIC BLOOD PRESSURE: 87 MMHG | WEIGHT: 184 LBS

## 2025-07-23 DIAGNOSIS — G89.29 CHRONIC PAIN OF BOTH KNEES: ICD-10-CM

## 2025-07-23 DIAGNOSIS — F11.90 CHRONIC, CONTINUOUS USE OF OPIOIDS: ICD-10-CM

## 2025-07-23 DIAGNOSIS — G89.29 CHRONIC NECK PAIN: ICD-10-CM

## 2025-07-23 DIAGNOSIS — G89.4 CHRONIC PAIN SYNDROME: Primary | ICD-10-CM

## 2025-07-23 DIAGNOSIS — M54.42 CHRONIC BILATERAL LOW BACK PAIN WITH BILATERAL SCIATICA: ICD-10-CM

## 2025-07-23 DIAGNOSIS — M25.561 CHRONIC PAIN OF BOTH KNEES: ICD-10-CM

## 2025-07-23 DIAGNOSIS — M79.18 MYOFASCIAL PAIN: Chronic | ICD-10-CM

## 2025-07-23 DIAGNOSIS — Z02.89 PAIN MEDICATION AGREEMENT SIGNED: ICD-10-CM

## 2025-07-23 DIAGNOSIS — M25.562 CHRONIC PAIN OF BOTH KNEES: ICD-10-CM

## 2025-07-23 DIAGNOSIS — M54.41 CHRONIC BILATERAL LOW BACK PAIN WITH BILATERAL SCIATICA: ICD-10-CM

## 2025-07-23 DIAGNOSIS — M17.0 PRIMARY OSTEOARTHRITIS OF BOTH KNEES: ICD-10-CM

## 2025-07-23 DIAGNOSIS — M54.2 CHRONIC NECK PAIN: ICD-10-CM

## 2025-07-23 DIAGNOSIS — G43.019 INTRACTABLE MIGRAINE WITHOUT AURA AND WITHOUT STATUS MIGRAINOSUS: Chronic | ICD-10-CM

## 2025-07-23 DIAGNOSIS — G89.29 CHRONIC BILATERAL LOW BACK PAIN WITH BILATERAL SCIATICA: ICD-10-CM

## 2025-07-23 PROCEDURE — 99214 OFFICE O/P EST MOD 30 MIN: CPT

## 2025-07-23 RX ORDER — BUPRENORPHINE HYDROCHLORIDE 150 UG/1
1 FILM, SOLUBLE BUCCAL EVERY 12 HOURS
Qty: 60 EACH | Refills: 0 | Status: SHIPPED | OUTPATIENT
Start: 2025-07-23 | End: 2025-08-22

## 2025-07-23 RX ORDER — UBROGEPANT 100 MG/1
1 TABLET ORAL DAILY PRN
Qty: 16 TABLET | Refills: 2 | Status: SHIPPED | OUTPATIENT
Start: 2025-07-23

## 2025-07-23 RX ORDER — IBUPROFEN 800 MG/1
800 TABLET, FILM COATED ORAL 2 TIMES DAILY PRN
Qty: 60 TABLET | Refills: 2 | Status: SHIPPED | OUTPATIENT
Start: 2025-07-23

## 2025-07-23 ASSESSMENT — PATIENT HEALTH QUESTIONNAIRE - PHQ9
SUM OF ALL RESPONSES TO PHQ QUESTIONS 1-9: 2
1. LITTLE INTEREST OR PLEASURE IN DOING THINGS: NOT AT ALL
SUM OF ALL RESPONSES TO PHQ QUESTIONS 1-9: 2
6. FEELING BAD ABOUT YOURSELF - OR THAT YOU ARE A FAILURE OR HAVE LET YOURSELF OR YOUR FAMILY DOWN: NOT AT ALL
8. MOVING OR SPEAKING SO SLOWLY THAT OTHER PEOPLE COULD HAVE NOTICED. OR THE OPPOSITE, BEING SO FIGETY OR RESTLESS THAT YOU HAVE BEEN MOVING AROUND A LOT MORE THAN USUAL: NOT AT ALL
7. TROUBLE CONCENTRATING ON THINGS, SUCH AS READING THE NEWSPAPER OR WATCHING TELEVISION: SEVERAL DAYS
3. TROUBLE FALLING OR STAYING ASLEEP: NOT AT ALL
10. IF YOU CHECKED OFF ANY PROBLEMS, HOW DIFFICULT HAVE THESE PROBLEMS MADE IT FOR YOU TO DO YOUR WORK, TAKE CARE OF THINGS AT HOME, OR GET ALONG WITH OTHER PEOPLE: NOT DIFFICULT AT ALL
2. FEELING DOWN, DEPRESSED OR HOPELESS: NOT AT ALL
4. FEELING TIRED OR HAVING LITTLE ENERGY: SEVERAL DAYS
9. THOUGHTS THAT YOU WOULD BE BETTER OFF DEAD, OR OF HURTING YOURSELF: NOT AT ALL
SUM OF ALL RESPONSES TO PHQ QUESTIONS 1-9: 2
5. POOR APPETITE OR OVEREATING: NOT AT ALL
SUM OF ALL RESPONSES TO PHQ QUESTIONS 1-9: 2

## 2025-07-23 ASSESSMENT — ENCOUNTER SYMPTOMS
BACK PAIN: 1
EYES NEGATIVE: 1
GASTROINTESTINAL NEGATIVE: 1
RESPIRATORY NEGATIVE: 1

## 2025-07-23 NOTE — PROGRESS NOTES
[facility]  [address] [address]  Phone: [phone]  Fax: [phone]    History & Physical    Patient Name: Yesenia Ernandez    MR #: [id number]    Account #:    : 1975    Age: 50 y.o.    Sex: female    Date: 2025     PCP: Светлана Suárez APRN - NP    Chief Complaint:   Chief Complaint   Patient presents with    Follow-up    Back Pain     6/10    Chronic Pain     7/10    Headache     Headache days 10/30, migraine days , 10/10    Knee Pain     5/10       History of Present Illness:   History of Present Illness  The patient presents to the office for an annual evaluation for the management of chronic pain and chronic migraines. She has been established with this office since 2018. Her past medical history includes ADHD, anemia, Chiari malformation type 1, depression, hyperlipidemia, obstructive sleep apnea, and migraines.    She reports experiencing chronic pain, rating it as a 7 out of 10 today. She has been undergoing Botox injections for cervical dystonia and migraines but was unable to continue Botox for cervical dystonia as the provider in the office currently does not accept [place] Medicaid. She did receive Botox for migraines, with the last session completed on 2025. She also received bilateral knee injections with Zilretta on 2025.    She continues to take her muscle relaxer three times a day without any issues. She mentions that her knees are causing her discomfort, with a pain level of 5 out of 10 at rest, which escalates to 6 or 7 out of 10 upon activity. The Zilretta injections have been beneficial in managing this pain. However, she expresses dissatisfaction with the Butrans patches, stating that they do not provide relief and have led to increased pain, necessitating prolonged periods of rest. She recalls that the first patch provided a day's relief, but subsequent patches have been ineffective. She has not yet

## 2025-07-24 ENCOUNTER — HOSPITAL ENCOUNTER (OUTPATIENT)
Dept: PAIN MANAGEMENT | Age: 50
Discharge: HOME | End: 2025-07-24
Payer: MEDICARE

## 2025-07-24 VITALS
OXYGEN SATURATION: 100 % | TEMPERATURE: 97.5 F | DIASTOLIC BLOOD PRESSURE: 74 MMHG | RESPIRATION RATE: 20 BRPM | SYSTOLIC BLOOD PRESSURE: 127 MMHG | HEART RATE: 94 BPM

## 2025-07-24 DIAGNOSIS — G43.019 INTRACTABLE MIGRAINE WITHOUT AURA AND WITHOUT STATUS MIGRAINOSUS: Primary | Chronic | ICD-10-CM

## 2025-07-24 PROCEDURE — 6360000002 HC RX W HCPCS

## 2025-07-24 PROCEDURE — 64615 CHEMODENERV MUSC MIGRAINE: CPT

## 2025-07-24 PROCEDURE — 2500000003 HC RX 250 WO HCPCS

## 2025-07-24 RX ORDER — SODIUM CHLORIDE 9 MG/ML
4.5 INJECTION, SOLUTION INTRAMUSCULAR; INTRAVENOUS; SUBCUTANEOUS ONCE
Status: DISCONTINUED | OUTPATIENT
Start: 2025-07-24 | End: 2025-07-26 | Stop reason: HOSPADM

## 2025-07-24 NOTE — PROGRESS NOTES
[facility]   [address]  [facility] [address]  [phone] p  [phone]    Procedure:  Level of Consciousness: [x]Alert [x]Oriented []Disoriented []Lethargic  Anxiety Level: [x]Calm []Anxious []Depressed []Other  Skin: []Warm [x]Dry []Cool []Moist []Intact []Other  Cardiovascular: []Palpitations: [x]Never []Occasionally []Frequently  Chest Pain: [x]No []Yes  Respiratory:  [x]Unlabored []Labored []Cough ([] Productive []Unproductive)  HCG Required: [x]No []Yes   Results: []Negative []Positive  Knowledge Level:        [x]Patient/Other verbalized understanding of pre-procedure instructions.        [x]Assessment of post-op care needs (transportation, responsible caregiver)        [x]Able to discuss health care problems and how to deal with it.  Factors that Affect Teaching:        Language Barrier: [x]No []Yes - why:        Hearing Loss:        [x]No []Yes            Corrective Device:  []Yes []No        Vision Loss:           [x]No []Yes            Corrective Device:  []Yes []No        Memory Loss:       [x]No []Yes            []Short Term []Long Term  Motivational Level:  []Asks Questions                  []Extremely Anxious       []Seems Interested               [x]Seems Uninterested                  [x]Denies need for Education  Risk for Injury:  [x]Patient oriented to person, place and time  []History of frequent falls/loss of balance  Nutritional:  []Change in appetite   []Weight Gain   []Weight Loss  Functional:

## 2025-07-24 NOTE — DISCHARGE INSTR - COC
spasticity M62.838    Anterocollis M43.6    Encounter for monitoring opioid maintenance therapy Z51.81, Z79.891    Pain medication agreement signed Z02.89    Chronic, continuous use of opioids F11.90    Weakness of trunk musculature M62.81    Mild episode of recurrent major depressive disorder F33.0    Chronic pain syndrome G89.4    Arthritis of knee M17.10    Primary osteoarthritis of both knees M17.0    Chronic neck pain M54.2, G89.29       Isolation/Infection:   Isolation            No Isolation          Patient Infection Status    None to display         Nurse Assessment:  Last Vital Signs: /74   Pulse 94   Temp 97.5 °F (36.4 °C) (Temporal)   Resp 20   SpO2 100%     Last documented pain score (0-10 scale):    Last Weight:   Wt Readings from Last 1 Encounters:   07/23/25 83.5 kg (184 lb)     Mental Status:  {IP PT MENTAL STATUS:20030}    IV Access:  { TAYE IV ACCESS:930650750}    Nursing Mobility/ADLs:  Walking   {CHP DME ADLs:181938789}  Transfer  {CHP DME ADLs:431853336}  Bathing  {CHP DME ADLs:987835963}  Dressing  {CHP DME ADLs:853127333}  Toileting  {CHP DME ADLs:504183477}  Feeding  {CHP DME ADLs:668073562}  Med Admin  {P DME ADLs:296817448}  Med Delivery   { TAYE MED Delivery:633032346}    Wound Care Documentation and Therapy:        Elimination:  Continence:   Bowel: {YES / NO:19727}  Bladder: {YES / NO:19727}  Urinary Catheter: {Urinary Catheter:155026968}   Colostomy/Ileostomy/Ileal Conduit: {YES / NO:19727}       Date of Last BM: ***  No intake or output data in the 24 hours ending 07/24/25 1131  No intake/output data recorded.    Safety Concerns:     { TAYE Safety Concerns:620024536}    Impairments/Disabilities:      { TAYE Impairments/Disabilities:988070729}    Nutrition Therapy:  Current Nutrition Therapy:   { TAYE Diet List:726823810}    Routes of Feeding: {CHP DME Other Feedings:648268974}  Liquids: {Slp liquid thickness:45381}  Daily Fluid Restriction: {CHP DME Yes amt

## 2025-07-24 NOTE — PROCEDURES
PROCEDURE NOTE  Date: 2025   Name: Yesenia Ernandez  YOB: 1975    Procedures  [facility]    Patient Name: Yesenia Ernandez    : 1975                    Age: 50 y.o.    Sex: female    Date: 2025    Pre-op Diagnosis: Chronic Migraines    Post-op Diagnosis: Chronic Migraines    Procedure: Botox injections x 155 units (45 units wasted)    Performing Procedure:  Julienne Arriola APRN - CNP    Patient Vitals for the past 24 hrs:   BP Temp Temp src Pulse Resp SpO2   25 1127 127/74 97.5 °F (36.4 °C) Temporal 94 20 100 %       Previous Injections:  Patient had 8/5 headaches/migraines over the past month.     Indications:    Yesenia Ernandez has been treated for problems with chronic migraine headaches. The patient was taken through a conservative course of treatment without complete resolution of symptoms. Botox injection therapy was offered and after the risks and benefits of the procedure were explained to the patient, we had agreed to proceed.    The patient was taken to the procedure room and placed in the seated position. The following muscles were identified using palpation and standard landmarks. These muscles were marked and prepped with alcohol. A total of 155 units were injected after careful aspiration and the following distribution bilaterally:  10 units in 2 sites, procerus 5 units in one site, frontalis 20 units in 4 sites, temporalis 40 unit in 8 sites, occipitals 30 units in 6 sites, cervical paraspinals 20 units in 4 sites, and trapezius 30 units in 6 sites.    Discharge:  The patient tolerated the procedure well. There were no complications during the procedure and the patient was discharged home with discharge instructions.    The patient has been instructed to contact the office should there be any complications or questions to arise between today and their next appointment.    Plan:  [x] Will return to the office in

## 2025-07-24 NOTE — DISCHARGE INSTRUCTIONS
[facility]  Post Procedure Discharge Instructions        YOU HAVE HAD THE FOLLOWING PROCEDURE:                                  [] Occipital Nerve Blocks  [] CTS wrist injection(s)  [] Knee Injection(s)         [] Shoulder Injection(s)   [] Elbow Injection(s)     [] Botox Injection  [] Cervical Trigger Point Injections    [] Thoracic Trigger Point Injections    [] Lumbar Trigger Point Injections  [] Piriformis Trigger Point Injections  [] SI Joint Injection(s)     [] Trochanteric Bursa Injection(s)       [] Ankle Injection(s)   [] Plantar Fasciitis   []  ______________  Injection(s) [x] Botox [x]  Migraines [] Spasticity    YOU HAVE RECEIVED THE FOLLOWING MEDICATIONS IN YOUR INJECTION(s)  [] Lidocaine [] Bupivacaine   [] DepoMedrol (steroid) [] Decadron (steroid)  []  Kenalog (steroid)   [] Toradol  [] Supartz [] Zilretta    [x] Botox        PATIENT INFORMATION:   You may experience the following symptoms after your procedure. These symptoms are normal and should not cause concern:    You may have an increase in your pain. This may last 24 - 48 hours after your procedure.  You may have no change in the pain that you had prior to your injection(s).  You may have weakness or numbness in your affected extremity. If this occurs, this may last until numbing the medication wears off.     REPORT THE FOLLOWING SYMPTOMS TO YOUR DOCTOR:  Redness, swelling or drainage at the injection site(s)  Unusual pain that interferes with your normal activities of daily living.    OTHER INSTRUCTIONS:    [x] I will apply ice to the injection site(s) for at least 24 hours after the procedure. I will rotate the ice on for 20 minutes and off for 20 minutes for at least 24 hours.    [x] I will not apply heat for at least 48 hours and I will not take a hot bath or shower for at least 24 hours.     [] I understand that if Lidocaine or Bupivacaine was used in my injection(s) that the injection site(s)

## 2025-07-29 ENCOUNTER — TELEPHONE (OUTPATIENT)
Dept: PAIN MANAGEMENT | Age: 50
End: 2025-07-29

## 2025-08-07 ENCOUNTER — HOSPITAL ENCOUNTER (OUTPATIENT)
Dept: PAIN MANAGEMENT | Age: 50
Discharge: HOME | End: 2025-08-07
Payer: MEDICARE

## 2025-08-07 VITALS
OXYGEN SATURATION: 98 % | HEART RATE: 91 BPM | RESPIRATION RATE: 18 BRPM | SYSTOLIC BLOOD PRESSURE: 148 MMHG | DIASTOLIC BLOOD PRESSURE: 76 MMHG | TEMPERATURE: 97.8 F

## 2025-08-07 DIAGNOSIS — M62.830 SPASM OF MUSCLE OF LOWER BACK: ICD-10-CM

## 2025-08-07 DIAGNOSIS — M79.18 MYOFASCIAL PAIN: Chronic | ICD-10-CM

## 2025-08-07 DIAGNOSIS — M62.838 CERVICAL PARASPINAL MUSCLE SPASM: Primary | ICD-10-CM

## 2025-08-07 PROCEDURE — 20553 NJX 1/MLT TRIGGER POINTS 3/>: CPT

## 2025-08-07 PROCEDURE — 6360000002 HC RX W HCPCS

## 2025-08-07 RX ORDER — LIDOCAINE HYDROCHLORIDE 10 MG/ML
15 INJECTION, SOLUTION EPIDURAL; INFILTRATION; INTRACAUDAL; PERINEURAL ONCE
Status: DISCONTINUED | OUTPATIENT
Start: 2025-08-07 | End: 2025-08-09 | Stop reason: HOSPADM

## 2025-08-07 RX ORDER — BUPIVACAINE HYDROCHLORIDE 5 MG/ML
15 INJECTION, SOLUTION EPIDURAL; INTRACAUDAL; PERINEURAL ONCE
Status: DISCONTINUED | OUTPATIENT
Start: 2025-08-07 | End: 2025-08-09 | Stop reason: HOSPADM

## 2025-08-14 ENCOUNTER — TELEPHONE (OUTPATIENT)
Dept: PAIN MANAGEMENT | Age: 50
End: 2025-08-14

## 2025-08-28 ENCOUNTER — HOSPITAL ENCOUNTER (OUTPATIENT)
Dept: PAIN MANAGEMENT | Age: 50
Discharge: HOME | End: 2025-08-28
Payer: MEDICARE

## 2025-08-28 VITALS
TEMPERATURE: 96.3 F | OXYGEN SATURATION: 99 % | HEART RATE: 93 BPM | RESPIRATION RATE: 16 BRPM | SYSTOLIC BLOOD PRESSURE: 137 MMHG | DIASTOLIC BLOOD PRESSURE: 86 MMHG

## 2025-08-28 DIAGNOSIS — M17.0 PRIMARY OSTEOARTHRITIS OF BOTH KNEES: Primary | ICD-10-CM

## 2025-08-28 PROCEDURE — 6360000002 HC RX W HCPCS

## 2025-08-28 PROCEDURE — 20611 DRAIN/INJ JOINT/BURSA W/US: CPT

## 2025-08-28 RX ORDER — ETHYL CHLORIDE 100 %
AEROSOL, SPRAY (ML) TOPICAL PRN
Status: DISCONTINUED | OUTPATIENT
Start: 2025-08-28 | End: 2025-08-30 | Stop reason: HOSPADM

## 2025-08-28 RX ORDER — LIDOCAINE HYDROCHLORIDE 10 MG/ML
4 INJECTION, SOLUTION EPIDURAL; INFILTRATION; INTRACAUDAL; PERINEURAL ONCE
Status: DISCONTINUED | OUTPATIENT
Start: 2025-08-28 | End: 2025-08-30 | Stop reason: HOSPADM

## 2025-09-03 ENCOUNTER — TELEPHONE (OUTPATIENT)
Dept: PAIN MANAGEMENT | Age: 50
End: 2025-09-03

## 2025-09-03 DIAGNOSIS — M79.18 MYOFASCIAL PAIN: Chronic | ICD-10-CM

## 2025-09-03 DIAGNOSIS — M54.2 CHRONIC NECK PAIN: ICD-10-CM

## 2025-09-03 DIAGNOSIS — G43.019 INTRACTABLE MIGRAINE WITHOUT AURA AND WITHOUT STATUS MIGRAINOSUS: Chronic | ICD-10-CM

## 2025-09-03 DIAGNOSIS — F11.90 CHRONIC, CONTINUOUS USE OF OPIOIDS: ICD-10-CM

## 2025-09-03 DIAGNOSIS — M54.42 CHRONIC BILATERAL LOW BACK PAIN WITH BILATERAL SCIATICA: ICD-10-CM

## 2025-09-03 DIAGNOSIS — G89.4 CHRONIC PAIN SYNDROME: ICD-10-CM

## 2025-09-03 DIAGNOSIS — M25.562 CHRONIC PAIN OF BOTH KNEES: ICD-10-CM

## 2025-09-03 DIAGNOSIS — M54.41 CHRONIC BILATERAL LOW BACK PAIN WITH BILATERAL SCIATICA: ICD-10-CM

## 2025-09-03 DIAGNOSIS — G89.29 CHRONIC NECK PAIN: ICD-10-CM

## 2025-09-03 DIAGNOSIS — M25.561 CHRONIC PAIN OF BOTH KNEES: ICD-10-CM

## 2025-09-03 DIAGNOSIS — G89.29 CHRONIC BILATERAL LOW BACK PAIN WITH BILATERAL SCIATICA: ICD-10-CM

## 2025-09-03 DIAGNOSIS — G89.29 CHRONIC PAIN OF BOTH KNEES: ICD-10-CM

## 2025-09-03 RX ORDER — BUPRENORPHINE HYDROCHLORIDE 150 UG/1
1 FILM, SOLUBLE BUCCAL EVERY 12 HOURS
Qty: 60 EACH | Refills: 0 | Status: SHIPPED | OUTPATIENT
Start: 2025-09-03 | End: 2025-10-03

## 2025-09-03 RX ORDER — UBROGEPANT 100 MG/1
1 TABLET ORAL DAILY PRN
Qty: 16 TABLET | Refills: 2 | Status: SHIPPED | OUTPATIENT
Start: 2025-09-03 | End: 2025-12-02